# Patient Record
Sex: FEMALE | Race: WHITE | NOT HISPANIC OR LATINO | Employment: OTHER | URBAN - NONMETROPOLITAN AREA
[De-identification: names, ages, dates, MRNs, and addresses within clinical notes are randomized per-mention and may not be internally consistent; named-entity substitution may affect disease eponyms.]

---

## 2017-06-29 ENCOUNTER — OFFICE VISIT (OUTPATIENT)
Dept: ORTHOPEDIC SURGERY | Facility: CLINIC | Age: 62
End: 2017-06-29

## 2017-06-29 VITALS — HEIGHT: 62 IN | BODY MASS INDEX: 33.4 KG/M2 | WEIGHT: 181.5 LBS | RESPIRATION RATE: 16 BRPM

## 2017-06-29 DIAGNOSIS — S42.332A CLOSED DISPLACED OBLIQUE FRACTURE OF SHAFT OF LEFT HUMERUS, INITIAL ENCOUNTER: Primary | ICD-10-CM

## 2017-06-29 PROCEDURE — 99204 OFFICE O/P NEW MOD 45 MIN: CPT | Performed by: ORTHOPAEDIC SURGERY

## 2017-06-29 RX ORDER — ERGOCALCIFEROL 1.25 MG/1
50000 CAPSULE ORAL WEEKLY
Qty: 4 CAPSULE | Refills: 6 | Status: SHIPPED | OUTPATIENT
Start: 2017-06-29 | End: 2018-01-13 | Stop reason: SDUPTHER

## 2017-06-29 RX ORDER — FOLIC ACID 1 MG/1
1 TABLET ORAL DAILY
COMMUNITY

## 2017-06-29 RX ORDER — PHENOBARBITAL 15 MG/1
15 TABLET ORAL 2 TIMES DAILY
COMMUNITY
End: 2020-12-27

## 2017-06-29 RX ORDER — MELATONIN
1000 DAILY
COMMUNITY

## 2017-06-29 RX ORDER — PREGABALIN 100 MG/1
100 CAPSULE ORAL 2 TIMES DAILY
COMMUNITY
End: 2020-02-10 | Stop reason: SDUPTHER

## 2017-06-29 RX ORDER — DOCUSATE SODIUM 100 MG/1
100 CAPSULE, LIQUID FILLED ORAL 2 TIMES DAILY
COMMUNITY

## 2017-06-29 RX ORDER — ALENDRONATE SODIUM 10 MG/1
10 TABLET ORAL
COMMUNITY

## 2017-06-29 NOTE — PROGRESS NOTES
Subjective   Patient ID: Viviane Peñaloza is a 61 y.o. female  Pain of the Left Shoulder (Patient sustained injury approximately 5 months ago; slipped on rug while playing with nephew causing patient to fall. Patient is right hand dominant.  )             History of Present Illness  Patient fell approximate 5 months ago landing on her left shoulder sustaining a proximal humeral fracture was seen by Dr. 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  The family requested a second opinion and she's here today to be seen for proximity to her home in [place].  She complains of pain in the arm with use and laying on the side at night denies rest pain.  Pain is actually improved last several months.  Denies elbow or wrist pain.  No numbness or tingling.  Has intact neurologic function, denies neck pain.  Has history of old clavicle fracture many years ago on the left side which healed without complications.  Patient's medical history is positive for seizure disorder managed with medications has been taking calcium and vitamin D supplements for osteoporosis and denies other acute medical illness.      Review of Systems   Constitutional: Negative for diaphoresis, fever and unexpected weight change.   HENT: Negative for dental problem and sore throat.    Eyes: Negative for visual disturbance.   Respiratory: Negative for shortness of breath.    Cardiovascular: Negative for chest pain.   Gastrointestinal: Negative for abdominal pain, constipation, diarrhea, nausea and vomiting.   Genitourinary: Negative for difficulty urinating and frequency.   Musculoskeletal: Positive for arthralgias.   Neurological: Negative for headaches.   Hematological: Does not bruise/bleed easily.   All other systems reviewed and are negative.      History reviewed. No pertinent  "past medical history.     History reviewed. No pertinent surgical history.    History reviewed. No pertinent family history.    Social History     Social History   • Marital status: Single     Spouse name: N/A   • Number of children: N/A   • Years of education: N/A     Occupational History   • Not on file.     Social History Main Topics   • Smoking status: Not on file   • Smokeless tobacco: Not on file   • Alcohol use Not on file   • Drug use: Not on file   • Sexual activity: Not on file     Other Topics Concern   • Not on file     Social History Narrative   • No narrative on file       No Known Allergies    Objective   Resp 16  Ht 62\" (157.5 cm)  Wt 181 lb 8 oz (82.3 kg)  BMI 33.2 kg/m2   Physical Exam  Constitutional: He is oriented to person, place, and time. He appears well-developed and well-nourished.   HENT:   Head: Normocephalic and atraumatic.   Eyes: EOM are normal. Pupils are equal, round, and reactive to light.   Neck: Normal range of motion. Neck supple.   Cardiovascular: Normal rate.    Pulmonary/Chest: Effort normal and breath sounds normal.   Abdominal: Soft.   Neurological: He is alert and oriented to person, place, and time.   Skin: Skin is warm and dry.   Psychiatric: He has a normal mood and affect.   Nursing note and vitals reviewed.       Ortho Exam   Left shoulder with obvious motion at the fracture site with pain, minimal pain at the acromioclavicular joint, minimal atrophy of the supraspinatus area, intact radial median and ulnar nerve function to the left arm, forearm soft, elbow wrist and hand with no focal areas of tenderness swelling or ecchymosis, good circulation to the forearm and hand with negative Apolinar testing.    Assessment/Plan   Review of Radiographic Studies:    No new imaging done today.      Procedures        Use brace as instructed      Recommendations/Plan:   Brace: Patient referred for custom humeral fracture brace    Patient agreeable to call or return sooner for any " concerns.       Discussed with patient and her sister the diagnosis condition natural history and treatment alternatives.  At this point I would agree with decision to continue with nonsurgical treatment provided external support in the form of a orthotic, supplemental vitamin D levels, continue calcium supplements, recheck x-rays in several months.  If failure to heal occurs at that time patient should be referred to [facility] for definitive  treatment.  Patient and family are in agreement with the above plan.      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

## 2017-08-25 DIAGNOSIS — S42.332A CLOSED DISPLACED OBLIQUE FRACTURE OF SHAFT OF LEFT HUMERUS, INITIAL ENCOUNTER: Primary | ICD-10-CM

## 2017-08-30 ENCOUNTER — OFFICE VISIT (OUTPATIENT)
Dept: ORTHOPEDIC SURGERY | Facility: CLINIC | Age: 62
End: 2017-08-30

## 2017-08-30 VITALS — BODY MASS INDEX: 34.28 KG/M2 | RESPIRATION RATE: 16 BRPM | WEIGHT: 186.3 LBS | HEIGHT: 62 IN

## 2017-08-30 DIAGNOSIS — S42.332K CLOSED DISPLACED OBLIQUE FRACTURE OF SHAFT OF LEFT HUMERUS WITH NONUNION, SUBSEQUENT ENCOUNTER: Primary | ICD-10-CM

## 2017-08-30 PROCEDURE — 99214 OFFICE O/P EST MOD 30 MIN: CPT | Performed by: ORTHOPAEDIC SURGERY

## 2017-08-30 RX ORDER — LEVETIRACETAM 750 MG/1
TABLET ORAL
COMMUNITY
Start: 2017-08-26

## 2017-08-30 RX ORDER — PREDNISONE 10 MG/1
TABLET ORAL
COMMUNITY
Start: 2017-06-23 | End: 2020-12-27

## 2017-08-30 RX ORDER — PHENOBARBITAL 64.8 MG/1
TABLET ORAL
COMMUNITY
Start: 2017-08-28 | End: 2020-02-10 | Stop reason: SDUPTHER

## 2017-08-30 RX ORDER — ALENDRONATE SODIUM 70 MG/1
TABLET ORAL
COMMUNITY
Start: 2017-08-26

## 2017-08-30 RX ORDER — NYSTATIN 100000 U/G
OINTMENT TOPICAL
COMMUNITY
Start: 2017-06-22

## 2017-08-30 NOTE — PROGRESS NOTES
Subjective   Patient ID: Viviane Peñaloza is a 62 y.o. female  Follow-up of the Left Shoulder             History of Present Illness    Date of original injury occurred January 2017, treated elsewhere with sling, I saw her for first evaluation several months ago at  which time made recommendation for fracture bracing-- she returns now for routine follow-up.  She is right-hand-dominant.  Patient still complains of left arm pain with use still slightly improve wearing her coaptation brace but is bothering the axillary area of her contralateral right shoulder.  No new complaints of swelling in the arm associated wrist or hand pain.  She continues to take vitamin D and calcium supplements.    Review of Systems   Constitutional: Negative for diaphoresis, fever and unexpected weight change.   HENT: Negative for dental problem and sore throat.    Eyes: Negative for visual disturbance.   Respiratory: Negative for shortness of breath.    Cardiovascular: Negative for chest pain.   Gastrointestinal: Negative for abdominal pain, constipation, diarrhea, nausea and vomiting.   Genitourinary: Negative for difficulty urinating and frequency.   Musculoskeletal: Positive for arthralgias.   Neurological: Negative for headaches.   Hematological: Does not bruise/bleed easily.   All other systems reviewed and are negative.      Past Medical History:   Diagnosis Date   • Epilepsy    • Fracture, clavicle     left    • Shoulder fracture, left         Past Surgical History:   Procedure Laterality Date   • OOPHORECTOMY         Family History   Problem Relation Age of Onset   • Diabetes Mother    • Heart disease Mother    • Hypertension Mother    • Hypertension Father    • Diabetes Sister    • Hypertension Sister        Social History     Social History   • Marital status: Single     Spouse name: N/A   • Number of children: N/A   • Years of education: N/A     Occupational History   • Not on file.     Social History Main Topics   • Smoking status:  "Never Smoker   • Smokeless tobacco: Never Used   • Alcohol use No   • Drug use: No   • Sexual activity: Defer     Other Topics Concern   • Not on file     Social History Narrative       All the above social hx, family hx, surgical history, allergies, ros & HPI reviewed.    No Known Allergies    Objective   Resp 16  Ht 62\" (157.5 cm)  Wt 186 lb 4.8 oz (84.5 kg)  BMI 34.07 kg/m2   Physical Exam  Constitutional: He is oriented to person, place, and time. He appears well-developed and well-nourished.   HENT:   Head: Normocephalic and atraumatic.   Eyes: EOM are normal. Pupils are equal, round, and reactive to light.   Neck: Normal range of motion. Neck supple.   Cardiovascular: Normal rate.    Pulmonary/Chest: Effort normal and breath sounds normal.   Abdominal: Soft.   Neurological: He is alert and oriented to person, place, and time.   Skin: Skin is warm and dry.   Psychiatric: He has a normal mood and affect.   Nursing note and vitals reviewed.       Ortho Exam   Left shoulder with pain at the fracture site with gross motion evident with forward flexion and extension and abduction, elbow with no focal tenderness swelling, radial nerve function grossly intact to the hand, no tenderness swelling or ecchymosis in the wrist or hand.    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

## 2018-01-15 RX ORDER — ERGOCALCIFEROL 1.25 MG/1
CAPSULE ORAL
Qty: 4 CAPSULE | Refills: 2 | Status: SHIPPED | OUTPATIENT
Start: 2018-01-15

## 2018-02-08 ENCOUNTER — OUTSIDE FACILITY SERVICE (OUTPATIENT)
Dept: INTERNAL MEDICINE | Facility: CLINIC | Age: 63
End: 2018-02-08

## 2018-02-08 PROCEDURE — 99306 1ST NF CARE HIGH MDM 50: CPT | Performed by: FAMILY MEDICINE

## 2018-02-13 ENCOUNTER — OUTSIDE FACILITY SERVICE (OUTPATIENT)
Dept: INTERNAL MEDICINE | Facility: CLINIC | Age: 63
End: 2018-02-13

## 2018-02-13 PROCEDURE — 99309 SBSQ NF CARE MODERATE MDM 30: CPT | Performed by: FAMILY MEDICINE

## 2018-02-14 ENCOUNTER — OUTSIDE FACILITY SERVICE (OUTPATIENT)
Dept: FAMILY MEDICINE CLINIC | Facility: CLINIC | Age: 63
End: 2018-02-14

## 2018-02-14 PROCEDURE — 99309 SBSQ NF CARE MODERATE MDM 30: CPT | Performed by: NURSE PRACTITIONER

## 2018-02-23 ENCOUNTER — OUTSIDE FACILITY SERVICE (OUTPATIENT)
Dept: FAMILY MEDICINE CLINIC | Facility: CLINIC | Age: 63
End: 2018-02-23

## 2018-02-23 PROCEDURE — 99309 SBSQ NF CARE MODERATE MDM 30: CPT | Performed by: NURSE PRACTITIONER

## 2018-02-27 ENCOUNTER — OUTSIDE FACILITY SERVICE (OUTPATIENT)
Dept: INTERNAL MEDICINE | Facility: CLINIC | Age: 63
End: 2018-02-27

## 2018-02-27 PROCEDURE — 99309 SBSQ NF CARE MODERATE MDM 30: CPT | Performed by: FAMILY MEDICINE

## 2018-03-01 ENCOUNTER — DOCUMENTATION (OUTPATIENT)
Dept: INTERNAL MEDICINE | Facility: CLINIC | Age: 63
End: 2018-03-01

## 2018-03-01 NOTE — PROGRESS NOTES
[facility]      Patient Name: Viviane Peñaloza    : 1955     DOS: 2018    Nursing Facility: []   [place]  []   [facility]  []   [facility]  [x]   [facility]    Subjective     Chief Complaint:  CMM/LTC    History of Present Illness:   [x]  Routine visit for coordination of short term rehab/care issues and chronic medical management needs.  L medial tibia fracture, no new injuries.    Review of Systems:  []  A complete ROS was performed. All were (-).  [x]  A complete ROS was performed. All were (-) except:      + - Comments   Constitutional []    []      Eyes  []   []      ENT & Mouth []   []      Cardiovascular  []   []      Respiratory []   []      Gastrointestinal  []   []      Genitourinary []   []      Musculoskeletal [x]   []   RLE pain, mild edema at times   Skin/Breast  []   []      Neurological []   []      Endocrine []   []      Hematological/Lymph  []   []      Allergy/Immuno []   []        Past Medical History:   Past Medical History:   Diagnosis Date   • Epilepsy    • Fracture, clavicle     left    • Shoulder fracture, left     Reviewed at facility    Past Surgical History:  Past Surgical History:   Procedure Laterality Date   • OOPHORECTOMY      Reviewed at facility    Family History: family history includes Diabetes in her mother and sister; Heart disease in her mother; Hypertension in her father, mother, and sister. Reviewed at facility    Social History:  reports that she has never smoked. She has never used smokeless tobacco. She reports that she does not drink alcohol or use illicit drugs. Reviewed at facility    Allergies:  No Known Allergies Reviewed at facility, no new listings     [x]  History reviewed at skilled nursing facility.    Objective     Vital Signs: BP: __110/60___  HR: __82__  Res: __18__  O2: __96__  LPM: __RA__  Weight: __178___    Physical Exam:  General:   [x]  Alert   [x]  Oriented  []  Confused  []  Disoriented        []  Comatose     Head:   [x] NCAT   [x]  Nasal/Ears  [x]  Throat      Eyes:        [x]  PEERLA  [x]  Conjunctive NL     Psych:  [x]  Mood ___no acute changes___________  []  Depressed     Heart::   [x]  RRR  []  IRRR  []  Murmur ___________     Pedal Pulses:    [x]  Present  []  Absent      Lungs:   [x]   Clear  []  Rales, Location ___________       Abdomen:    [x]   Soft, BS x 4, NT/ND  []  Distended  []  Tender __________       Skin:  [x]  Intact  []  Pressure Ulcer, Location _______________     Extremities:  [x]  Normal  [x]  Edema, Location gravity dep to LLE  []  Pitting     Pain:  []  None  [x]  Pain noted w/pain management in place       Urinary:                [x]  Continent  []  Incontinent  []  Retention  []  F/C     []  UTI w/treatment in progress                Appetite:  []  Fair  [x]  Good  []  Poor  [x]  Weight Stable     []  Weightloss  []  Unavoidable Weight Loss     []  Supplements Provided  []  Tolerating Tube Feeding    Assessment / Plan     Assessment/Plan:     [x]  Medications Reviewed at Skilled Nursing Facility  [x]   PT/OT Reviewed       []  Order Changes  [x]   Labs Reviewed  []   Discharge Plans Reviewed    [x]  Plan of Care Reviewed  []  Discharge Summary Reviewed      [x]  Discussed Patient in detail with nursing/staff, addressed all needs today.  1) Epilepsy- No reports of sz like activity.   2) Fracture Left Medial Tibia- cont routine ortho after-care as per recommendation of orthopedic surgery; cont PT/OT; pain appears well controlled at present.  3) Impaired Mobility and ADLs- cont supportive care as needed for any ADLs.  Good [place]/wt stable.    Luigi Urena DO 02/08/2018     EMR Dragon/Transcription disclaimer:   Much of this encounter note is an electronic transcription/translation of spoken language to printed text. The electronic translation of spoken language may permit erroneous, or at times, nonsensical words or phrases to be inadvertently transcribed; Although I have reviewed the note for such errors, some  may still exist.

## 2018-03-07 ENCOUNTER — OUTSIDE FACILITY SERVICE (OUTPATIENT)
Dept: INTERNAL MEDICINE | Facility: CLINIC | Age: 63
End: 2018-03-07

## 2018-03-07 PROCEDURE — 99309 SBSQ NF CARE MODERATE MDM 30: CPT | Performed by: FAMILY MEDICINE

## 2018-03-23 ENCOUNTER — OUTSIDE FACILITY SERVICE (OUTPATIENT)
Dept: FAMILY MEDICINE CLINIC | Facility: CLINIC | Age: 63
End: 2018-03-23

## 2018-03-23 PROCEDURE — 99308 SBSQ NF CARE LOW MDM 20: CPT | Performed by: NURSE PRACTITIONER

## 2018-03-28 ENCOUNTER — OUTSIDE FACILITY SERVICE (OUTPATIENT)
Dept: INTERNAL MEDICINE | Facility: CLINIC | Age: 63
End: 2018-03-28

## 2018-03-28 PROCEDURE — 99309 SBSQ NF CARE MODERATE MDM 30: CPT | Performed by: FAMILY MEDICINE

## 2018-04-11 ENCOUNTER — OUTSIDE FACILITY SERVICE (OUTPATIENT)
Dept: INTERNAL MEDICINE | Facility: CLINIC | Age: 63
End: 2018-04-11

## 2018-04-11 PROCEDURE — 99309 SBSQ NF CARE MODERATE MDM 30: CPT | Performed by: FAMILY MEDICINE

## 2018-04-24 ENCOUNTER — OUTSIDE FACILITY SERVICE (OUTPATIENT)
Dept: INTERNAL MEDICINE | Facility: CLINIC | Age: 63
End: 2018-04-24

## 2018-04-24 PROCEDURE — 99309 SBSQ NF CARE MODERATE MDM 30: CPT | Performed by: FAMILY MEDICINE

## 2018-05-04 ENCOUNTER — OUTSIDE FACILITY SERVICE (OUTPATIENT)
Dept: FAMILY MEDICINE CLINIC | Facility: CLINIC | Age: 63
End: 2018-05-04

## 2018-05-04 PROCEDURE — 99308 SBSQ NF CARE LOW MDM 20: CPT | Performed by: NURSE PRACTITIONER

## 2018-06-29 ENCOUNTER — OUTSIDE FACILITY SERVICE (OUTPATIENT)
Dept: FAMILY MEDICINE CLINIC | Facility: CLINIC | Age: 63
End: 2018-06-29

## 2018-06-29 PROCEDURE — 99309 SBSQ NF CARE MODERATE MDM 30: CPT | Performed by: NURSE PRACTITIONER

## 2018-07-09 ENCOUNTER — NURSING HOME (OUTPATIENT)
Dept: FAMILY MEDICINE CLINIC | Facility: CLINIC | Age: 63
End: 2018-07-09

## 2018-07-09 VITALS
WEIGHT: 198 LBS | RESPIRATION RATE: 18 BRPM | SYSTOLIC BLOOD PRESSURE: 130 MMHG | BODY MASS INDEX: 36.21 KG/M2 | DIASTOLIC BLOOD PRESSURE: 70 MMHG | HEART RATE: 72 BPM | OXYGEN SATURATION: 97 % | TEMPERATURE: 98 F

## 2018-07-09 DIAGNOSIS — G40.409 OTHER GENERALIZED EPILEPSY, NOT INTRACTABLE, WITHOUT STATUS EPILEPTICUS (HCC): Chronic | ICD-10-CM

## 2018-07-09 DIAGNOSIS — Z78.9 IMPAIRED MOBILITY AND ADLS: Chronic | ICD-10-CM

## 2018-07-09 DIAGNOSIS — Z74.09 IMPAIRED MOBILITY AND ADLS: Chronic | ICD-10-CM

## 2018-07-09 DIAGNOSIS — S82.132D: Primary | ICD-10-CM

## 2018-07-09 PROBLEM — G40.909 EPILEPSY (HCC): Chronic | Status: ACTIVE | Noted: 2018-07-09

## 2018-07-09 PROCEDURE — 99308 SBSQ NF CARE LOW MDM 20: CPT | Performed by: FAMILY MEDICINE

## 2018-07-09 NOTE — PROGRESS NOTES
[facility]  Skilled Nursing Facility    Patient Name: Viviane Peñaloza    : 1955     DOS: 2018    Nursing Facility: []   [place]  []   [facility]  []   [facility]  [x]   [facility]    Subjective     Chief Complaint:  CMM/LTC    History of Present Illness:   [x]  Follow Up visit for coordination of long term care issues and chronic medical management needs.    Left medial tibial fx; no sz like activity; imp mob and ADLs      Review of Systems:  [x]  A complete ROS was performed. All were (-).  []  A complete ROS was performed. All were (-) except:     Review of Systems    1. Constitutional:  2. HENT:  3. Eyes:  4. Respiratory:  5. Cardiovascular:  6. Gastrointestinal:  7. Endocrine:  8. Genitourinary:  9. Musculoskeletal:  10. Skin:  11. Neurological:  12. Hematological:  13. Psychiatric/Behavioral:    Past Medical History:   Past Medical History:   Diagnosis Date   • Epilepsy (CMS/HCC)    • Fracture, clavicle     left    • Shoulder fracture, left     Reviewed at facility    Past Surgical History:  Past Surgical History:   Procedure Laterality Date   • OOPHORECTOMY      Reviewed at facility    Family History: family history includes Diabetes in her mother and sister; Heart disease in her mother; Hypertension in her father, mother, and sister. Reviewed at facility    Social History:  reports that she has never smoked. She has never used smokeless tobacco. She reports that she does not drink alcohol or use drugs. Reviewed at facility    Allergies:  No Known Allergies Reviewed at facility, no new listings     [x]  History reviewed at skilled nursing facility.    Objective     Vital Signs: Temp:  [98 °F (36.7 °C)] 98 °F (36.7 °C)  Heart Rate:  [72] 72  Resp:  [18] 18  BP: (130)/(70) 130/70                                            LPM: ____  Weight: _____    Physical Exam:  General:   [x]  Alert   [x]  Oriented x 3  [x]  No acute distress    []  Confused  []  Disoriented   []  Comatose      HEENT:    [x] Atraumatic  [x]  PEERLA  [x]  Oral mucosa pink and moist                Neck:      [x]  Nares patent without epistaxis  [x]  External auditory canals are patent     [x]  Tympanic membranes intact      [x]  Supple [x]  No JVD [x]  Trachea midline [x]  No thyromegaly      Psych:  [x]  Mood _________  []  No acute changes []  Depressed     Heart::   [x]  RRR  []  IRRR  []  Murmur ___________     Pedal Pulses:    [x]  Present  []  Absent      Lungs:   [x]   CTA  [x]  Unlabored breathing effort []  Rales, Location ___________       Abdomen:    [x]   Soft, BS x 4, NT/ND  []  Distended  []  Tender __________       Skin:  [x]  Intact, warm and dry  []  Pressure Ulcer, Location _______________     Extremities:               Neuro:     [x]  No clubbing [x]  No cyanosis  [x]  No edema    [x]  Good ROM actively and passively [x] Symmetric muscle strength and                                                                 development     []  Edema, Location _______________  []  Pitting    [x]  Normal speech [x]  Normal mental status [x] Cranial nerves II through                                                                                XII intact   [x]  No anosmia [x]  DTR 2+ [x]  Proprioception intact    [x]  No focal motor/sensory deficits       Pain:  []  None  [x]  Pain noted w/pain management in place       Urinary:                []  Continent  []  Incontinent  []  Retention  []  F/C     []  UTI w/treatment in progress              Appetite:  []  Fair  [x]  Good  []  Poor  [x]  Weight Stable     []  Weightloss  []  Unavoidable Weight Loss     []  Supplements Provided  []  Tolerating Tube Feeding              Assessment / Plan     Assessment/Problem List:    Patient Active Problem List   Diagnosis   • Epilepsy (CMS/HCC)   • Impaired mobility and ADLs   • Closed displaced fracture of medial condyle of left tibia with routine healing       Plan:  Cont SC as needed for mobilization and wt bearing; no reports of sz  like activity; cont to be followed outpt by neurology; good appetite; pain well controlled.      [x] Medication Review: I have reviewed the patients active and prn medications at Skilled Nursing Facility      []   PT/OT Reviewed   []  Order Changes     [x] I have personally reviewed and interpreted available lab data, radiology studies and ECG obtained at time of visit.       []   Discharge Plans Reviewed      [x]  Plan of Care Reviewed  []  Discharge Summary Reviewed      [x]  Discussed Patient in detail with nursing/staff, addressed all needs today.    Discussed plan of care in detail with pt today; pt verb understanding and agrees; counseled for approx 15 min of total 25 min total exam time.    Luigi Urena DO 07/09/18 7:50 PM    EMR Dragon/Transcription disclaimer:   Much of this encounter note is an electronic transcription/translation of spoken language to printed text. The electronic translation of spoken language may permit erroneous, or at times, nonsensical words or phrases to be inadvertently transcribed; Although I have reviewed the note for such errors, some may still exist.

## 2018-07-17 ENCOUNTER — NURSING HOME (OUTPATIENT)
Dept: FAMILY MEDICINE CLINIC | Facility: CLINIC | Age: 63
End: 2018-07-17

## 2018-07-17 DIAGNOSIS — Z74.09 IMPAIRED MOBILITY AND ADLS: Chronic | ICD-10-CM

## 2018-07-17 DIAGNOSIS — Z78.9 IMPAIRED MOBILITY AND ADLS: Chronic | ICD-10-CM

## 2018-07-17 DIAGNOSIS — G40.309 NONINTRACTABLE GENERALIZED IDIOPATHIC EPILEPSY WITHOUT STATUS EPILEPTICUS (HCC): Primary | Chronic | ICD-10-CM

## 2018-07-17 DIAGNOSIS — S82.132D: ICD-10-CM

## 2018-07-17 PROCEDURE — 99309 SBSQ NF CARE MODERATE MDM 30: CPT | Performed by: FAMILY MEDICINE

## 2018-07-19 PROBLEM — G40.309 NONINTRACTABLE GENERALIZED IDIOPATHIC EPILEPSY WITHOUT STATUS EPILEPTICUS: Chronic | Status: ACTIVE | Noted: 2018-07-19

## 2018-07-19 PROBLEM — G40.909 EPILEPSY: Chronic | Status: RESOLVED | Noted: 2018-07-09 | Resolved: 2018-07-19

## 2018-07-19 NOTE — PROGRESS NOTES
Nursing Home Progress Note      Patient Name: Viviane Peñaloza   YOB: 1955    Date of Service: 07/17/2018      Facility: [facility] []   [place] []   [facility] [x]   [facility] []   Other []       CHIEF COMPLAINT:  CMM/LTC     HISTORY OF PRESENT ILLNESS:  [x]  Follow Up visit for coordination of chronic medical management needs and issues.    Epilepsy/impaired mobility and ADLs/post displaced fracture of the medial condyle of the left tibia with routine healing    PAST MEDICAL & SURGICAL HISTORY:  Past Medical History:   Diagnosis Date   • Epilepsy (CMS/HCC)    • Fracture, clavicle     left    • Shoulder fracture, left       Past Surgical History:   Procedure Laterality Date   • OOPHORECTOMY          MEDICATIONS:  Medication administration record for Viviane Peñaloza reviewed and reconciled today.    ALLERGIES:  No Known Allergies     REVIEW OF SYSTEMS:  • Appetite: Fair []   Good [x]   Poor []   Weight Loss []  [x]  Weight Stable   Unavoidable Weight Loss []  Tolerating Tube Feeding []    Supplements Provided []   • Constitutional: Negative for fever, chills, diaphoresis or fatigue and weight change.   • HENT: No dysphagia; no changes to vision/hearing/smell/taste; no epistaxis  • Eyes: Negative for redness and visual disturbance.   • Respiratory: Negative for shortness of breath, chest pain, cough or chest tightness.   • Cardiovascular: Negative for chest pain and palpitations.   • Gastrointestinal: Negative for abdominal distention, abdominal pain and blood in stool.   • Endocrine: Negative for cold intolerance and heat intolerance.   • Genitourinary: Negative for difficulty urinating, dysuria and frequency.Negative for hematuria   • Musculoskeletal: Chronic myalgias and arthralgias  • Integumentary: No open wounds, rash or concerning skin lesions  • Neurological: Negative for syncope, weakness and headaches.  No seizure-like activity reported.  • Hematological: Negative for adenopathy. Does not bruise/bleed  easily.   • Immunological: Negative for reported allergies or immunological disorders  • Psychological: No depression, anxiety or suicidal ideation    PHYSICAL EXAMINATION:  VITAL SIGNS: /67, HR 68 bpm, RR 16, O2 sat 98%, weight 198    General Appearance:  [x]  Alert   [x]  Oriented x 3  [x]  No acute distress    []  Confused  []  Disoriented   []  Comatose   Head:  Atraumatic and normocephalic, without obvious abnormality.   Eyes:          PERRLA, conjunctivae and sclerae normal, no Icterus. No pallor. Extra-occular movements are within normal limits.   Ears:  Ears appear intact with no abnormalities noted.   Throat: No oral lesions, no thrush, oral mucosa moist.   Neck: Supple, trachea midline, no thyromegaly, no carotid bruit.   Back:   No kyphoscoliosis. No tenderness to palpation.   Lungs:   Chest shape is normal.  Audible air exchange noted all lung fields.  No wheezing.    Heart:  Normal S1 and S2, no murmur, no gallop, no rub. No JVD.   Abdomen:   Normal bowel sounds, no masses, no organomegaly. Soft, non-tender, non-distended, no guarding    Extremities: Moves all extremities, edema, cyanosis or clubbing.  Generalized weakness noted to the left lower extremity.      Pulses: Pulses palpable and equal bilaterally.   Skin: No bleeding or rash.  Generalized dry skin noted.  Age-related atrophy of skin.   Neurologic: [x] Normal speech []  Normal mental status    [x] Cranial nerves II through XII intact   [x]  No anosmia [x]  DTR 2+ [x]  Proprioception intact  [x]  No focal motor/sensory deficits     Psych/Mood:        [x]  No acute changes []  Depressed  Urinary:        [x]  Continent  []  Incontinent  []  Retention  []  F/C     []  UTI w/treatment in progress  RECORD REVIEW:   • Consult notes []   • Admission and subsequent notes []   •  [x] I have personally reviewed and interpreted available lab data, radiology studies and ECG obtained at time of visit.  [x] Medication Review: I have reviewed the  patients active and prn medications at Skilled Nursing Facility     ASSESSMENT   Diagnoses and all orders for this visit:    Nonintractable generalized idiopathic epilepsy without status epilepticus (CMS/HCC)    Closed displaced fracture of medial condyle of left tibia with routine healing    Impaired mobility and ADLs    •     PLAN  Continue supportive care as needed for mobilization and any assistance with ADLs.  No reported seizure-like activity.  Continue current medication regimen.  Routine surveillance labs as needed.  Continue therapy as able.    [x]  Discussed Patient in detail with nursing/staff, addressed all needs today.     [x]  Plan of Care Reviewed   []   PT/OT Reviewed   []  Order Changes  []   Discharge Plans Reviewed         Total face to face time spent with patient 25 minutes, 15 min in counseling.    Luigi Urena DO.  7/19/2018

## 2018-07-23 ENCOUNTER — NURSING HOME (OUTPATIENT)
Dept: FAMILY MEDICINE CLINIC | Facility: CLINIC | Age: 63
End: 2018-07-23

## 2018-07-23 DIAGNOSIS — Z74.09 IMPAIRED MOBILITY AND ADLS: Primary | Chronic | ICD-10-CM

## 2018-07-23 DIAGNOSIS — Z78.9 IMPAIRED MOBILITY AND ADLS: Primary | Chronic | ICD-10-CM

## 2018-07-23 DIAGNOSIS — S82.132D: ICD-10-CM

## 2018-07-23 DIAGNOSIS — G40.309 NONINTRACTABLE GENERALIZED IDIOPATHIC EPILEPSY WITHOUT STATUS EPILEPTICUS (HCC): Chronic | ICD-10-CM

## 2018-07-23 PROCEDURE — 99309 SBSQ NF CARE MODERATE MDM 30: CPT | Performed by: FAMILY MEDICINE

## 2018-07-31 ENCOUNTER — NURSING HOME (OUTPATIENT)
Dept: FAMILY MEDICINE CLINIC | Facility: CLINIC | Age: 63
End: 2018-07-31

## 2018-07-31 DIAGNOSIS — S82.132D: ICD-10-CM

## 2018-07-31 DIAGNOSIS — Z74.09 IMPAIRED MOBILITY AND ADLS: Chronic | ICD-10-CM

## 2018-07-31 DIAGNOSIS — Z78.9 IMPAIRED MOBILITY AND ADLS: Chronic | ICD-10-CM

## 2018-07-31 DIAGNOSIS — G40.309 NONINTRACTABLE GENERALIZED IDIOPATHIC EPILEPSY WITHOUT STATUS EPILEPTICUS (HCC): Primary | Chronic | ICD-10-CM

## 2018-07-31 PROCEDURE — 99309 SBSQ NF CARE MODERATE MDM 30: CPT | Performed by: FAMILY MEDICINE

## 2018-08-08 ENCOUNTER — NURSING HOME (OUTPATIENT)
Dept: FAMILY MEDICINE CLINIC | Facility: CLINIC | Age: 63
End: 2018-08-08

## 2018-08-08 DIAGNOSIS — G40.309 NONINTRACTABLE GENERALIZED IDIOPATHIC EPILEPSY WITHOUT STATUS EPILEPTICUS (HCC): Primary | Chronic | ICD-10-CM

## 2018-08-08 DIAGNOSIS — Z74.09 IMPAIRED MOBILITY AND ADLS: Chronic | ICD-10-CM

## 2018-08-08 DIAGNOSIS — S82.132D: ICD-10-CM

## 2018-08-08 DIAGNOSIS — Z78.9 IMPAIRED MOBILITY AND ADLS: Chronic | ICD-10-CM

## 2018-08-08 PROCEDURE — 99309 SBSQ NF CARE MODERATE MDM 30: CPT | Performed by: FAMILY MEDICINE

## 2018-08-14 NOTE — PROGRESS NOTES
Nursing Home Progress Note      Patient Name: Viviane Peñaloza   YOB: 1955    Date of Service: 07/23/2018      Facility: [place] []   [place] []   [facility] [x]   [facility] []   Other []       CHIEF COMPLAINT:  CMM/LTC     HISTORY OF PRESENT ILLNESS:  [x]  Follow Up visit for coordination of chronic medical management needs and issues.    Epilepsy/impaired mobility and ADLs/post displaced fracture of the medial condyle of the left tibia with routine healing    PAST MEDICAL & SURGICAL HISTORY:  Past Medical History:   Diagnosis Date   • Epilepsy (CMS/HCC)    • Fracture, clavicle     left    • Shoulder fracture, left       Past Surgical History:   Procedure Laterality Date   • OOPHORECTOMY          MEDICATIONS:  Medication administration record for Viviane Peñaloza reviewed and reconciled today.    ALLERGIES:  No Known Allergies     REVIEW OF SYSTEMS:  • Appetite: Fair []   Good [x]   Poor []   Weight Loss []  [x]  Weight Stable   Unavoidable Weight Loss []  Tolerating Tube Feeding []    Supplements Provided []   • Constitutional: Negative for fever, chills, diaphoresis or fatigue and weight change.   • HENT: No dysphagia; no changes to vision/hearing/smell/taste; no epistaxis  • Eyes: Negative for redness and visual disturbance.   • Respiratory: Negative for shortness of breath, chest pain, cough or chest tightness.   • Cardiovascular: Negative for chest pain and palpitations.   • Gastrointestinal: Negative for abdominal distention, abdominal pain and blood in stool.   • Endocrine: Negative for cold intolerance and heat intolerance.   • Genitourinary: Negative for difficulty urinating, dysuria and frequency.Negative for hematuria   • Musculoskeletal: Chronic myalgias and arthralgias  • Integumentary: No open wounds, rash or concerning skin lesions  • Neurological: Negative for syncope, weakness and headaches.  No seizure-like activity reported.  • Hematological: Negative for adenopathy. Does not bruise/bleed  easily.   • Immunological: Negative for reported allergies or immunological disorders  • Psychological: No depression, anxiety or suicidal ideation    PHYSICAL EXAMINATION:  VITAL SIGNS: /64, HR 59 BPM, O2 sat 98% on room air, RR 16, weight 198    General Appearance:  [x]  Alert   [x]  Oriented x 3  [x]  No acute distress    []  Confused  []  Disoriented   []  Comatose   Head:  Atraumatic and normocephalic, without obvious abnormality.   Eyes:          PERRLA, conjunctivae and sclerae normal, no Icterus. No pallor. Extra-occular movements are within normal limits.   Ears:  Ears appear intact with no abnormalities noted.   Throat: No oral lesions, no thrush, oral mucosa moist.   Neck: Supple, trachea midline, no thyromegaly, no carotid bruit.   Back:   No kyphoscoliosis. No tenderness to palpation.   Lungs:   Chest shape is normal.  Audible air exchange noted all lung fields.  No wheezing.    Heart:  Normal S1 and S2, no murmur, no gallop, no rub. No JVD.   Abdomen:   Normal bowel sounds, no masses, no organomegaly. Soft, non-tender, non-distended, no guarding    Extremities: Moves all extremities, edema, cyanosis or clubbing.  Generalized weakness noted to the left lower extremity.      Pulses: Pulses palpable and equal bilaterally.   Skin: No bleeding or rash.  Generalized dry skin noted.  Age-related atrophy of skin.   Neurologic: [x] Normal speech []  Normal mental status    [x] Cranial nerves II through XII intact   [x]  No anosmia [x]  DTR 2+ [x]  Proprioception intact  [x]  No focal motor/sensory deficits     Psych/Mood:        [x]  No acute changes []  Depressed  Urinary:        [x]  Continent  []  Incontinent  []  Retention  []  F/C     []  UTI w/treatment in progress  RECORD REVIEW:   • Consult notes []   • Admission and subsequent notes []   •  [x] I have personally reviewed and interpreted available lab data, radiology studies and ECG obtained at time of visit.  [x] Medication Review: I have  reviewed the patients active and prn medications at [facility]     ASSESSMENT   Diagnoses and all orders for this visit:    Impaired mobility and ADLs    Nonintractable generalized idiopathic epilepsy without status epilepticus (CMS/HCC)    Closed displaced fracture of medial condyle of left tibia with routine healing        PLAN  Continue supportive care as needed for mobilization and any assistance with ADLs.  No reported seizure-like activity/episodes.  Continue current medication regimen, adjustments to be made as needed.  Routine surveillance labs to be continued.  Continue therapy as able to tolerate.  Appetite is good, hemodynamically stable on review of vital signs.    [x]  Discussed Patient in detail with nursing/staff, addressed all needs today.     [x]  Plan of Care Reviewed   []   PT/OT Reviewed   []  Order Changes  []   Discharge Plans Reviewed         Total face to face time spent with patient 25 minutes, 15 min in counseling.    Luigi Urena DO.  8/14/2018

## 2018-08-15 ENCOUNTER — NURSING HOME (OUTPATIENT)
Dept: FAMILY MEDICINE CLINIC | Facility: CLINIC | Age: 63
End: 2018-08-15

## 2018-08-15 DIAGNOSIS — G40.309 NONINTRACTABLE GENERALIZED IDIOPATHIC EPILEPSY WITHOUT STATUS EPILEPTICUS (HCC): Chronic | ICD-10-CM

## 2018-08-15 DIAGNOSIS — S82.132D: Primary | ICD-10-CM

## 2018-08-15 DIAGNOSIS — E55.9 VITAMIN D DEFICIENCY: ICD-10-CM

## 2018-08-15 DIAGNOSIS — Z74.09 IMPAIRED MOBILITY AND ADLS: Chronic | ICD-10-CM

## 2018-08-15 DIAGNOSIS — Z78.9 IMPAIRED MOBILITY AND ADLS: Chronic | ICD-10-CM

## 2018-08-15 PROCEDURE — 99309 SBSQ NF CARE MODERATE MDM 30: CPT | Performed by: FAMILY MEDICINE

## 2018-08-22 ENCOUNTER — NURSING HOME (OUTPATIENT)
Dept: FAMILY MEDICINE CLINIC | Facility: CLINIC | Age: 63
End: 2018-08-22

## 2018-08-22 DIAGNOSIS — Z74.09 IMPAIRED MOBILITY AND ADLS: Chronic | ICD-10-CM

## 2018-08-22 DIAGNOSIS — E55.9 VITAMIN D DEFICIENCY: ICD-10-CM

## 2018-08-22 DIAGNOSIS — Z78.9 IMPAIRED MOBILITY AND ADLS: Chronic | ICD-10-CM

## 2018-08-22 DIAGNOSIS — S82.132D: ICD-10-CM

## 2018-08-22 DIAGNOSIS — G40.309 NONINTRACTABLE GENERALIZED IDIOPATHIC EPILEPSY WITHOUT STATUS EPILEPTICUS (HCC): Primary | Chronic | ICD-10-CM

## 2018-08-22 PROCEDURE — 99309 SBSQ NF CARE MODERATE MDM 30: CPT | Performed by: FAMILY MEDICINE

## 2018-08-29 ENCOUNTER — NURSING HOME (OUTPATIENT)
Dept: FAMILY MEDICINE CLINIC | Facility: CLINIC | Age: 63
End: 2018-08-29

## 2018-08-29 DIAGNOSIS — G40.309 NONINTRACTABLE GENERALIZED IDIOPATHIC EPILEPSY WITHOUT STATUS EPILEPTICUS (HCC): Primary | Chronic | ICD-10-CM

## 2018-08-29 DIAGNOSIS — S82.132D: ICD-10-CM

## 2018-08-29 DIAGNOSIS — E55.9 VITAMIN D DEFICIENCY: ICD-10-CM

## 2018-08-29 DIAGNOSIS — Z78.9 IMPAIRED MOBILITY AND ADLS: Chronic | ICD-10-CM

## 2018-08-29 DIAGNOSIS — Z74.09 IMPAIRED MOBILITY AND ADLS: Chronic | ICD-10-CM

## 2018-08-29 PROCEDURE — 99309 SBSQ NF CARE MODERATE MDM 30: CPT | Performed by: FAMILY MEDICINE

## 2018-08-31 ENCOUNTER — OUTSIDE FACILITY SERVICE (OUTPATIENT)
Dept: FAMILY MEDICINE CLINIC | Facility: CLINIC | Age: 63
End: 2018-08-31

## 2018-08-31 PROCEDURE — 99308 SBSQ NF CARE LOW MDM 20: CPT | Performed by: NURSE PRACTITIONER

## 2018-09-05 ENCOUNTER — NURSING HOME (OUTPATIENT)
Dept: FAMILY MEDICINE CLINIC | Facility: CLINIC | Age: 63
End: 2018-09-05

## 2018-09-05 DIAGNOSIS — E55.9 VITAMIN D DEFICIENCY: ICD-10-CM

## 2018-09-05 DIAGNOSIS — S82.132D: ICD-10-CM

## 2018-09-05 DIAGNOSIS — Z74.09 IMPAIRED MOBILITY AND ADLS: Primary | Chronic | ICD-10-CM

## 2018-09-05 DIAGNOSIS — G40.309 NONINTRACTABLE GENERALIZED IDIOPATHIC EPILEPSY WITHOUT STATUS EPILEPTICUS (HCC): Chronic | ICD-10-CM

## 2018-09-05 DIAGNOSIS — Z78.9 IMPAIRED MOBILITY AND ADLS: Primary | Chronic | ICD-10-CM

## 2018-09-05 PROCEDURE — 99309 SBSQ NF CARE MODERATE MDM 30: CPT | Performed by: FAMILY MEDICINE

## 2018-09-05 NOTE — PROGRESS NOTES
Nursing Home Progress Note      Patient Name: Viviane Peñaloza   YOB: 1955    Date of Service: 07/31/2018      Facility: [place] []   [place] []   [facility] [x]   [facility] []   Other []       CHIEF COMPLAINT:  CMM/LTC     HISTORY OF PRESENT ILLNESS:  [x]  Follow Up visit for coordination of chronic medical management needs and issues.    Epilepsy/impaired mobility and ADLs/post displaced fracture of the medial condyle of the left tibia with routine healing    PAST MEDICAL & SURGICAL HISTORY:  Past Medical History:   Diagnosis Date   • Epilepsy (CMS/HCC)    • Fracture, clavicle     left    • Shoulder fracture, left       Past Surgical History:   Procedure Laterality Date   • OOPHORECTOMY          MEDICATIONS:  Medication administration record for Viviane Peñaloza reviewed and reconciled today.    ALLERGIES:  No Known Allergies     REVIEW OF SYSTEMS:  • Appetite: Fair []   Good [x]   Poor []   Weight Loss []  [x]  Weight Stable   Unavoidable Weight Loss []  Tolerating Tube Feeding []    Supplements Provided []   • Constitutional: Negative for fever, chills, diaphoresis or fatigue and weight change.   • HENT: No dysphagia; no changes to vision/hearing/smell/taste; no epistaxis  • Eyes: Negative for redness and visual disturbance.   • Respiratory: Negative for shortness of breath, chest pain, cough or chest tightness.   • Cardiovascular: Negative for chest pain and palpitations.   • Gastrointestinal: Negative for abdominal distention, abdominal pain and blood in stool.   • Endocrine: Negative for cold intolerance and heat intolerance.   • Genitourinary: Negative for difficulty urinating, dysuria and frequency.Negative for hematuria   • Musculoskeletal: Chronic myalgias and arthralgias  • Integumentary: No open wounds, rash or concerning skin lesions  • Neurological: Negative for syncope, weakness and headaches.  No seizure-like activity reported.  • Hematological: Negative for adenopathy. Does not bruise/bleed  easily.   • Immunological: Negative for reported allergies or immunological disorders  • Psychological: No depression, anxiety or suicidal ideation    PHYSICAL EXAMINATION:  VITAL SIGNS: /70, HR 64 BPM, O2 sat 98% on room air, RR 16, weight 198    General Appearance:  [x]  Alert   [x]  Oriented x 3  [x]  No acute distress    []  Confused  []  Disoriented   []  Comatose   Head:  Atraumatic and normocephalic, without obvious abnormality.   Eyes:          PERRLA, conjunctivae and sclerae normal, no Icterus. No pallor. Extra-occular movements are within normal limits.   Ears:  Ears appear intact with no abnormalities noted.   Throat: No oral lesions, no thrush, oral mucosa moist.   Neck: Supple, trachea midline, no thyromegaly, no carotid bruit.   Back:   No kyphoscoliosis. No tenderness to palpation.   Lungs:   Chest shape is normal.  Audible air exchange noted all lung fields.  No wheezing.    Heart:  Normal S1 and S2, no murmur, no gallop, no rub. No JVD.   Abdomen:   Normal bowel sounds, no masses, no organomegaly. Soft, non-tender, non-distended, no guarding    Extremities: Moves all extremities, edema, cyanosis or clubbing.  Generalized weakness noted to the left lower extremity.      Pulses: Pulses palpable and equal bilaterally.   Skin: No bleeding or rash.  Generalized dry skin noted.  Age-related atrophy of skin.   Neurologic: [x] Normal speech []  Normal mental status    [x] Cranial nerves II through XII intact   [x]  No anosmia [x]  DTR 2+ [x]  Proprioception intact  [x]  No focal motor/sensory deficits     Psych/Mood:        [x]  No acute changes []  Depressed  Urinary:        [x]  Continent  []  Incontinent  []  Retention  []  F/C     []  UTI w/treatment in progress  RECORD REVIEW:   • Consult notes []   • Admission and subsequent notes []   •  [x] I have personally reviewed and interpreted available lab data, radiology studies and ECG obtained at time of visit.  [x] Medication Review: I have  reviewed the patients active and prn medications at [facility]     ASSESSMENT   Diagnoses and all orders for this visit:    Nonintractable generalized idiopathic epilepsy without status epilepticus (CMS/HCC)    Impaired mobility and ADLs    Closed displaced fracture of medial condyle of left tibia with routine healing        PLAN  Continue supportive care as needed for mobilization and any assistance with ADLs.  No reported seizure-like activity/episodes.  Continue current medication regimen, adjustments to be made as needed.  Routine surveillance labs to be continued.  Continue therapy as able to tolerate.  Appetite is good, hemodynamically stable on review of vital signs.    [x]  Discussed Patient in detail with nursing/staff, addressed all needs today.     [x]  Plan of Care Reviewed   []   PT/OT Reviewed   []  Order Changes  []   Discharge Plans Reviewed         Total face to face time spent with patient 25 minutes, 15 min in counseling.    Luigi Urena DO.  7/31/2018

## 2018-09-12 ENCOUNTER — NURSING HOME (OUTPATIENT)
Dept: FAMILY MEDICINE CLINIC | Facility: CLINIC | Age: 63
End: 2018-09-12

## 2018-09-12 DIAGNOSIS — Z78.9 IMPAIRED MOBILITY AND ADLS: Primary | Chronic | ICD-10-CM

## 2018-09-12 DIAGNOSIS — G40.309 NONINTRACTABLE GENERALIZED IDIOPATHIC EPILEPSY WITHOUT STATUS EPILEPTICUS (HCC): Chronic | ICD-10-CM

## 2018-09-12 DIAGNOSIS — Z74.09 IMPAIRED MOBILITY AND ADLS: Primary | Chronic | ICD-10-CM

## 2018-09-12 PROCEDURE — 99309 SBSQ NF CARE MODERATE MDM 30: CPT | Performed by: FAMILY MEDICINE

## 2018-09-19 ENCOUNTER — NURSING HOME (OUTPATIENT)
Dept: FAMILY MEDICINE CLINIC | Facility: CLINIC | Age: 63
End: 2018-09-19

## 2018-09-19 DIAGNOSIS — G40.309 NONINTRACTABLE GENERALIZED IDIOPATHIC EPILEPSY WITHOUT STATUS EPILEPTICUS (HCC): Chronic | ICD-10-CM

## 2018-09-19 DIAGNOSIS — Z74.09 IMPAIRED MOBILITY AND ADLS: Primary | Chronic | ICD-10-CM

## 2018-09-19 DIAGNOSIS — Z78.9 IMPAIRED MOBILITY AND ADLS: Primary | Chronic | ICD-10-CM

## 2018-09-19 DIAGNOSIS — M81.0 OSTEOPOROSIS WITHOUT CURRENT PATHOLOGICAL FRACTURE, UNSPECIFIED OSTEOPOROSIS TYPE: ICD-10-CM

## 2018-09-19 PROCEDURE — 99309 SBSQ NF CARE MODERATE MDM 30: CPT | Performed by: FAMILY MEDICINE

## 2018-09-26 ENCOUNTER — NURSING HOME (OUTPATIENT)
Dept: FAMILY MEDICINE CLINIC | Facility: CLINIC | Age: 63
End: 2018-09-26

## 2018-09-26 DIAGNOSIS — E55.9 VITAMIN D DEFICIENCY: Primary | ICD-10-CM

## 2018-09-26 DIAGNOSIS — Z78.9 IMPAIRED MOBILITY AND ADLS: Chronic | ICD-10-CM

## 2018-09-26 DIAGNOSIS — M81.0 OSTEOPOROSIS WITHOUT CURRENT PATHOLOGICAL FRACTURE, UNSPECIFIED OSTEOPOROSIS TYPE: ICD-10-CM

## 2018-09-26 DIAGNOSIS — Z74.09 IMPAIRED MOBILITY AND ADLS: Chronic | ICD-10-CM

## 2018-09-26 DIAGNOSIS — G40.309 NONINTRACTABLE GENERALIZED IDIOPATHIC EPILEPSY WITHOUT STATUS EPILEPTICUS (HCC): Chronic | ICD-10-CM

## 2018-09-26 DIAGNOSIS — S82.132D: ICD-10-CM

## 2018-09-26 PROCEDURE — 99308 SBSQ NF CARE LOW MDM 20: CPT | Performed by: FAMILY MEDICINE

## 2018-09-26 NOTE — PROGRESS NOTES
Nursing Home Progress Note      Patient Name: Viviane Peñaloza   YOB: 1955    Date of Service: 8/8/2018      Facility: [place] []   [place] []   [facility] [x]   [facility] []   Other []       CHIEF COMPLAINT:  CMM/LTC     HISTORY OF PRESENT ILLNESS:  [x]  Follow Up visit for coordination of chronic medical management needs and issues.    Epilepsy/impaired mobility and ADLs/post displaced fracture of the medial condyle of the left tibia with routine healing    PAST MEDICAL & SURGICAL HISTORY:  Past Medical History:   Diagnosis Date   • Epilepsy (CMS/HCC)    • Fracture, clavicle     left    • Shoulder fracture, left       Past Surgical History:   Procedure Laterality Date   • OOPHORECTOMY          MEDICATIONS:  Medication administration record for Viviane Peñaloza reviewed and reconciled today.    ALLERGIES:  No Known Allergies     REVIEW OF SYSTEMS:  • Appetite: Fair []   Good [x]   Poor []   Weight Loss []  [x]  Weight Stable   Unavoidable Weight Loss []  Tolerating Tube Feeding []    Supplements Provided []   • Constitutional: Negative for fever, chills, diaphoresis or fatigue and weight change.   • HENT: No dysphagia; no changes to vision/hearing/smell/taste; no epistaxis  • Eyes: Negative for redness and visual disturbance.   • Respiratory: Negative for shortness of breath, chest pain, cough or chest tightness.   • Cardiovascular: Negative for chest pain and palpitations.   • Gastrointestinal: Negative for abdominal distention, abdominal pain and blood in stool.   • Endocrine: Negative for cold intolerance and heat intolerance.   • Genitourinary: Negative for difficulty urinating, dysuria and frequency.Negative for hematuria   • Musculoskeletal: Chronic myalgias and arthralgias  • Integumentary: No open wounds, rash or concerning skin lesions  • Neurological: Negative for syncope, weakness and headaches.  No seizure-like activity reported.  • Hematological: Negative for adenopathy. Does not bruise/bleed  easily.   • Immunological: Negative for reported allergies or immunological disorders  • Psychological: No depression, anxiety or suicidal ideation    PHYSICAL EXAMINATION:  VITAL SIGNS: /70, HR 74 BPM, RR 18, weight 204    General Appearance:  [x]  Alert   [x]  Oriented x 3  [x]  No acute distress    []  Confused  []  Disoriented   []  Comatose   Head:  Atraumatic and normocephalic, without obvious abnormality.   Eyes:          PERRLA, conjunctivae and sclerae normal, no Icterus. No pallor. Extra-occular movements are within normal limits.   Ears:  Ears appear intact with no abnormalities noted.   Throat: No oral lesions, no thrush, oral mucosa moist.   Neck: Supple, trachea midline, no thyromegaly, no carotid bruit.   Back:   No kyphoscoliosis. No tenderness to palpation.   Lungs:   Chest shape is normal.  Audible air exchange noted all lung fields.  No wheezing.    Heart:  Normal S1 and S2, no murmur, no gallop, no rub. No JVD.   Abdomen:   Normal bowel sounds, no masses, no organomegaly. Soft, non-tender, non-distended, no guarding    Extremities: Moves all extremities, edema, cyanosis or clubbing.  Generalized weakness noted to the left lower extremity.      Pulses: Pulses palpable and equal bilaterally.   Skin: No bleeding or rash.  Generalized dry skin noted.  Age-related atrophy of skin.   Neurologic: [x] Normal speech []  Normal mental status    [x] Cranial nerves II through XII intact   [x]  No anosmia [x]  DTR 2+ [x]  Proprioception intact  [x]  No focal motor/sensory deficits     Psych/Mood:        [x]  No acute changes []  Depressed  Urinary:        [x]  Continent  []  Incontinent  []  Retention  []  F/C     []  UTI w/treatment in progress  RECORD REVIEW:   • Consult notes []   • Admission and subsequent notes []   •  [x] I have personally reviewed and interpreted available lab data, radiology studies and ECG obtained at time of visit.  [x] Medication Review: I have reviewed the patients active  [facility]     ASSESSMENT   Diagnoses and all orders for this visit:    Nonintractable generalized idiopathic epilepsy without status epilepticus (CMS/HCC)    Impaired mobility and ADLs    Closed displaced fracture of medial condyle of left tibia with routine healing        PLAN  Continue supportive care as needed for mobilization and any assistance with ADLs.  No reported seizure-like activity/episodes from staff.  Continue current medication regimen, adjustments to be made as needed.  Routine surveillance labs.  Continue therapy as able to tolerate.  Appetite is good, hemodynamically stable on review of vital signs, noted 6 pound weight gain.    [x]  Discussed Patient in detail with nursing/staff, addressed all needs today.     [x]  Plan of Care Reviewed   []   PT/OT Reviewed   []  Order Changes  []   Discharge Plans Reviewed         Total face to face time spent with patient 25 minutes, 15 min in counseling.    Luigi Urena DO.  8/8/2018

## 2018-09-28 ENCOUNTER — NURSING HOME (OUTPATIENT)
Dept: FAMILY MEDICINE CLINIC | Facility: CLINIC | Age: 63
End: 2018-09-28

## 2018-09-28 DIAGNOSIS — E55.9 VITAMIN D DEFICIENCY: ICD-10-CM

## 2018-09-28 DIAGNOSIS — G40.309 NONINTRACTABLE GENERALIZED IDIOPATHIC EPILEPSY WITHOUT STATUS EPILEPTICUS (HCC): Chronic | ICD-10-CM

## 2018-09-28 DIAGNOSIS — S82.132D: ICD-10-CM

## 2018-09-28 DIAGNOSIS — Z74.09 IMPAIRED MOBILITY AND ADLS: Chronic | ICD-10-CM

## 2018-09-28 DIAGNOSIS — Z78.9 IMPAIRED MOBILITY AND ADLS: Chronic | ICD-10-CM

## 2018-09-28 DIAGNOSIS — R35.0 URINARY FREQUENCY: ICD-10-CM

## 2018-09-28 PROCEDURE — 99308 SBSQ NF CARE LOW MDM 20: CPT | Performed by: NURSE PRACTITIONER

## 2018-09-28 NOTE — PROGRESS NOTES
Nursing Home Follow Up Note      Luigi Urena DO []   [facility] Tony [x]  [address] [place] [address]  Phone: [phone]  Fax: [phone] Marcell Christina MD []    Rm Tran DO []   [address] [place] [address]  Phone: [phone]  Fax: [phone]     PATIENT NAME: Viviane Peñaloza                                                                          YOB: 1955           DATE OF SERVICE: 09/28/2018  FACILITY:  [][place]   [] [place]   [x] [facility]   [] [facility]   [] Other ______________________________________________________________________      CHIEF COMPLAINT:  CMM & LTC      HISTORY OF PRESENT ILLNESS:   Routine visit for coordination of long term care needs and chronic conditions    PAST MEDICAL & SURGICAL HISTORY:   Past Medical History:   Diagnosis Date   • Epilepsy (CMS/HCC)    • Fracture, clavicle     left    • Shoulder fracture, left       Past Surgical History:   Procedure Laterality Date   • OOPHORECTOMY           MEDICATIONS:  I have reviewed and reconciled the patients medication list in the patients chart at the skilled nursing facility today.      ALLERGIES:  No Known Allergies      SOCIAL HISTORY:  Social History     Social History   • Marital status: Single     Spouse name: N/A   • Number of children: N/A   • Years of education: N/A     Occupational History   • Not on file.     Social History Main Topics   • Smoking status: Never Smoker   • Smokeless tobacco: Never Used   • Alcohol use No   • Drug use: No   • Sexual activity: Defer     Other Topics Concern   • Not on file     Social History Narrative   • No narrative on file       FAMILY HISTORY:  Family History   Problem Relation Age of Onset   • Diabetes Mother    • Heart disease Mother    • Hypertension Mother    • Hypertension Father    • Diabetes Sister    • Hypertension Sister        REVIEW OF SYSTEMS:  Review of Systems   Constitutional: Negative for activity change, appetite  change, chills, diaphoresis, fatigue, fever, unexpected weight gain and unexpected weight loss.   HENT: Negative for congestion, ear pain, mouth sores, nosebleeds, postnasal drip, rhinorrhea, sinus pressure, sneezing, sore throat and trouble swallowing.    Eyes: Negative for blurred vision, pain, discharge, redness, itching and visual disturbance.   Respiratory: Negative for apnea, cough, choking, chest tightness, shortness of breath, wheezing and stridor.    Cardiovascular: Negative for chest pain, palpitations and leg swelling.   Gastrointestinal: Negative for abdominal distention, abdominal pain, blood in stool, constipation, diarrhea, nausea, vomiting, GERD and indigestion.   Endocrine: Negative for polydipsia and polyuria.   Genitourinary: Positive for frequency. Negative for urinary incontinence, decreased urine volume, difficulty urinating, dysuria, flank pain, hematuria and urgency.   Musculoskeletal: Positive for arthralgias. Negative for back pain, gait problem, joint swelling and myalgias.   Skin: Negative for color change, dry skin, rash and skin lesions.   Allergic/Immunologic: Negative for environmental allergies.   Neurological: Positive for memory problem. Negative for dizziness, seizures, speech difficulty, weakness and confusion.   Psychiatric/Behavioral: Negative for behavioral problems, dysphoric mood, hallucinations, sleep disturbance and depressed mood. The patient is not nervous/anxious.          PHYSICAL EXAMINATION:   VITAL SIGNS: /72, HR 74 bpm, RR 20, 97% RA, Temp 97.6, weight 201    Physical Exam   Constitutional: She appears well-developed and well-nourished.   HENT:   Head: Normocephalic.   Right Ear: External ear normal.   Left Ear: External ear normal.   Nose: Nose normal.   Eyes: Conjunctivae are normal.   Neck: Normal range of motion.   Cardiovascular: Normal rate, regular rhythm, normal heart sounds and intact distal pulses.    Pulmonary/Chest: Effort normal and breath sounds  normal. No respiratory distress. She has no wheezes. She has no rales.   Abdominal: Soft. Bowel sounds are normal. She exhibits no distension and no mass. There is no tenderness. No hernia.   Musculoskeletal: She exhibits edema (Bilateral lower extremities).   NROM all major joints   Neurological: She is alert.   Skin: Skin is warm and dry. No rash noted.   Psychiatric: She has a normal mood and affect. Her behavior is normal.   Nursing note and vitals reviewed.      RECORDS REVIEW:   I have reviewed and interpreted the following lab test results  obtained at the time of the visit today.     ASSESSMENT     Diagnoses and all orders for this visit:    Nonintractable generalized idiopathic epilepsy without status epilepticus (CMS/HCC)    Impaired mobility and ADLs    Closed displaced fracture of medial condyle of left tibia with routine healing    Vitamin D deficiency    Urinary frequency        PLAN  Continue supportive care as needed for mobilization and any assistance with ADLs.  No reported seizure-like activity/episodes from staff.  Keppra level 17 on 08/24/18 and phenobarbital level 21 on 07/02/18.  Continue current medication regimen, adjustments to be made as needed.  Routine surveillance labs.  Continue therapy as able to tolerate.  Vitamin D level 18 on 09/03/18.  Appetite is good, hemodynamically stable on review of vital signs, noted 2 pound weight gain.  UA abnormal, culture pending at this time.    [x]  Discussed Patient in detail with nursing/staff, addressed all needs today.     [x]  Plan of Care Reviewed   []  PT/OT Reviewed   []  Order Changes  []  Discharge Plans Reviewed  [x]  Advance Directive on file with Nursing Home.   [x]  POA on file with Nursing Home.   []  Code Status listed: [x]  Full Code   []  DNR         Marlene Garcia, [facility]ONIA.

## 2018-10-03 ENCOUNTER — NURSING HOME (OUTPATIENT)
Dept: FAMILY MEDICINE CLINIC | Facility: CLINIC | Age: 63
End: 2018-10-03

## 2018-10-03 DIAGNOSIS — M81.0 OSTEOPOROSIS WITHOUT CURRENT PATHOLOGICAL FRACTURE, UNSPECIFIED OSTEOPOROSIS TYPE: ICD-10-CM

## 2018-10-03 DIAGNOSIS — Z78.9 IMPAIRED MOBILITY AND ADLS: Primary | Chronic | ICD-10-CM

## 2018-10-03 DIAGNOSIS — E55.9 VITAMIN D DEFICIENCY: ICD-10-CM

## 2018-10-03 DIAGNOSIS — Z74.09 IMPAIRED MOBILITY AND ADLS: Primary | Chronic | ICD-10-CM

## 2018-10-03 DIAGNOSIS — G40.309 NONINTRACTABLE GENERALIZED IDIOPATHIC EPILEPSY WITHOUT STATUS EPILEPTICUS (HCC): Chronic | ICD-10-CM

## 2018-10-03 PROCEDURE — 99309 SBSQ NF CARE MODERATE MDM 30: CPT | Performed by: FAMILY MEDICINE

## 2018-10-09 NOTE — PROGRESS NOTES
Nursing Home Progress Note      Patient Name: Viviane Peñaloza   YOB: 1955    Date of Service: 8/15/2018      Facility: [place] []   [place] []   [facility] [x]   [facility] []   Other []       CHIEF COMPLAINT:  CMM/LTC     HISTORY OF PRESENT ILLNESS:  [x]  Follow Up visit for coordination of chronic medical management needs and issues.    Epilepsy/impaired mobility and ADLs/post displaced fracture of the medial condyle of the left tibia with routine healing    PAST MEDICAL & SURGICAL HISTORY:  Past Medical History:   Diagnosis Date   • Epilepsy (CMS/HCC)    • Fracture, clavicle     left    • Shoulder fracture, left       Past Surgical History:   Procedure Laterality Date   • OOPHORECTOMY          MEDICATIONS:  Medication administration record for Viviane Peñaloza reviewed and reconciled today.    ALLERGIES:  No Known Allergies     REVIEW OF SYSTEMS:  • Appetite: Fair []   Good [x]   Poor []   Weight Loss []  [x]  Weight Stable   Unavoidable Weight Loss []  Tolerating Tube Feeding []    Supplements Provided []   • Constitutional: Negative for fever, chills, diaphoresis or fatigue and weight change.   • HENT: No dysphagia; no changes to vision/hearing/smell/taste; no epistaxis  • Eyes: Negative for redness and visual disturbance.   • Respiratory: Negative for shortness of breath, chest pain, cough or chest tightness.   • Cardiovascular: Negative for chest pain and palpitations.   • Gastrointestinal: Negative for abdominal distention, abdominal pain and blood in stool.   • Endocrine: Negative for cold intolerance and heat intolerance.   • Genitourinary: Negative for difficulty urinating, dysuria and frequency.Negative for hematuria   • Musculoskeletal: Chronic myalgias and arthralgias  • Integumentary: No open wounds, rash or concerning skin lesions  • Neurological: Negative for syncope, weakness and headaches.  No seizure-like activity reported.  • Hematological: Negative for adenopathy. Does not bruise/bleed  easily.   • Immunological: Negative for reported allergies or immunological disorders  • Psychological: No depression, anxiety or suicidal ideation    PHYSICAL EXAMINATION:  VITAL SIGNS: /70, HR 64 BPM, RR 16, weight 204    General Appearance:  [x]  Alert   [x]  Oriented x 3  [x]  No acute distress    []  Confused  []  Disoriented   []  Comatose   Head:  Atraumatic and normocephalic, without obvious abnormality.   Eyes:          PERRLA, conjunctivae and sclerae normal, no Icterus. No pallor. Extra-occular movements are within normal limits.   Ears:  Ears appear intact with no abnormalities noted.   Throat: No oral lesions, no thrush, oral mucosa moist.   Neck: Supple, trachea midline, no thyromegaly, no carotid bruit.   Back:   No kyphoscoliosis. No tenderness to palpation.   Lungs:   Chest shape is normal.  Audible air exchange noted all lung fields.  No wheezing.    Heart:  Normal S1 and S2, no murmur, no gallop, no rub. No JVD.   Abdomen:   Normal bowel sounds, no masses, no organomegaly. Soft, non-tender, non-distended, no guarding    Extremities: Moves all extremities, edema, cyanosis or clubbing.  Generalized weakness noted to the left lower extremity.      Pulses: Pulses palpable and equal bilaterally.   Skin: No bleeding or rash.  Generalized dry skin noted.  Age-related atrophy of skin.   Neurologic: [x] Normal speech []  Normal mental status    [x] Cranial nerves II through XII intact   [x]  No anosmia [x]  DTR 2+ [x]  Proprioception intact  [x]  No focal motor/sensory deficits     Psych/Mood:        [x]  No acute changes []  Depressed  Urinary:        [x]  Continent  []  Incontinent  []  Retention  []  F/C     []  UTI w/treatment in progress  RECORD REVIEW:   • Consult notes []   • Admission and subsequent notes []   •  [x] I have personally reviewed and interpreted available lab data, radiology studies and ECG obtained at time of visit.  [x] Medication Review: I have reviewed the patients active  and prn medications at [facility]     ASSESSMENT   Diagnoses and all orders for this visit:    Closed displaced fracture of medial condyle of left tibia with routine healing    Nonintractable generalized idiopathic epilepsy without status epilepticus (CMS/HCC)    Vitamin D deficiency    Impaired mobility and ADLs        PLAN  Continue supportive care as needed for mobilization and any assistance with ADLs.  No reported seizure-like activity/episodes from staff.  Continue current medication regimen, adjustments to be made as needed.  Routine surveillance labs.  Continue therapy as able to tolerate.  Appetite is good, hemodynamically stable on review of vital signs, noted weight stability.    [x]  Discussed Patient in detail with nursing/staff, addressed all needs today.     [x]  Plan of Care Reviewed   []   PT/OT Reviewed   []  Order Changes  []   Discharge Plans Reviewed         Total face to face time spent with patient 25 minutes, 15 min in counseling.    Luigi Urena DO.  8/15/2018

## 2018-10-11 NOTE — PROGRESS NOTES
Nursing Home Progress Note      Patient Name: Viviane Peñaloza   YOB: 1955    Date of Service: 8/22/2018      Facility: [place] []   [place] []   [facility] [x]   [facility] []   Other []       CHIEF COMPLAINT:  CMM/LTC     HISTORY OF PRESENT ILLNESS:  [x]  Follow Up visit for coordination of chronic medical management needs and issues.    Epilepsy/impaired mobility and ADLs/post displaced fracture of the medial condyle of the left tibia with routine healing    PAST MEDICAL & SURGICAL HISTORY:  Past Medical History:   Diagnosis Date   • Epilepsy (CMS/HCC)    • Fracture, clavicle     left    • Shoulder fracture, left       Past Surgical History:   Procedure Laterality Date   • OOPHORECTOMY          MEDICATIONS:  Medication administration record for Viviane Peñaloza reviewed and reconciled today.    ALLERGIES:  No Known Allergies     REVIEW OF SYSTEMS:  • Appetite: Fair []   Good [x]   Poor []   Weight Loss []  [x]  Weight Stable   Unavoidable Weight Loss []  Tolerating Tube Feeding []    Supplements Provided []   • Constitutional: Negative for fever, chills, diaphoresis or fatigue and weight change.   • HENT: No dysphagia; no changes to vision/hearing/smell/taste; no epistaxis  • Eyes: Negative for redness and visual disturbance.   • Respiratory: Negative for shortness of breath, chest pain, cough or chest tightness.   • Cardiovascular: Negative for chest pain and palpitations.   • Gastrointestinal: Negative for abdominal distention, abdominal pain and blood in stool.   • Endocrine: Negative for cold intolerance and heat intolerance.   • Genitourinary: Negative for difficulty urinating, dysuria and frequency.Negative for hematuria   • Musculoskeletal: Chronic myalgias and arthralgias  • Integumentary: No open wounds, rash or concerning skin lesions  • Neurological: Negative for syncope, weakness and headaches.  No seizure-like activity reported.  • Hematological: Negative for adenopathy. Does not bruise/bleed  easily.   • Immunological: Negative for reported allergies or immunological disorders  • Psychological: No depression, anxiety or suicidal ideation    PHYSICAL EXAMINATION:  VITAL SIGNS: /70, HR 64 BPM, RR 16, weight 204    General Appearance:  [x]  Alert   [x]  Oriented x 3  [x]  No acute distress    []  Confused  []  Disoriented   []  Comatose   Head:  Atraumatic and normocephalic, without obvious abnormality.   Eyes:          PERRLA, conjunctivae and sclerae normal, no Icterus. No pallor. Extra-occular movements are within normal limits.   Ears:  Ears appear intact with no abnormalities noted.   Throat: No oral lesions, no thrush, oral mucosa moist.   Neck: Supple, trachea midline, no thyromegaly, no carotid bruit.   Back:   No kyphoscoliosis. No tenderness to palpation.   Lungs:   Chest shape is normal.  Audible air exchange noted all lung fields.  No wheezing.    Heart:  Normal S1 and S2, no murmur, no gallop, no rub. No JVD.   Abdomen:   Normal bowel sounds, no masses, no organomegaly. Soft, non-tender, non-distended, no guarding    Extremities: Moves all extremities, edema, cyanosis or clubbing.  Generalized weakness noted to the left lower extremity.      Pulses: Pulses palpable and equal bilaterally.   Skin: No bleeding or rash.  Generalized dry skin noted.  Age-related atrophy of skin.   Neurologic: [x] Normal speech []  Normal mental status    [x] Cranial nerves II through XII intact   [x]  No anosmia [x]  DTR 2+ [x]  Proprioception intact  [x]  No focal motor/sensory deficits     Psych/Mood:        [x]  No acute changes []  Depressed  Urinary:        [x]  Continent  []  Incontinent  []  Retention  []  F/C     []  UTI w/treatment in progress  RECORD REVIEW:   • Consult notes []   • Admission and subsequent notes []   •  [x] I have personally reviewed and interpreted available lab data, radiology studies and ECG obtained at time of visit.  [x] Medication Review: I have reviewed the patients active  and prn medications at [facility]     ASSESSMENT   Diagnoses and all orders for this visit:    Nonintractable generalized idiopathic epilepsy without status epilepticus (CMS/HCC)    Vitamin D deficiency    Impaired mobility and ADLs    Closed displaced fracture of medial condyle of left tibia with routine healing        PLAN  Continue supportive care as needed for mobilization and any assistance with ADLs.  No reported seizure-like activity/episodes from staff since last visit.  Continue current medication regimen, adjustments to be made as needed.  Routine surveillance labs, adjustment to medication dosing as needed.  Continue therapy as able to tolerate.  Appetite is good, hemodynamically stable with vital signs, noted weight stability.    [x]  Discussed Patient in detail with nursing/staff, addressed all needs today.     [x]  Plan of Care Reviewed   []   PT/OT Reviewed   []  Order Changes  []   Discharge Plans Reviewed         Total face to face time spent with patient 25 minutes, 15 min in counseling.    Luigi Urena DO.  8/22/2018

## 2018-10-17 ENCOUNTER — NURSING HOME (OUTPATIENT)
Dept: FAMILY MEDICINE CLINIC | Facility: CLINIC | Age: 63
End: 2018-10-17

## 2018-10-17 DIAGNOSIS — Z74.09 IMPAIRED MOBILITY AND ADLS: Chronic | ICD-10-CM

## 2018-10-17 DIAGNOSIS — S62.202A CLOSED DISPLACED FRACTURE OF FIRST METACARPAL BONE OF LEFT HAND, UNSPECIFIED PORTION OF METACARPAL, INITIAL ENCOUNTER: ICD-10-CM

## 2018-10-17 DIAGNOSIS — M81.0 OSTEOPOROSIS WITHOUT CURRENT PATHOLOGICAL FRACTURE, UNSPECIFIED OSTEOPOROSIS TYPE: ICD-10-CM

## 2018-10-17 DIAGNOSIS — G40.309 NONINTRACTABLE GENERALIZED IDIOPATHIC EPILEPSY WITHOUT STATUS EPILEPTICUS (HCC): Chronic | ICD-10-CM

## 2018-10-17 DIAGNOSIS — Z78.9 IMPAIRED MOBILITY AND ADLS: Chronic | ICD-10-CM

## 2018-10-17 DIAGNOSIS — E55.9 VITAMIN D DEFICIENCY: Primary | ICD-10-CM

## 2018-10-17 DIAGNOSIS — S82.132D: ICD-10-CM

## 2018-10-17 PROCEDURE — 99309 SBSQ NF CARE MODERATE MDM 30: CPT | Performed by: FAMILY MEDICINE

## 2018-10-24 ENCOUNTER — NURSING HOME (OUTPATIENT)
Dept: FAMILY MEDICINE CLINIC | Facility: CLINIC | Age: 63
End: 2018-10-24

## 2018-10-24 DIAGNOSIS — M81.0 OSTEOPOROSIS WITHOUT CURRENT PATHOLOGICAL FRACTURE, UNSPECIFIED OSTEOPOROSIS TYPE: ICD-10-CM

## 2018-10-24 DIAGNOSIS — S82.132D: ICD-10-CM

## 2018-10-24 DIAGNOSIS — E55.9 VITAMIN D DEFICIENCY: Primary | ICD-10-CM

## 2018-10-24 DIAGNOSIS — Z78.9 IMPAIRED MOBILITY AND ADLS: Chronic | ICD-10-CM

## 2018-10-24 DIAGNOSIS — G40.309 NONINTRACTABLE GENERALIZED IDIOPATHIC EPILEPSY WITHOUT STATUS EPILEPTICUS (HCC): Chronic | ICD-10-CM

## 2018-10-24 DIAGNOSIS — Z74.09 IMPAIRED MOBILITY AND ADLS: Chronic | ICD-10-CM

## 2018-10-24 PROCEDURE — 99309 SBSQ NF CARE MODERATE MDM 30: CPT | Performed by: FAMILY MEDICINE

## 2018-10-24 NOTE — PROGRESS NOTES
Nursing Home Progress Note      Patient Name: Viviane Peñaloza   YOB: 1955    Date of Service: 8/29/2018      Facility: [place] []   [place] []   [facility] [x]   [facility] []   Other []       CHIEF COMPLAINT:  CMM/LTC     HISTORY OF PRESENT ILLNESS:  [x]  Follow Up visit for coordination of chronic medical management needs and issues.    Epilepsy/impaired mobility and ADLs/post displaced fracture of the medial condyle of the left tibia with routine healing    PAST MEDICAL & SURGICAL HISTORY:  Past Medical History:   Diagnosis Date   • Epilepsy (CMS/HCC)    • Fracture, clavicle     left    • Shoulder fracture, left       Past Surgical History:   Procedure Laterality Date   • OOPHORECTOMY          MEDICATIONS:  Medication administration record for Viviane Peñaloza reviewed and reconciled today.    ALLERGIES:  No Known Allergies     REVIEW OF SYSTEMS:  • Appetite: Fair []   Good [x]   Poor []   Weight Loss []  [x]  Weight Stable   Unavoidable Weight Loss []  Tolerating Tube Feeding []    Supplements Provided []   • Constitutional: Negative for fever, chills, diaphoresis or fatigue and weight change.   • HENT: No dysphagia; no changes to vision/hearing/smell/taste; no epistaxis  • Eyes: Negative for redness and visual disturbance.   • Respiratory: Negative for shortness of breath, chest pain, cough or chest tightness.   • Cardiovascular: Negative for chest pain and palpitations.   • Gastrointestinal: Negative for abdominal distention, abdominal pain and blood in stool.   • Endocrine: Negative for cold intolerance and heat intolerance.   • Genitourinary: Negative for difficulty urinating, dysuria and frequency.Negative for hematuria   • Musculoskeletal: Chronic myalgias and arthralgias  • Integumentary: No open wounds, rash or concerning skin lesions  • Neurological: Negative for syncope, weakness and headaches.  No seizure-like activity reported.  • Hematological: Negative for adenopathy. Does not bruise/bleed  easily.   • Immunological: Negative for reported allergies or immunological disorders  • Psychological: No depression, anxiety or suicidal ideation    PHYSICAL EXAMINATION:  VITAL SIGNS: /70, HR 72 bpm, weight 204, RR 18    General Appearance:  [x]  Alert   [x]  Oriented x 3  [x]  No acute distress    []  Confused  []  Disoriented   []  Comatose   Head:  Atraumatic and normocephalic, without obvious abnormality.   Eyes:          PERRLA, conjunctivae and sclerae normal, no Icterus. No pallor. Extra-occular movements are within normal limits.   Ears:  Ears appear intact with no abnormalities noted.   Throat: No oral lesions, no thrush, oral mucosa moist.   Neck: Supple, trachea midline, no thyromegaly, no carotid bruit.   Back:   No kyphoscoliosis. No tenderness to palpation.   Lungs:   Chest shape is normal.  Audible air exchange noted all lung fields.  No wheezing.    Heart:  Normal S1 and S2, no murmur, no gallop, no rub. No JVD.   Abdomen:   Normal bowel sounds, no masses, no organomegaly. Soft, non-tender, non-distended, no guarding    Extremities: Moves all extremities, edema, cyanosis or clubbing.  Generalized weakness noted to the left lower extremity.      Pulses: Pulses palpable and equal bilaterally.   Skin: No bleeding or rash.  Generalized dry skin noted.  Age-related atrophy of skin.   Neurologic: [x] Normal speech []  Normal mental status    [x] Cranial nerves II through XII intact   [x]  No anosmia [x]  DTR 2+ [x]  Proprioception intact  [x]  No focal motor/sensory deficits     Psych/Mood:        [x]  No acute changes []  Depressed  Urinary:        [x]  Continent  []  Incontinent  []  Retention  []  F/C     []  UTI w/treatment in progress  RECORD REVIEW:   • Consult notes []   • Admission and subsequent notes []   •  [x] I have personally reviewed and interpreted available lab data, radiology studies and ECG obtained at time of visit.  [x] Medication Review: I have reviewed the patients active  and prn medications at [facility]     ASSESSMENT   Diagnoses and all orders for this visit:    Nonintractable generalized idiopathic epilepsy without status epilepticus (CMS/HCC)    Vitamin D deficiency    Impaired mobility and ADLs    Closed displaced fracture of medial condyle of left tibia with routine healing        PLAN  Continue supportive care as needed for mobilization and any assistance with ADLs.  No reported seizure-like activity/episodes from staff since last visit.  Continue current medication regimen, adjustments to be made as needed.  Routine surveillance labs, adjustment to medication dosing as needed.  Continue therapy as able to tolerate.  Appetite normal, hemodynamically stable on review of vital signs, noted weight stability.    [x]  Discussed Patient in detail with nursing/staff, addressed all needs today.     [x]  Plan of Care Reviewed   []   PT/OT Reviewed   []  Order Changes  []   Discharge Plans Reviewed         Total face to face time spent with patient 25 minutes, 15 min in counseling.    Luigi Urena DO.  8/29/2018

## 2018-10-26 ENCOUNTER — NURSING HOME (OUTPATIENT)
Dept: FAMILY MEDICINE CLINIC | Facility: CLINIC | Age: 63
End: 2018-10-26

## 2018-10-26 DIAGNOSIS — S82.132D: ICD-10-CM

## 2018-10-26 DIAGNOSIS — Z74.09 IMPAIRED MOBILITY AND ADLS: Chronic | ICD-10-CM

## 2018-10-26 DIAGNOSIS — E55.9 VITAMIN D DEFICIENCY: ICD-10-CM

## 2018-10-26 DIAGNOSIS — Z78.9 IMPAIRED MOBILITY AND ADLS: Chronic | ICD-10-CM

## 2018-10-26 DIAGNOSIS — G40.309 NONINTRACTABLE GENERALIZED IDIOPATHIC EPILEPSY WITHOUT STATUS EPILEPTICUS (HCC): Chronic | ICD-10-CM

## 2018-10-26 PROCEDURE — 99308 SBSQ NF CARE LOW MDM 20: CPT | Performed by: NURSE PRACTITIONER

## 2018-10-26 NOTE — PROGRESS NOTES
Nursing Home Follow Up Note      Luigi Urena DO []   [facility] [x]  [address] [place] [address]  Phone: [phone]  Fax: [phone] Marcell Christina MD []    Rm Tran DO []   [address] [place] [address]  Phone: [phone]  Fax: [phone]     PATIENT NAME: Viviane Peñaloza                                                                          YOB: 1955           DATE OF SERVICE: 09/28/2018  FACILITY:  [][place]   [] [place]   [x] [facility]   [] [facility]   [] Other ______________________________________________________________________      CHIEF COMPLAINT:  CMM & LTC      HISTORY OF PRESENT ILLNESS:   Routine visit for coordination of long term care needs and chronic conditions. Patient went to see hand specialist today, for follow up on her fracture.     PAST MEDICAL & SURGICAL HISTORY:   Past Medical History:   Diagnosis Date   • Epilepsy (CMS/HCC)    • Fracture, clavicle     left    • Shoulder fracture, left       Past Surgical History:   Procedure Laterality Date   • OOPHORECTOMY           MEDICATIONS:  I have reviewed and reconciled the patients medication list in the patients chart at the skilled nursing facility today.      ALLERGIES:  No Known Allergies      SOCIAL HISTORY:  Social History     Social History   • Marital status: Single     Spouse name: N/A   • Number of children: N/A   • Years of education: N/A     Occupational History   • Not on file.     Social History Main Topics   • Smoking status: Never Smoker   • Smokeless tobacco: Never Used   • Alcohol use No   • Drug use: No   • Sexual activity: Defer     Other Topics Concern   • Not on file     Social History Narrative   • No narrative on file       FAMILY HISTORY:  Family History   Problem Relation Age of Onset   • Diabetes Mother    • Heart disease Mother    • Hypertension Mother    • Hypertension Father    • Diabetes Sister    • Hypertension Sister        REVIEW OF  SYSTEMS:  Review of Systems   Constitutional: Negative for activity change, appetite change, chills, diaphoresis, fatigue, fever, unexpected weight gain and unexpected weight loss.   HENT: Negative for congestion, ear pain, mouth sores, nosebleeds, postnasal drip, rhinorrhea, sinus pressure, sneezing, sore throat and trouble swallowing.    Eyes: Negative for blurred vision, pain, discharge, redness, itching and visual disturbance.   Respiratory: Negative for apnea, cough, choking, chest tightness, shortness of breath, wheezing and stridor.    Cardiovascular: Negative for chest pain, palpitations and leg swelling.   Gastrointestinal: Negative for abdominal distention, abdominal pain, blood in stool, constipation, diarrhea, nausea, vomiting, GERD and indigestion.   Endocrine: Negative for polydipsia and polyuria.   Genitourinary: Positive for frequency. Negative for urinary incontinence, decreased urine volume, difficulty urinating, dysuria, flank pain, hematuria and urgency.   Musculoskeletal: Positive for arthralgias. Negative for back pain, gait problem, joint swelling and myalgias.   Skin: Negative for color change, dry skin, rash and skin lesions.   Allergic/Immunologic: Negative for environmental allergies.   Neurological: Positive for memory problem. Negative for dizziness, seizures, speech difficulty, weakness and confusion.   Psychiatric/Behavioral: Negative for behavioral problems, dysphoric mood, hallucinations, sleep disturbance and depressed mood. The patient is not nervous/anxious.          PHYSICAL EXAMINATION:   VITAL SIGNS: /59, HR 84 bpm, RR 20, 97% RA, Temp 97.3, weight 198    Physical Exam   Constitutional: She appears well-developed and well-nourished.   HENT:   Head: Normocephalic.   Right Ear: External ear normal.   Left Ear: External ear normal.   Nose: Nose normal.   Eyes: Conjunctivae are normal.   Neck: Normal range of motion.   Cardiovascular: Normal rate, regular rhythm, normal heart  sounds and intact distal pulses.    Pulmonary/Chest: Effort normal and breath sounds normal. No respiratory distress. She has no wheezes. She has no rales.   Abdominal: Soft. Bowel sounds are normal. She exhibits no distension and no mass. There is no tenderness. No hernia.   Musculoskeletal: She exhibits edema (Bilateral lower extremities).   NROM all major joints   Neurological: She is alert.   Skin: Skin is warm and dry. No rash noted.   Psychiatric: She has a normal mood and affect. Her behavior is normal.   Nursing note and vitals reviewed.      RECORDS REVIEW:   I have reviewed and interpreted the following lab test results  obtained at the time of the visit today. See below.     ASSESSMENT     Diagnoses and all orders for this visit:    Nonintractable generalized idiopathic epilepsy without status epilepticus (CMS/HCC)    Impaired mobility and ADLs    Vitamin D deficiency    Closed displaced fracture of medial condyle of left tibia with routine healing        PLAN  Continue supportive care as needed for mobilization and any assistance with ADLs.  No reported seizure-like activity/episodes from staff.  Keppra level 17 on 08/24/18 and phenobarbital level 21 on 07/02/18.  Continue current medication regimen, adjustments to be made as needed.  Routine surveillance labs.  Continue therapy as able to tolerate.  Vitamin D level 18 on 09/03/18.  Appetite is good, hemodynamically stable on review of vital signs. BMP normal on 10/9 except sodium 129.    Patient followed up with hand surgeon today, regarding her fracture. Awaiting report.     [x]  Discussed Patient in detail with nursing/staff, addressed all needs today.     [x]  Plan of Care Reviewed   []  PT/OT Reviewed   []  Order Changes  []  Discharge Plans Reviewed  [x]  Advance Directive on file with Nursing Home.   [x]  POA on file with Nursing Home.   []  Code Status listed: [x]  Full Code   []  DNR         Marlene Garcia, APRN.

## 2018-10-31 ENCOUNTER — NURSING HOME (OUTPATIENT)
Dept: FAMILY MEDICINE CLINIC | Facility: CLINIC | Age: 63
End: 2018-10-31

## 2018-10-31 DIAGNOSIS — E55.9 VITAMIN D DEFICIENCY: ICD-10-CM

## 2018-10-31 DIAGNOSIS — M81.0 OSTEOPOROSIS WITHOUT CURRENT PATHOLOGICAL FRACTURE, UNSPECIFIED OSTEOPOROSIS TYPE: ICD-10-CM

## 2018-10-31 DIAGNOSIS — S62.601D FRACTURE OF UNSPECIFIED PHALANX OF LEFT INDEX FINGER, SUBSEQUENT ENCOUNTER FOR FRACTURE WITH ROUTINE HEALING: ICD-10-CM

## 2018-10-31 DIAGNOSIS — Z74.09 IMPAIRED MOBILITY AND ADLS: Primary | Chronic | ICD-10-CM

## 2018-10-31 DIAGNOSIS — S82.132D: ICD-10-CM

## 2018-10-31 DIAGNOSIS — G40.309 NONINTRACTABLE GENERALIZED IDIOPATHIC EPILEPSY WITHOUT STATUS EPILEPTICUS (HCC): Chronic | ICD-10-CM

## 2018-10-31 DIAGNOSIS — Z78.9 IMPAIRED MOBILITY AND ADLS: Primary | Chronic | ICD-10-CM

## 2018-10-31 PROCEDURE — 99309 SBSQ NF CARE MODERATE MDM 30: CPT | Performed by: FAMILY MEDICINE

## 2018-11-07 NOTE — PROGRESS NOTES
Nursing Home Progress Note      Patient Name: Viviane Peñaloza   YOB: 1955    Date of Service: 9/5/2018      Facility: [place] []   [place] []   [facility] [x]   [facility] []   Other []       CHIEF COMPLAINT:  CMM/LTC     HISTORY OF PRESENT ILLNESS:  [x]  Follow Up visit for coordination of chronic medical management needs and issues.    Epilepsy/impaired mobility and ADLs/post displaced fracture of the medial condyle of the left tibia with routine healing    PAST MEDICAL & SURGICAL HISTORY:  Past Medical History:   Diagnosis Date   • Epilepsy (CMS/HCC)    • Fracture, clavicle     left    • Shoulder fracture, left       Past Surgical History:   Procedure Laterality Date   • OOPHORECTOMY          MEDICATIONS:  Medication administration record for Viviane Peñaloza reviewed and reconciled today.    ALLERGIES:  No Known Allergies     REVIEW OF SYSTEMS:  • Appetite: Fair []   Good [x]   Poor []   Weight Loss []  [x]  Weight Stable   Unavoidable Weight Loss []  Tolerating Tube Feeding []    Supplements Provided []   • Constitutional: Negative for fever, chills, diaphoresis or fatigue and weight change.   • HENT: No dysphagia; no changes to vision/hearing/smell/taste; no epistaxis  • Eyes: Negative for redness and visual disturbance.   • Respiratory: Negative for shortness of breath, chest pain, cough or chest tightness.   • Cardiovascular: Negative for chest pain and palpitations.   • Gastrointestinal: Negative for abdominal distention, abdominal pain and blood in stool.   • Endocrine: Negative for cold intolerance and heat intolerance.   • Genitourinary: Negative for difficulty urinating, dysuria and frequency.Negative for hematuria   • Musculoskeletal: Chronic myalgias and arthralgias  • Integumentary: No open wounds, rash or concerning skin lesions  • Neurological: Negative for syncope, weakness and headaches.  No seizure-like activity reported.  • Hematological: Negative for adenopathy. Does not bruise/bleed  easily.   • Immunological: Negative for reported allergies or immunological disorders  • Psychological: No depression, anxiety or suicidal ideation    PHYSICAL EXAMINATION:  VITAL SIGNS: /76, HR 70 bpm, RR 20, weight 201    General Appearance:  [x]  Alert   [x]  Oriented x 3  [x]  No acute distress    []  Confused  []  Disoriented   []  Comatose   Head:  Atraumatic and normocephalic, without obvious abnormality.   Eyes:          PERRLA, conjunctivae and sclerae normal, no Icterus. No pallor. Extra-occular movements are within normal limits.   Ears:  Ears appear intact with no abnormalities noted.   Throat: No oral lesions, no thrush, oral mucosa moist.   Neck: Supple, trachea midline, no thyromegaly, no carotid bruit.   Back:   No kyphoscoliosis. No tenderness to palpation.   Lungs:   Chest shape is normal.  Audible air exchange noted all lung fields.  No wheezing.    Heart:  Normal S1 and S2, no murmur, no gallop, no rub. No JVD.   Abdomen:   Normal bowel sounds, no masses, no organomegaly. Soft, non-tender, non-distended, no guarding    Extremities: Moves all extremities, edema, cyanosis or clubbing.  Generalized weakness noted to the left lower extremity.      Pulses: Pulses palpable and equal bilaterally.   Skin: No bleeding or rash.  Generalized dry skin noted.  Age-related atrophy of skin.   Neurologic: [x] Normal speech []  Normal mental status    [x] Cranial nerves II through XII intact   [x]  No anosmia [x]  DTR 2+ [x]  Proprioception intact  [x]  No focal motor/sensory deficits     Psych/Mood:        [x]  No acute changes []  Depressed  Urinary:        [x]  Continent  []  Incontinent  []  Retention  []  F/C     []  UTI w/treatment in progress  RECORD REVIEW:   • Consult notes []   • Admission and subsequent notes []   •  [x] I have personally reviewed and interpreted available lab data, radiology studies and ECG obtained at time of visit.  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  No reported seizure-like activity/episodes from staff since last visit.  Continue current medication regimen, adjustments to be made as needed.  Routine surveillance labs, adjustment to medication dosing as needed.  Plan to continue therapy as tolerated and as long as remains beneficial.  Appetite normal, hemodynamically stable on review of vital signs, noted relative weight stability, approximate 3 pound loss.    [x]  Discussed Patient in detail with nursing/staff, addressed all needs today.     [x]  Plan of Care Reviewed   []   PT/OT Reviewed   []  Order Changes  []   Discharge Plans Reviewed         Total face to face time spent with patient 25 minutes, 15 min in counseling.    Luigi Urena DO.  9/5/2018

## 2018-11-21 ENCOUNTER — NURSING HOME (OUTPATIENT)
Dept: FAMILY MEDICINE CLINIC | Facility: CLINIC | Age: 63
End: 2018-11-21

## 2018-11-21 DIAGNOSIS — G40.309 NONINTRACTABLE GENERALIZED IDIOPATHIC EPILEPSY WITHOUT STATUS EPILEPTICUS (HCC): Chronic | ICD-10-CM

## 2018-11-21 DIAGNOSIS — E55.9 VITAMIN D DEFICIENCY: ICD-10-CM

## 2018-11-21 DIAGNOSIS — S82.132D: ICD-10-CM

## 2018-11-21 DIAGNOSIS — Z78.9 IMPAIRED MOBILITY AND ADLS: Primary | Chronic | ICD-10-CM

## 2018-11-21 DIAGNOSIS — S62.601A FRACTURE OF UNSPECIFIED PHALANX OF LEFT INDEX FINGER, INITIAL ENCOUNTER FOR CLOSED FRACTURE: ICD-10-CM

## 2018-11-21 DIAGNOSIS — Z74.09 IMPAIRED MOBILITY AND ADLS: Primary | Chronic | ICD-10-CM

## 2018-11-21 PROCEDURE — 99308 SBSQ NF CARE LOW MDM 20: CPT | Performed by: FAMILY MEDICINE

## 2018-11-30 ENCOUNTER — NURSING HOME (OUTPATIENT)
Dept: FAMILY MEDICINE CLINIC | Facility: CLINIC | Age: 63
End: 2018-11-30

## 2018-11-30 DIAGNOSIS — Z74.09 IMPAIRED MOBILITY AND ADLS: Chronic | ICD-10-CM

## 2018-11-30 DIAGNOSIS — M81.0 OSTEOPOROSIS WITHOUT CURRENT PATHOLOGICAL FRACTURE, UNSPECIFIED OSTEOPOROSIS TYPE: ICD-10-CM

## 2018-11-30 DIAGNOSIS — Z78.9 IMPAIRED MOBILITY AND ADLS: Chronic | ICD-10-CM

## 2018-11-30 DIAGNOSIS — G40.309 NONINTRACTABLE GENERALIZED IDIOPATHIC EPILEPSY WITHOUT STATUS EPILEPTICUS (HCC): Chronic | ICD-10-CM

## 2018-11-30 PROBLEM — R35.0 URINARY FREQUENCY: Status: RESOLVED | Noted: 2018-09-28 | Resolved: 2018-11-30

## 2018-11-30 PROCEDURE — 99309 SBSQ NF CARE MODERATE MDM 30: CPT | Performed by: NURSE PRACTITIONER

## 2018-11-30 NOTE — PROGRESS NOTES
Nursing Home Follow Up Note      Luigi Urena DO []   [facility] [x]  [address] [place] [address]  Phone: [phone]  Fax: [phone] Marcell Christina MD []    Rm Tran DO []   [address] [place] [address]  Phone: [phone]  Fax: [phone]     PATIENT NAME: Viviane Peñaloza                                                                          YOB: 1955           DATE OF SERVICE: 11/30/2018  FACILITY:  [][facility]   [] [place]   [x] [facility]   [] [facility]   [] Other ______________________________________________________________________      CHIEF COMPLAINT:  CMM & LTC; f/u epileptic episode this am      HISTORY OF PRESENT ILLNESS:   Routine visit for coordination of long term care needs and chronic conditions. Patient had seizure this am, that lasted only a few minutes. She had not injuries, was laid in bed, she was not post ictal. She is doing well, up in wheelchair, writing out [place] cards.     PAST MEDICAL & SURGICAL HISTORY:   Past Medical History:   Diagnosis Date   • Epilepsy (CMS/HCC)    • Fracture, clavicle     left    • Shoulder fracture, left       Past Surgical History:   Procedure Laterality Date   • OOPHORECTOMY           MEDICATIONS:  I have reviewed and reconciled the patients medication list in the patients chart at the skilled nursing facility today.      ALLERGIES:  No Known Allergies      SOCIAL HISTORY:  Social History     Socioeconomic History   • Marital status: Single     Spouse name: Not on file   • Number of children: Not on file   • Years of education: Not on file   • Highest education level: Not on file   Social Needs   • Financial resource strain: Not on file   • Food insecurity - worry: Not on file   • Food insecurity - inability: Not on file   • Transportation needs - medical: Not on file   • Transportation needs - non-medical: Not on file   Occupational History   • Not on file   Tobacco Use   • Smoking status:  Never Smoker   • Smokeless tobacco: Never Used   Substance and Sexual Activity   • Alcohol use: No   • Drug use: No   • Sexual activity: Defer   Other Topics Concern   • Not on file   Social History Narrative   • Not on file       FAMILY HISTORY:  Family History   Problem Relation Age of Onset   • Diabetes Mother    • Heart disease Mother    • Hypertension Mother    • Hypertension Father    • Diabetes Sister    • Hypertension Sister        REVIEW OF SYSTEMS:  Review of Systems   Constitutional: Negative for activity change, appetite change, chills, diaphoresis, fatigue, fever, unexpected weight gain and unexpected weight loss.   HENT: Negative for congestion, ear pain, mouth sores, nosebleeds, postnasal drip, rhinorrhea, sinus pressure, sneezing, sore throat and trouble swallowing.    Eyes: Negative for blurred vision, pain, discharge, redness, itching and visual disturbance.   Respiratory: Negative for apnea, cough, choking, chest tightness, shortness of breath, wheezing and stridor.    Cardiovascular: Negative for chest pain, palpitations and leg swelling.   Gastrointestinal: Negative for abdominal distention, abdominal pain, blood in stool, constipation, diarrhea, nausea, vomiting, GERD and indigestion.   Endocrine: Negative for polydipsia and polyuria.   Genitourinary: Positive for frequency. Negative for urinary incontinence, decreased urine volume, difficulty urinating, dysuria, flank pain, hematuria and urgency.   Musculoskeletal: Positive for arthralgias. Negative for back pain, gait problem, joint swelling and myalgias.   Skin: Negative for color change, dry skin, rash and skin lesions.   Allergic/Immunologic: Negative for environmental allergies.   Neurological: Positive for seizures and memory problem. Negative for dizziness, speech difficulty, weakness and confusion.   Psychiatric/Behavioral: Negative for behavioral problems, dysphoric mood, hallucinations, sleep disturbance and depressed mood. The  patient is not nervous/anxious.          PHYSICAL EXAMINATION:   VITAL SIGNS: /76, HR 64 bpm, RR 20, 98% RA, Temp 97.3, weight 199    Physical Exam   Constitutional: She appears well-developed and well-nourished.   HENT:   Head: Normocephalic.   Right Ear: External ear normal.   Left Ear: External ear normal.   Nose: Nose normal.   Eyes: Conjunctivae are normal.   Neck: Normal range of motion.   Cardiovascular: Normal rate, regular rhythm, normal heart sounds and intact distal pulses.   Pulmonary/Chest: Effort normal and breath sounds normal. No respiratory distress. She has no wheezes. She has no rales.   Abdominal: Soft. Bowel sounds are normal. She exhibits no distension and no mass. There is no tenderness. No hernia.   Musculoskeletal: She exhibits edema (Bilateral lower extremities).   NROM all major joints   Neurological: She is alert.   Skin: Skin is warm and dry. No rash noted.   Psychiatric: She has a normal mood and affect. Her behavior is normal.   Nursing note and vitals reviewed.      RECORDS REVIEW:   I have reviewed and interpreted the following lab test results  obtained at the time of the visit today. See below.     ASSESSMENT     Diagnoses and all orders for this visit:    Nonintractable generalized idiopathic epilepsy without status epilepticus (CMS/HCC)    Impaired mobility and ADLs    Osteoporosis without current pathological fracture, unspecified osteoporosis type        PLAN  Patient had witnessed seizure this morning. She is doing very well, sitting in WC filling out [place] cards.  Keppra level 17 on 08/24/18 and phenobarbital level 21 on 07/02/18.  Orders for levels to be drawn Monday. She follows Neurology.    Continue supportive care as needed for mobilization and any assistance with ADLs.       Taking Fosamax as directed,for her Osteoporosis, tolerating it well.     [x]  Discussed Patient in detail with nursing/staff, addressed all needs today.     [x]  Plan of Care Reviewed   []   PT/OT Reviewed   []  Order Changes  []  Discharge Plans Reviewed  [x]  Advance Directive on file with Nursing Home.   [x]  POA on file with Nursing Home.   []  Code Status listed: [x]  Full Code   []  DNR         Marlene Garcia, APRN.

## 2018-12-05 NOTE — PROGRESS NOTES
Nursing Home Progress Note      Patient Name: Viviane Peñaloza   YOB: 1955    Date of Service: 9/12/2018      Facility: [facility] []   [place] []   [facility] [x]   [facility] []   Other []       CHIEF COMPLAINT:  CMM/LTC     HISTORY OF PRESENT ILLNESS:  [x]  Follow Up visit for coordination of chronic medical management needs and issues.    Epilepsy/impaired mobility and ADLs/post displaced fracture of the medial condyle of the left tibia with routine healing    PAST MEDICAL & SURGICAL HISTORY:  Past Medical History:   Diagnosis Date   • Epilepsy (CMS/HCC)    • Fracture, clavicle     left    • Shoulder fracture, left       Past Surgical History:   Procedure Laterality Date   • OOPHORECTOMY          MEDICATIONS:  Medication administration record for Viviane Peñaloza reviewed and reconciled today.    ALLERGIES:  No Known Allergies     REVIEW OF SYSTEMS:  • Appetite: Fair []   Good [x]   Poor []   Weight Loss []  [x]  Weight Stable   Unavoidable Weight Loss []  Tolerating Tube Feeding []    Supplements Provided []   • Constitutional: Negative for fever, chills, diaphoresis or fatigue and weight change.   • HENT: No dysphagia; no changes to vision/hearing/smell/taste; no epistaxis  • Eyes: Negative for redness and visual disturbance.   • Respiratory: Negative for shortness of breath, chest pain, cough or chest tightness.   • Cardiovascular: Negative for chest pain and palpitations.   • Gastrointestinal: Negative for abdominal distention, abdominal pain and blood in stool.   • Endocrine: Negative for cold intolerance and heat intolerance.   • Genitourinary: Negative for difficulty urinating, dysuria and frequency.Negative for hematuria   • Musculoskeletal: Chronic myalgias and arthralgias  • Integumentary: No open wounds, rash or concerning skin lesions  • Neurological: Negative for syncope, weakness and headaches.  No seizure-like activity reported.  • Hematological: Negative for adenopathy. Does not bruise/bleed  easily.   • Immunological: Negative for reported allergies or immunological disorders  • Psychological: No depression, anxiety or suicidal ideation    PHYSICAL EXAMINATION:  VITAL SIGNS: /74, HR 64 BPM, RR 18, weight 201    General Appearance:  [x]  Alert   [x]  Oriented x 3  [x]  No acute distress    []  Confused  []  Disoriented   []  Comatose   Head:  Atraumatic and normocephalic, without obvious abnormality.   Eyes:          PERRLA, conjunctivae and sclerae normal, no Icterus. No pallor. Extra-occular movements are within normal limits.   Ears:  Ears appear intact with no abnormalities noted.   Throat: No oral lesions, no thrush, oral mucosa moist.   Neck: Supple, trachea midline, no thyromegaly, no carotid bruit.   Back:   No kyphoscoliosis. No tenderness to palpation.   Lungs:   Chest shape is normal.  Audible air exchange noted all lung fields.  No wheezing.    Heart:  Normal S1 and S2, no murmur, no gallop, no rub. No JVD.   Abdomen:   Normal bowel sounds, no masses, no organomegaly. Soft, non-tender, non-distended, no guarding    Extremities: Moves all extremities, edema, cyanosis or clubbing.  Generalized weakness noted to the left lower extremity.      Pulses: Pulses palpable and equal bilaterally.   Skin: No bleeding or rash.  Generalized dry skin noted.  Age-related atrophy of skin.   Neurologic: [x] Normal speech []  Normal mental status    [x] Cranial nerves II through XII intact   [x]  No anosmia [x]  DTR 2+ [x]  Proprioception intact  [x]  No focal motor/sensory deficits     Psych/Mood:        [x]  No acute changes []  Depressed  Urinary:        [x]  Continent  []  Incontinent  []  Retention  []  F/C     []  UTI w/treatment in progress  RECORD REVIEW:   • Consult notes []   • Admission and subsequent notes []   •  [x] I have personally reviewed and interpreted available lab data, radiology studies and ECG obtained at time of visit.  [x] Medication Review: I have reviewed the patients active  and prn medications at [facility]illed Nursing Facility     ASSESSMENT   Diagnoses and all orders for this visit:    Impaired mobility and ADLs    Nonintractable generalized idiopathic epilepsy without status epilepticus (CMS/HCC)        PLAN  Continue supportive care as needed for mobilization and any assistance with ADLs.  No reported seizure-like activity/episodes from staff since last visit.  Continue current medication regimen, adjustments to be made as needed.  Routine surveillance labs, adjustment to medication dosing as needed.  Plan to continue therapy as tolerated and as long as remains beneficial.  Appetite normal, hemodynamically stable, noted relative weight stability.    [x]  Discussed Patient in detail with nursing/staff, addressed all needs today.     [x]  Plan of Care Reviewed   []   PT/OT Reviewed   []  Order Changes  []   Discharge Plans Reviewed         Total face to face time spent with patient 25 minutes, 15 min in counseling.    Luigi Urena DO.  9/12/2018

## 2018-12-18 NOTE — PROGRESS NOTES
Nursing Home Progress Note      Patient Name: Viviane Peñaloza   YOB: 1955    Date of Service: 9/19/2018      Facility: [place] []   [place] []   [facility] [x]   [facility] []   Other []       CHIEF COMPLAINT:  CMM/LTC     HISTORY OF PRESENT ILLNESS:  [x]  Follow Up visit for coordination of chronic medical management needs and issues.    Epilepsy/impaired mobility and ADLs/post displaced fracture of the medial condyle of the left tibia with routine healing    PAST MEDICAL & SURGICAL HISTORY:  Past Medical History:   Diagnosis Date   • Epilepsy (CMS/HCC)    • Fracture, clavicle     left    • Shoulder fracture, left       Past Surgical History:   Procedure Laterality Date   • OOPHORECTOMY          MEDICATIONS:  Medication administration record for Viviane Peñaloza reviewed and reconciled today.    ALLERGIES:  No Known Allergies     REVIEW OF SYSTEMS:  • Appetite: Fair []   Good [x]   Poor []   Weight Loss []  [x]  Weight Stable   Unavoidable Weight Loss []  Tolerating Tube Feeding []    Supplements Provided []   • Constitutional: Negative for fever, chills, diaphoresis or fatigue and weight change.   • HENT: No dysphagia; no changes to vision/hearing/smell/taste; no epistaxis  • Eyes: Negative for redness and visual disturbance.   • Respiratory: Negative for shortness of breath, chest pain, cough or chest tightness.   • Cardiovascular: Negative for chest pain and palpitations.   • Gastrointestinal: Negative for abdominal distention, abdominal pain and blood in stool.   • Endocrine: Negative for cold intolerance and heat intolerance.   • Genitourinary: Negative for difficulty urinating, dysuria and frequency.Negative for hematuria   • Musculoskeletal: Chronic myalgias and arthralgias  • Integumentary: No open wounds, rash or concerning skin lesions  • Neurological: Negative for syncope, weakness and headaches.  No seizure-like activity reported.  • Hematological: Negative for adenopathy. Does not bruise/bleed  easily.   • Immunological: Negative for reported allergies or immunological disorders  • Psychological: No depression, anxiety or suicidal ideation    PHYSICAL EXAMINATION:  VITAL SIGNS: /67, HR 69 bpm, RR 18, weight 201    General Appearance:  [x]  Alert   [x]  Oriented x 3  [x]  No acute distress    []  Confused  []  Disoriented   []  Comatose   Head:  Atraumatic and normocephalic, without obvious abnormality.   Eyes:          PERRLA, conjunctivae and sclerae normal, no Icterus. No pallor. Extra-occular movements are within normal limits.   Ears:  Ears appear intact with no abnormalities noted.   Throat: No oral lesions, no thrush, oral mucosa moist.   Neck: Supple, trachea midline, no thyromegaly, no carotid bruit.   Back:   No kyphoscoliosis. No tenderness to palpation.   Lungs:   Chest shape is normal.  Audible air exchange noted all lung fields.  No wheezing.    Heart:  Normal S1 and S2, no murmur, no gallop, no rub. No JVD.   Abdomen:   Normal bowel sounds, no masses, no organomegaly. Soft, non-tender, non-distended, no guarding    Extremities: Moves all extremities, edema, cyanosis or clubbing.  Generalized weakness noted to the left lower extremity.      Pulses: Pulses palpable and equal bilaterally.   Skin: No bleeding or rash.  Generalized dry skin noted.  Age-related atrophy of skin.   Neurologic: [x] Normal speech []  Normal mental status    [x] Cranial nerves II through XII intact   [x]  No anosmia [x]  DTR 2+ [x]  Proprioception intact  [x]  No focal motor/sensory deficits     Psych/Mood:        [x]  No acute changes []  Depressed  Urinary:        [x]  Continent  []  Incontinent  []  Retention  []  F/C     []  UTI w/treatment in progress  RECORD REVIEW:   • Consult notes []   • Admission and subsequent notes []   •  [x] I have personally reviewed and interpreted available lab data, radiology studies and ECG obtained at time of visit.  [x] Medication Review: I have reviewed the patients active  and prn medications at Skilled Nursing Facility     ASSESSMENT   Diagnoses and all orders for this visit:    Impaired mobility and ADLs    Nonintractable generalized idiopathic epilepsy without status epilepticus (CMS/HCC)    Osteoporosis without current pathological fracture, unspecified osteoporosis type        PLAN  Continue supportive care as needed for mobilization and any assistance with ADLs.  No reported seizure-like activity/episodes from staff.  Continue current medication regimen, adjustments will be made as needed.  Routine surveillance labs, adjustment to medication dosing when needed.  Plan to continue therapy as tolerated and as long as remains beneficial.  Appetite normal, hemodynamically stable, noted relative weight stability.    [x]  Discussed Patient in detail with nursing/staff, addressed all needs today.     [x]  Plan of Care Reviewed   []   PT/OT Reviewed   []  Order Changes  []   Discharge Plans Reviewed         Total face to face time spent with patient 25 minutes, 15 min in counseling.    Luigi Urena DO.  9/19/2018

## 2018-12-19 ENCOUNTER — NURSING HOME (OUTPATIENT)
Dept: FAMILY MEDICINE CLINIC | Facility: CLINIC | Age: 63
End: 2018-12-19

## 2018-12-19 DIAGNOSIS — E55.9 VITAMIN D DEFICIENCY: ICD-10-CM

## 2018-12-19 DIAGNOSIS — S62.601A FRACTURE OF UNSPECIFIED PHALANX OF LEFT INDEX FINGER, INITIAL ENCOUNTER FOR CLOSED FRACTURE: ICD-10-CM

## 2018-12-19 DIAGNOSIS — Z78.9 IMPAIRED MOBILITY AND ADLS: Primary | Chronic | ICD-10-CM

## 2018-12-19 DIAGNOSIS — G40.309 NONINTRACTABLE GENERALIZED IDIOPATHIC EPILEPSY WITHOUT STATUS EPILEPTICUS (HCC): Chronic | ICD-10-CM

## 2018-12-19 DIAGNOSIS — S82.132D: ICD-10-CM

## 2018-12-19 DIAGNOSIS — Z74.09 IMPAIRED MOBILITY AND ADLS: Primary | Chronic | ICD-10-CM

## 2018-12-19 PROCEDURE — 99309 SBSQ NF CARE MODERATE MDM 30: CPT | Performed by: FAMILY MEDICINE

## 2018-12-19 NOTE — PROGRESS NOTES
Nursing Home Progress Note      Patient Name: Viviane Peñaloza   YOB: 1955    Date of Service: 9/26/18    Facility: [facility] []   [facility] []   [facility] [x]   [facility] []   Other []       CHIEF COMPLAINT:  CMM/LTC     HISTORY OF PRESENT ILLNESS:  [x]  Follow Up visit for coordination of chronic medical management needs and issues.    Epilepsy/impaired mobility and ADLs/post displaced fracture of the medial condyle of the left tibia with routine healing    PAST MEDICAL & SURGICAL HISTORY:  Past Medical History:   Diagnosis Date   • Epilepsy (CMS/HCC)    • Fracture, clavicle     left    • Shoulder fracture, left       Past Surgical History:   Procedure Laterality Date   • OOPHORECTOMY          MEDICATIONS:  Medication administration record for Viviane Peñaloza reviewed and reconciled today.    ALLERGIES:  No Known Allergies     REVIEW OF SYSTEMS:  • Appetite: Fair []   Good [x]   Poor []   Weight Loss []  [x]  Weight Stable   Unavoidable Weight Loss []  Tolerating Tube Feeding []    Supplements Provided []   • Constitutional: Negative for fever, chills, diaphoresis or fatigue and weight change.   • HENT: No dysphagia; no changes to vision/hearing/smell/taste; no epistaxis  • Eyes: Negative for redness and visual disturbance.   • Respiratory: Negative for shortness of breath, chest pain, cough or chest tightness.   • Cardiovascular: Negative for chest pain and palpitations.   • Gastrointestinal: Negative for abdominal distention, abdominal pain and blood in stool.   • Endocrine: Negative for cold intolerance and heat intolerance.   • Genitourinary: Negative for difficulty urinating, dysuria and frequency.Negative for hematuria   • Musculoskeletal: Chronic myalgias and arthralgias  • Integumentary: No open wounds, rash or concerning skin lesions  • Neurological: Negative for syncope, weakness and headaches.  No seizure-like activity reported.  • Hematological: Negative for adenopathy. Does not bruise/bleed easily.    • Immunological: Negative for reported allergies or immunological disorders  • Psychological: No depression, anxiety or suicidal ideation    PHYSICAL EXAMINATION:  VITAL SIGNS: /72, HR 74 bpm, RR 20, weight 201, O2 97%, TEMP 97.6    General Appearance:  [x]  Alert   [x]  Oriented x 3  [x]  No acute distress    []  Confused  []  Disoriented   []  Comatose   Head:  Atraumatic and normocephalic, without obvious abnormality.   Eyes:          PERRLA, conjunctivae and sclerae normal, no Icterus. No pallor. Extra-occular movements are within normal limits.   Ears:  Ears appear intact with no abnormalities noted.   Throat: No oral lesions, no thrush, oral mucosa moist.   Neck: Supple, trachea midline, no thyromegaly, no carotid bruit.   Back:   No kyphoscoliosis. No tenderness to palpation.   Lungs:   Chest shape is normal.  Audible air exchange noted all lung fields.  No wheezing.    Heart:  Normal S1 and S2, no murmur, no gallop, no rub. No JVD.   Abdomen:   Normal bowel sounds, no masses, no organomegaly. Soft, non-tender, non-distended, no guarding    Extremities: Moves all extremities, edema, cyanosis or clubbing.  Generalized weakness noted to the left lower extremity.      Pulses: Pulses palpable and equal bilaterally.   Skin: No bleeding or rash.  Generalized dry skin noted.  Age-related atrophy of skin.   Neurologic: [x] Normal speech []  Normal mental status    [x] Cranial nerves II through XII intact   [x]  No anosmia [x]  DTR 2+ [x]  Proprioception intact  [x]  No focal motor/sensory deficits     Psych/Mood:        [x]  No acute changes []  Depressed  Urinary:        [x]  Continent  []  Incontinent  []  Retention  []  F/C     []  UTI w/treatment in progress  RECORD REVIEW:   • Consult notes []   • Admission and subsequent notes []   •  [x] I have personally reviewed and interpreted available lab data, radiology studies and ECG obtained at time of visit.  [x] Medication Review: I have reviewed the  patients [facility]     ASSESSMENT   Diagnoses and all orders for this visit:    Vitamin D deficiency    Osteoporosis without current pathological fracture, unspecified osteoporosis type    Nonintractable generalized idiopathic epilepsy without status epilepticus (CMS/HCC)    Impaired mobility and ADLs    Closed displaced fracture of medial condyle of left tibia with routine healing        PLAN  Continue supportive care as needed for mobilization and any assistance with ADLs.  No reported seizure-like activity/episodes from staff.  Continue current medication regimen, adjustments will be made as needed.  Routine surveillance labs, adjustment to medication dosing when needed.  Plan to continue therapy as tolerated and as long as remains beneficial.  Appetite normal, hemodynamically stable on review of vital signs, noted weight stability without change.    [x]  Discussed Patient in detail with nursing/staff, addressed all needs today.     [x]  Plan of Care Reviewed   []   PT/OT Reviewed   []  Order Changes  []   Discharge Plans Reviewed         Total face to face time spent with patient 25 minutes, 15 min in counseling.      9/26/18

## 2018-12-27 NOTE — PROGRESS NOTES
Nursing Home Progress Note      Patient Name: Viviane Peñaloza   YOB: 1955    Date of Service: 10/03/18    Facility: [facility] []   [facility] []   [facility] [x]   [facility] []   Other []       CHIEF COMPLAINT:  CMM/LTC     HISTORY OF PRESENT ILLNESS:  [x]  Follow Up visit for coordination of chronic medical management needs and issues.    Epilepsy/impaired mobility and ADLs/post displaced fracture of the medial condyle of the left tibia with routine healing    PAST MEDICAL & SURGICAL HISTORY:  Past Medical History:   Diagnosis Date   • Epilepsy (CMS/HCC)    • Fracture, clavicle     left    • Shoulder fracture, left       Past Surgical History:   Procedure Laterality Date   • OOPHORECTOMY          MEDICATIONS:  Medication administration record for Viviane Peñaloza reviewed and reconciled today.    ALLERGIES:  No Known Allergies     REVIEW OF SYSTEMS:  • Appetite: Fair []   Good [x]   Poor []   Weight Loss []  [x]  Weight Stable   Unavoidable Weight Loss []  Tolerating Tube Feeding []    Supplements Provided []   • Constitutional: Negative for fever, chills, diaphoresis or fatigue and weight change.   • HENT: No dysphagia; no changes to vision/hearing/smell/taste; no epistaxis  • Eyes: Negative for redness and visual disturbance.   • Respiratory: Negative for shortness of breath, chest pain, cough or chest tightness.   • Cardiovascular: Negative for chest pain and palpitations.   • Gastrointestinal: Negative for abdominal distention, abdominal pain and blood in stool.   • Endocrine: Negative for cold intolerance and heat intolerance.   • Genitourinary: Negative for difficulty urinating, dysuria and frequency.Negative for hematuria   • Musculoskeletal: Chronic myalgias and arthralgias  • Integumentary: No open wounds, rash or concerning skin lesions  • Neurological: Negative for syncope, weakness and headaches.  No seizure-like activity reported.  • Hematological: Negative for adenopathy. Does not bruise/bleed easily.    • Immunological: Negative for reported allergies or immunological disorders  • Psychological: No depression, anxiety or suicidal ideation    PHYSICAL EXAMINATION:  VITAL SIGNS: /80  HR 64  02 96%  RR 18  TEMP 97.9  WEIGHT 204    General Appearance:  [x]  Alert   [x]  Oriented x 3  [x]  No acute distress    []  Confused  []  Disoriented   []  Comatose   Head:  Atraumatic and normocephalic, without obvious abnormality.   Eyes:          PERRLA, conjunctivae and sclerae normal, no Icterus. No pallor. Extra-occular movements are within normal limits.   Ears:  Ears appear intact with no abnormalities noted.   Throat: No oral lesions, no thrush, oral mucosa moist.   Neck: Supple, trachea midline, no thyromegaly, no carotid bruit.   Back:   No kyphoscoliosis. No tenderness to palpation.   Lungs:   Chest shape is normal.  Audible air exchange noted all lung fields.  No wheezing.    Heart:  Normal S1 and S2, no murmur, no gallop, no rub. No JVD.   Abdomen:   Normal bowel sounds, no masses, no organomegaly. Soft, non-tender, non-distended, no guarding    Extremities: Moves all extremities, edema, cyanosis or clubbing.  Generalized weakness noted to the left lower extremity.      Pulses: Pulses palpable and equal bilaterally.   Skin: No bleeding or rash.  Generalized dry skin noted.  Age-related atrophy of skin.   Neurologic: [x] Normal speech []  Normal mental status    [x] Cranial nerves II through XII intact   [x]  No anosmia [x]  DTR 2+ [x]  Proprioception intact  [x]  No focal motor/sensory deficits     Psych/Mood:        [x]  No acute changes []  Depressed  Urinary:        [x]  Continent  []  Incontinent  []  Retention  []  F/C     []  UTI w/treatment in progress  RECORD REVIEW:   • Consult notes []   • Admission and subsequent notes []   •  [x] I have personally reviewed and interpreted available lab data, radiology studies and ECG obtained at time of visit.  [x] Medication Review: I have reviewed the patients  active and prn medications [facility]     ASSESSMENT   Diagnoses and all orders for this visit:    Impaired mobility and ADLs    Nonintractable generalized idiopathic epilepsy without status epilepticus (CMS/HCC)    Vitamin D deficiency    Osteoporosis without current pathological fracture, unspecified osteoporosis type        PLAN  Continue supportive care as needed for mobilization and any assistance with ADLs.  No reported seizure-like activity/episodes from staff since last visit.  Continue current medication regimen, adjustments will be made as needed.  Routine surveillance labs, adjustment to medication dosing when needed.  Plan to continue current therapy as tolerated and as long as remains beneficial to patient.  Appetite normal, hemodynamically stable on review of vital signs, noted weight stability 3 pound gain.    [x]  Discussed Patient in detail with nursing/staff, addressed all needs today.     [x]  Plan of Care Reviewed   []   PT/OT Reviewed   []  Order Changes  []   Discharge Plans Reviewed         Total face to face time spent with patient 25 minutes, 15 min in counseling.      10/03/18

## 2019-01-02 NOTE — PROGRESS NOTES
Nursing Home Progress Note      Patient Name: Viviane Peñaloza   YOB: 1955    Date of Service: 10/24/2018    Luigi Urena, DO [x]  [facility] ?  [address] [place] [address]  Phone: [phone]  Fax: [phone] Marcell Christina MD ?  Rm Tran, DO ?  [address] [place] [address]  Phone: [phone]  Fax: [phone]       Facility: [facility] []   [facility] []   [facility] [x]   [facility] []   Other []       CHIEF COMPLAINT:  CMM/LTC     HISTORY OF PRESENT ILLNESS:  [x]  Follow Up visit for coordination of chronic medical management needs and issues.    Epilepsy/impaired mobility and ADLs/post displaced fracture of the medial condyle of the left tibia with routine healing    PAST MEDICAL & SURGICAL HISTORY:  Past Medical History:   Diagnosis Date   • Epilepsy (CMS/HCC)    • Fracture, clavicle     left    • Shoulder fracture, left       Past Surgical History:   Procedure Laterality Date   • OOPHORECTOMY          MEDICATIONS:  Medication administration record for Viviane Peñaloza reviewed and reconciled today.    ALLERGIES:  No Known Allergies     REVIEW OF SYSTEMS:  • Appetite: Fair []   Good [x]   Poor []   Weight Loss []  [x]  Weight Stable   Unavoidable Weight Loss []  Tolerating Tube Feeding []    Supplements Provided []   • Constitutional: Negative for fever, chills, diaphoresis or fatigue and weight change.   • HENT: No dysphagia; no changes to vision/hearing/smell/taste; no epistaxis  • Eyes: Negative for redness and visual disturbance.   • Respiratory: Negative for shortness of breath, chest pain, cough or chest tightness.   • Cardiovascular: Negative for chest pain and palpitations.   • Gastrointestinal: Negative for abdominal distention, abdominal pain and blood in stool.   • Endocrine: Negative for cold intolerance and heat intolerance.   • Genitourinary: Negative for difficulty urinating, dysuria and frequency.Negative for hematuria    • Musculoskeletal: Chronic myalgias and arthralgias  • Integumentary: No open wounds, rash or concerning skin lesions  • Neurological: Negative for syncope, weakness and headaches.  No seizure-like activity reported.  • Hematological: Negative for adenopathy. Does not bruise/bleed easily.   • Immunological: Negative for reported allergies or immunological disorders  • Psychological: No depression, anxiety or suicidal ideation    PHYSICAL EXAMINATION:  VITAL SIGNS: /59  HR 86  02 96%  RR 18  TEMP 97.3  WEIGHT 198    General Appearance:  [x]  Alert   [x]  Oriented x 3  [x]  No acute distress    []  Confused  []  Disoriented   []  Comatose   Head:  Atraumatic and normocephalic, without obvious abnormality.   Eyes:          PERRLA, conjunctivae and sclerae normal, no Icterus. No pallor. Extra-occular movements are within normal limits.   Ears:  Ears appear intact with no abnormalities noted.   Throat: No oral lesions, no thrush, oral mucosa moist.   Neck: Supple, trachea midline, no thyromegaly, no carotid bruit.   Back:   No kyphoscoliosis. No tenderness to palpation.   Lungs:   Chest shape is normal.  Audible air exchange noted all lung fields.  No wheezing.    Heart:  Normal S1 and S2, no murmur, no gallop, no rub. No JVD.   Abdomen:   Normal bowel sounds, no masses, no organomegaly. Soft, non-tender, non-distended, no guarding    Extremities: Moves all extremities, edema, cyanosis or clubbing.  Generalized weakness noted to the left lower extremity.      Pulses: Pulses palpable and equal bilaterally.   Skin: No bleeding or rash.  Generalized dry skin noted.  Age-related atrophy of skin.   Neurologic: [x] Normal speech []  Normal mental status    [x] Cranial nerves II through XII intact   [x]  No anosmia [x]  DTR 2+ [x]  Proprioception intact  [x]  No focal motor/sensory deficits     Psych/Mood:        [x]  No acute changes []  Depressed  Urinary:        [x]  Continent  []  Incontinent  []  Retention  []  F/C      []  UTI w/treatment in progress  RECORD REVIEW:   • Consult notes []   • Admission and subsequent notes []   •  [x] I have personally reviewed and interpreted available lab data, radiology studies and ECG obtained at time of visit.  [x] Medication Review: I have reviewed the patients active and prn medications at [facility]     ASSESSMENT   Diagnoses and all orders for this visit:    Vitamin D deficiency    Osteoporosis without current pathological fracture, unspecified osteoporosis type    Nonintractable generalized idiopathic epilepsy without status epilepticus (CMS/HCC)    Impaired mobility and ADLs    Closed displaced fracture of medial condyle of left tibia with routine healing        PLAN  Continue supportive care as needed for mobilization and any assistance with ADLs.  No reported seizure-like activity/episodes from staff since last visit.  Continue current medication regimen, adjustments will be made as needed.  Routine surveillance labs, adjustment to medication dosing when needed.  Plan to continue current therapy as tolerated and as long as remains beneficial to patient.  Appetite normal, hemodynamically stable on review of vital signs, noted weight loss of approximately 6 pounds, does not reflect her clinical state however.    [x]  Discussed Patient in detail with nursing/staff, addressed all needs today.     [x]  Plan of Care Reviewed   []   PT/OT Reviewed   []  Order Changes  []   Discharge Plans Reviewed         Total face to face time spent with patient 25 minutes, 15 min in counseling.    Luigi Urena  10/24/2018

## 2019-01-02 NOTE — PROGRESS NOTES
Nursing Home Progress Note      Patient Name: Viviane Peñaloza   YOB: 1955    Date of Service: 10/17/2018    Facility: [facility] []   [facility] []   [facility] [x]   [facility] []   Other []         Luigi Urena DO [x]  [facility], [place] ?  [address] [place] [address]  Phone: [phone]  Fax: [phone] Marcell Christina MD ?  Rm Tran DO ?  [address] [place] [address]  Phone: [phone]  Fax: [phone]       CHIEF COMPLAINT:  CMM/LTC     HISTORY OF PRESENT ILLNESS:  [x]  Follow Up visit for coordination of chronic medical management needs and issues.    Epilepsy/impaired mobility and ADLs/post displaced fracture of the medial condyle of the left tibia with routine healing.    Patient did unfortunately suffer seizure-like activity which resulted in a fracture to the second metacarpal of her left hand, status post orthopedic intervention for stabilization and fixation.    PAST MEDICAL & SURGICAL HISTORY:  Past Medical History:   Diagnosis Date   • Epilepsy (CMS/HCC)    • Fracture, clavicle     left    • Shoulder fracture, left       Past Surgical History:   Procedure Laterality Date   • OOPHORECTOMY          MEDICATIONS:  Medication administration record for Viviane Peñaloza reviewed and reconciled today.    ALLERGIES:  No Known Allergies     REVIEW OF SYSTEMS:  • Appetite: Fair []   Good [x]   Poor []   Weight Loss []  [x]  Weight Stable   Unavoidable Weight Loss []  Tolerating Tube Feeding []    Supplements Provided []   • Constitutional: Negative for fever, chills, diaphoresis or fatigue and weight change.   • HENT: No dysphagia; no changes to vision/hearing/smell/taste; no epistaxis  • Eyes: Negative for redness and visual disturbance.   • Respiratory: Negative for shortness of breath, chest pain, cough or chest tightness.   • Cardiovascular: Negative for chest pain and palpitations.   • Gastrointestinal: Negative for abdominal distention, abdominal pain and  blood in stool.   • Endocrine: Negative for cold intolerance and heat intolerance.   • Genitourinary: Negative for difficulty urinating, dysuria and frequency.Negative for hematuria   • Musculoskeletal: Chronic myalgias and arthralgias  • Integumentary: No open wounds, rash or concerning skin lesions  • Neurological: Negative for syncope, weakness and headaches.  No seizure-like activity reported.  • Hematological: Negative for adenopathy. Does not bruise/bleed easily.   • Immunological: Negative for reported allergies or immunological disorders  • Psychological: No depression, anxiety or suicidal ideation    PHYSICAL EXAMINATION:  VITAL SIGNS: /80  HR 64  02 96%  RR 18  TEMP 97.9  WEIGHT 204    General Appearance:  [x]  Alert   [x]  Oriented x 3  [x]  No acute distress    []  Confused  []  Disoriented   []  Comatose   Head:  Atraumatic and normocephalic, without obvious abnormality.   Eyes:          PERRLA, conjunctivae and sclerae normal, no Icterus. No pallor. Extra-occular movements are within normal limits.   Ears:  Ears appear intact with no abnormalities noted.   Throat: No oral lesions, no thrush, oral mucosa moist.   Neck: Supple, trachea midline, no thyromegaly, no carotid bruit.   Back:   No kyphoscoliosis. No tenderness to palpation.   Lungs:   Chest shape is normal.  Audible air exchange noted all lung fields.  No wheezing.    Heart:  Normal S1 and S2, no murmur, no gallop, no rub. No JVD.   Abdomen:   Normal bowel sounds, no masses, no organomegaly. Soft, non-tender, non-distended, no guarding    Extremities: Moves all extremities, edema, cyanosis or clubbing.  Generalized weakness noted to the left lower extremity.      Pulses: Pulses palpable and equal bilaterally. left hand immobilized with splinting, pin placement for fracture stabilization.  Sensation intact ×3, radial pulse 2+ and capillary refill time normal.     Skin: No bleeding or rash.  Generalized dry skin noted.  Age-related atrophy  of skin.   Neurologic: [x] Normal speech []  Normal mental status    [x] Cranial nerves II through XII intact   [x]  No anosmia [x]  DTR 2+ [x]  Proprioception intact  [x]  No focal motor/sensory deficits     Psych/Mood:        [x]  No acute changes []  Depressed  Urinary:        [x]  Continent  []  Incontinent  []  Retention  []  F/C     []  UTI w/treatment in progress  RECORD REVIEW:   • Consult notes []   • Admission and subsequent notes []   •  [x] I have personally reviewed and interpreted available lab data, radiology studies and ECG obtained at time of visit.  [x] Medication Review: I have reviewed the patients active and prn medications at [facility]     ASSESSMENT   Diagnoses and all orders for this visit:    Vitamin D deficiency    Osteoporosis without current pathological fracture, unspecified osteoporosis type    Nonintractable generalized idiopathic epilepsy without status epilepticus (CMS/HCC)    Impaired mobility and ADLs    Closed displaced fracture of medial condyle of left tibia with routine healing    Closed displaced fracture of first metacarpal bone of left hand, unspecified portion of metacarpal, initial encounter        PLAN  Continue supportive care as needed for mobilization and any assistance with ADLs.  Seizure-like activity as per above with resultant left hand injury.  Continue surveillance labs, adjustment to medication dosing as needed.  Plan to continue current therapy as tolerated and as long as remains beneficial to patient.  Appetite normal, hemodynamically stable, noted weight stability without change.  Patient has been seen by orthopedic surgery and is currently status post fixation of displaced metacarpal fracture.  Left hand splinted and pinning appears appropriate at this time.  Continue scheduled follow-up appointment.    [x]  Discussed Patient in detail with nursing/staff, addressed all needs today.     [x]  Plan of Care Reviewed   []   PT/OT Reviewed   []   Order Changes  []   Discharge Plans Reviewed         Total face to face time spent with patient 25 minutes, 15 min in counseling.    Luigi Urena  10/17/2018

## 2019-01-16 PROBLEM — S62.601A: Status: ACTIVE | Noted: 2019-01-16

## 2019-01-18 ENCOUNTER — NURSING HOME (OUTPATIENT)
Dept: FAMILY MEDICINE CLINIC | Facility: CLINIC | Age: 64
End: 2019-01-18

## 2019-01-18 DIAGNOSIS — M81.0 OSTEOPOROSIS WITHOUT CURRENT PATHOLOGICAL FRACTURE, UNSPECIFIED OSTEOPOROSIS TYPE: ICD-10-CM

## 2019-01-18 DIAGNOSIS — Z78.9 IMPAIRED MOBILITY AND ADLS: Chronic | ICD-10-CM

## 2019-01-18 DIAGNOSIS — Z74.09 IMPAIRED MOBILITY AND ADLS: Chronic | ICD-10-CM

## 2019-01-18 DIAGNOSIS — G40.309 NONINTRACTABLE GENERALIZED IDIOPATHIC EPILEPSY WITHOUT STATUS EPILEPTICUS (HCC): Chronic | ICD-10-CM

## 2019-01-18 PROCEDURE — 99308 SBSQ NF CARE LOW MDM 20: CPT | Performed by: NURSE PRACTITIONER

## 2019-01-20 NOTE — PROGRESS NOTES
Nursing Home Follow Up Note      Luigi Urena DO []   LATONIA Tony [x]  [address] [place] [address]  Phone: [phone]  Fax: [phone] Marcell Christina MD []    Rm Tran DO []   [address] [place] [address]  Phone: [phone]  Fax: [phone]     PATIENT NAME: Viviane Peñaloza                                                                          YOB: 1955           DATE OF SERVICE: 11/30/2018  FACILITY:  [][place]   [] [place]   [x] [facility]   [] [facility]   [] Other ______________________________________________________________________      CHIEF COMPLAINT:  CMM & LTC      HISTORY OF PRESENT ILLNESS:   Routine visit for coordination of long term care needs and chronic conditions.     PAST MEDICAL & SURGICAL HISTORY:   Past Medical History:   Diagnosis Date   • Epilepsy (CMS/HCC)    • Fracture, clavicle     left    • Shoulder fracture, left       Past Surgical History:   Procedure Laterality Date   • OOPHORECTOMY           MEDICATIONS:  I have reviewed and reconciled the patients medication list in the patients chart at the skilled nursing facility today.      ALLERGIES:  No Known Allergies      SOCIAL HISTORY:  Social History     Socioeconomic History   • Marital status: Single     Spouse name: Not on file   • Number of children: Not on file   • Years of education: Not on file   • Highest education level: Not on file   Social Needs   • Financial resource strain: Not on file   • Food insecurity - worry: Not on file   • Food insecurity - inability: Not on file   • Transportation needs - medical: Not on file   • Transportation needs - non-medical: Not on file   Occupational History   • Not on file   Tobacco Use   • Smoking status: Never Smoker   • Smokeless tobacco: Never Used   Substance and Sexual Activity   • Alcohol use: No   • Drug use: No   • Sexual activity: Defer   Other Topics Concern   • Not on file   Social History Narrative   • Not on  file       FAMILY HISTORY:  Family History   Problem Relation Age of Onset   • Diabetes Mother    • Heart disease Mother    • Hypertension Mother    • Hypertension Father    • Diabetes Sister    • Hypertension Sister        REVIEW OF SYSTEMS:  Review of Systems   Constitutional: Negative for activity change, appetite change, chills, diaphoresis, fatigue, fever, unexpected weight gain and unexpected weight loss.   HENT: Negative for congestion, ear pain, mouth sores, nosebleeds, postnasal drip, rhinorrhea, sinus pressure, sneezing, sore throat and trouble swallowing.    Eyes: Negative for blurred vision, pain, discharge, redness, itching and visual disturbance.   Respiratory: Negative for apnea, cough, choking, chest tightness, shortness of breath, wheezing and stridor.    Cardiovascular: Negative for chest pain, palpitations and leg swelling.   Gastrointestinal: Negative for abdominal distention, abdominal pain, blood in stool, constipation, diarrhea, nausea, vomiting, GERD and indigestion.   Endocrine: Negative for polydipsia and polyuria.   Genitourinary: Positive for frequency. Negative for urinary incontinence, decreased urine volume, difficulty urinating, dysuria, flank pain, hematuria and urgency.   Musculoskeletal: Positive for arthralgias. Negative for back pain, gait problem, joint swelling and myalgias.   Skin: Negative for color change, dry skin, rash and skin lesions.   Allergic/Immunologic: Negative for environmental allergies.   Neurological: Positive for seizures and memory problem. Negative for dizziness, speech difficulty, weakness and confusion.   Psychiatric/Behavioral: Negative for behavioral problems, dysphoric mood, hallucinations, sleep disturbance and depressed mood. The patient is not nervous/anxious.          PHYSICAL EXAMINATION:   VITAL SIGNS: /76,   HR 74 bpm,   RR 20, 97%    RA, Temp 97.3,   weight 204    Physical Exam   Constitutional: She appears well-developed and  well-nourished.   HENT:   Head: Normocephalic.   Right Ear: External ear normal.   Left Ear: External ear normal.   Nose: Nose normal.   Eyes: Conjunctivae are normal.   Neck: Normal range of motion.   Cardiovascular: Normal rate, regular rhythm, normal heart sounds and intact distal pulses.   Pulmonary/Chest: Effort normal and breath sounds normal. No respiratory distress. She has no wheezes. She has no rales.   Abdominal: Soft. Bowel sounds are normal. She exhibits no distension and no mass. There is no tenderness. No hernia.   Musculoskeletal: She exhibits edema (Bilateral lower extremities).   NROM all major joints   Neurological: She is alert.   Skin: Skin is warm and dry. No rash noted.   Psychiatric: She has a normal mood and affect. Her behavior is normal.   Nursing note and vitals reviewed.      RECORDS REVIEW:   I have reviewed and interpreted the following lab test results  obtained at the time of the visit today: 10/7/18: Hgb 12.1,  Hct 34.9.  9/3/18: Cr. 0.6,     ASSESSMENT     Diagnoses and all orders for this visit:    Nonintractable generalized idiopathic epilepsy without status epilepticus (CMS/HCC)    Impaired mobility and ADLs    Osteoporosis without current pathological fracture, unspecified osteoporosis type        PLAN  No reports of recent seizure activity.    Continue supportive care as needed for mobilization and any assistance with ADLs.       Taking Fosamax as directed,for her Osteoporosis, tolerating it well.     [x]  Discussed Patient in detail with nursing/staff, addressed all needs today.     [x]  Plan of Care Reviewed   []  PT/OT Reviewed   []  Order Changes  []  Discharge Plans Reviewed  [x]  Advance Directive on file with Nursing Home.   [x]  POA on file with Nursing Home.   []  Code Status listed: [x]  Full Code   []  DNR         Marlene Garcia, APRN.

## 2019-01-23 ENCOUNTER — NURSING HOME (OUTPATIENT)
Dept: FAMILY MEDICINE CLINIC | Facility: CLINIC | Age: 64
End: 2019-01-23

## 2019-01-23 DIAGNOSIS — G40.309 NONINTRACTABLE GENERALIZED IDIOPATHIC EPILEPSY WITHOUT STATUS EPILEPTICUS (HCC): Chronic | ICD-10-CM

## 2019-01-23 DIAGNOSIS — S62.601A FRACTURE OF UNSPECIFIED PHALANX OF LEFT INDEX FINGER, INITIAL ENCOUNTER FOR CLOSED FRACTURE: ICD-10-CM

## 2019-01-23 DIAGNOSIS — M81.8 OTHER OSTEOPOROSIS WITHOUT CURRENT PATHOLOGICAL FRACTURE: ICD-10-CM

## 2019-01-23 DIAGNOSIS — Z78.9 IMPAIRED MOBILITY AND ADLS: Primary | Chronic | ICD-10-CM

## 2019-01-23 DIAGNOSIS — Z74.09 IMPAIRED MOBILITY AND ADLS: Primary | Chronic | ICD-10-CM

## 2019-01-23 DIAGNOSIS — M17.12 PRIMARY OSTEOARTHRITIS OF LEFT KNEE: ICD-10-CM

## 2019-01-23 DIAGNOSIS — E55.9 VITAMIN D DEFICIENCY: ICD-10-CM

## 2019-01-23 PROCEDURE — 99309 SBSQ NF CARE MODERATE MDM 30: CPT | Performed by: FAMILY MEDICINE

## 2019-02-13 ENCOUNTER — NURSING HOME (OUTPATIENT)
Dept: FAMILY MEDICINE CLINIC | Facility: CLINIC | Age: 64
End: 2019-02-13

## 2019-02-13 DIAGNOSIS — G40.309 NONINTRACTABLE GENERALIZED IDIOPATHIC EPILEPSY WITHOUT STATUS EPILEPTICUS (HCC): Chronic | ICD-10-CM

## 2019-02-13 DIAGNOSIS — Z78.9 IMPAIRED MOBILITY AND ADLS: Primary | Chronic | ICD-10-CM

## 2019-02-13 DIAGNOSIS — Z74.09 IMPAIRED MOBILITY AND ADLS: Primary | Chronic | ICD-10-CM

## 2019-02-13 DIAGNOSIS — M25.562 ARTHRALGIA OF LEFT KNEE: ICD-10-CM

## 2019-02-13 PROCEDURE — 99309 SBSQ NF CARE MODERATE MDM 30: CPT | Performed by: FAMILY MEDICINE

## 2019-02-26 ENCOUNTER — NURSING HOME (OUTPATIENT)
Dept: FAMILY MEDICINE CLINIC | Facility: CLINIC | Age: 64
End: 2019-02-26

## 2019-02-26 DIAGNOSIS — M25.562 ACUTE PAIN OF LEFT KNEE: ICD-10-CM

## 2019-02-26 DIAGNOSIS — M81.0 OSTEOPOROSIS WITHOUT CURRENT PATHOLOGICAL FRACTURE, UNSPECIFIED OSTEOPOROSIS TYPE: ICD-10-CM

## 2019-02-26 DIAGNOSIS — G40.309 NONINTRACTABLE GENERALIZED IDIOPATHIC EPILEPSY WITHOUT STATUS EPILEPTICUS (HCC): Chronic | ICD-10-CM

## 2019-02-26 DIAGNOSIS — M25.572 ACUTE LEFT ANKLE PAIN: Primary | ICD-10-CM

## 2019-02-26 DIAGNOSIS — Z74.09 IMPAIRED MOBILITY AND ADLS: Chronic | ICD-10-CM

## 2019-02-26 DIAGNOSIS — Z78.9 IMPAIRED MOBILITY AND ADLS: Chronic | ICD-10-CM

## 2019-02-26 PROCEDURE — 99309 SBSQ NF CARE MODERATE MDM 30: CPT | Performed by: NURSE PRACTITIONER

## 2019-02-26 NOTE — PROGRESS NOTES
Nursing Home Follow Up Note      Luigi Urena DO []   LATONIA Tony [x]  [address] [place] [address]  Phone: [phone]  Fax: [phone] Marcell Christina MD []    Rm Tarn DO []   [address] [place] [address]  Phone: [phone]  Fax: [phone]     PATIENT NAME: Viviane Peñaloza                                                                          YOB: 1955           DATE OF SERVICE: 2/26/2019  FACILITY:  [][place]   [] [place]   [x] [facility]   [] [facility]   [] Other ______________________________________________________________________      CHIEF COMPLAINT:  Pain in left knee and ankle    [facility]      HISTORY OF PRESENT ILLNESS:   Patient has reports that she has increased aching in her left knee and ankle,  that started yesterday and is worse today. She has not injured them in anyway. She states her orthopedist has given her a steroid injection in her knee in the past and it helped, but he told her that she will need to have a knee replacement at some point. She has never had the pain in her ankle before.    Routine visit for coordination of long term care needs and chronic conditions, epilepsy, osteoporosis, and debility     PAST MEDICAL & SURGICAL HISTORY:   Past Medical History:   Diagnosis Date   • Epilepsy (CMS/HCC)    • Fracture, clavicle     left    • Shoulder fracture, left       Past Surgical History:   Procedure Laterality Date   • OOPHORECTOMY           MEDICATIONS:  I have reviewed and reconciled the patients medication list in the patients chart at the skilled nursing facility today.      ALLERGIES:  No Known Allergies      SOCIAL HISTORY:  Social History     Socioeconomic History   • Marital status: Single     Spouse name: Not on file   • Number of children: Not on file   • Years of education: Not on file   • Highest education level: Not on file   Social Needs   • Financial resource strain: Not on file   • Food insecurity - worry:  Not on file   • Food insecurity - inability: Not on file   • Transportation needs - medical: Not on file   • Transportation needs - non-medical: Not on file   Occupational History   • Not on file   Tobacco Use   • Smoking status: Never Smoker   • Smokeless tobacco: Never Used   Substance and Sexual Activity   • Alcohol use: No   • Drug use: No   • Sexual activity: Defer   Other Topics Concern   • Not on file   Social History Narrative   • Not on file       FAMILY HISTORY:  Family History   Problem Relation Age of Onset   • Diabetes Mother    • Heart disease Mother    • Hypertension Mother    • Hypertension Father    • Diabetes Sister    • Hypertension Sister        REVIEW OF SYSTEMS:  Review of Systems   Constitutional: Negative for activity change, appetite change, chills, diaphoresis, fatigue, fever, unexpected weight gain and unexpected weight loss.   HENT: Negative for congestion, ear pain, mouth sores, nosebleeds, postnasal drip, rhinorrhea, sinus pressure, sneezing, sore throat and trouble swallowing.    Eyes: Negative for blurred vision, pain, discharge, redness, itching and visual disturbance.   Respiratory: Negative for apnea, cough, choking, chest tightness, shortness of breath, wheezing and stridor.    Cardiovascular: Negative for chest pain, palpitations and leg swelling.   Gastrointestinal: Negative for abdominal distention, abdominal pain, blood in stool, constipation, diarrhea, nausea, vomiting, GERD and indigestion.   Endocrine: Negative for polydipsia and polyuria.   Genitourinary: Negative for urinary incontinence, decreased urine volume, difficulty urinating, dysuria, flank pain, frequency, hematuria and urgency.   Musculoskeletal: Positive for arthralgias and myalgias. Negative for back pain, gait problem and joint swelling.        Pain in left knee and ankle   Skin: Negative for color change, dry skin, rash and skin lesions.   Allergic/Immunologic: Negative for environmental allergies.    Neurological: Positive for seizures and weakness. Negative for dizziness, speech difficulty, memory problem and confusion.   Psychiatric/Behavioral: Negative for behavioral problems, dysphoric mood, hallucinations, sleep disturbance and depressed mood. The patient is not nervous/anxious.          PHYSICAL EXAMINATION:   VITAL SIGNS: /78,   HR 78 bpm,   RR 18, 96% RA, Temp 97.5,   weight 205    Physical Exam   Constitutional: She appears well-developed and well-nourished.   HENT:   Head: Normocephalic.   Right Ear: External ear normal.   Left Ear: External ear normal.   Nose: Nose normal.   Eyes: Conjunctivae are normal.   Neck: Normal range of motion.   Cardiovascular: Normal rate, regular rhythm, normal heart sounds and intact distal pulses.   Pulmonary/Chest: Effort normal and breath sounds normal. No respiratory distress. She has no wheezes. She has no rales.   Abdominal: Soft. Bowel sounds are normal. She exhibits no distension and no mass. There is no tenderness. No hernia.   Musculoskeletal: She exhibits no edema (Bilateral lower extremities) or deformity.        Right knee: She exhibits swelling. She exhibits normal range of motion, no effusion, no ecchymosis, no deformity, no erythema and normal alignment. Tenderness found. Patellar tendon tenderness noted.        Right ankle: Tenderness (with ROM but not palpation). Lateral malleolus tenderness found.   Painful ROM of left knee and left ankle   Neurological: She is alert.   Skin: Skin is warm and dry. No rash noted.   Psychiatric: She has a normal mood and affect. Her behavior is normal.   Nursing note and vitals reviewed.      RECORDS REVIEW:   I have reviewed and interpreted the following lab test results  obtained at the time of the visit today: no new labs since last visit, patient on annual schedule. CMP normal    ASSESSMENT     Diagnoses and all orders for this visit:    Acute left ankle pain    Acute pain of left knee    Nonintractable  generalized idiopathic epilepsy without status epilepticus (CMS/HCC)    Osteoporosis without current pathological fracture, unspecified osteoporosis type    Impaired mobility and ADLs        PLAN  Give Depo-Medrol 80 mg IM once now and start Naproxen 500 mg PO BID X 14 days, for treatment of her pain. Apply ice to left knee Q8H PRN pain and swelling.     No reports of recent seizure activity. Patient has follow up with [facility] on 2/27/19.    Taking Fosamax as directed,for her Osteoporosis, tolerating it well.     Continue supportive care as needed for mobilization and any assistance with ADLs.       Patient was encouraged to keep me informed of any acute changes, lack of improvement, or any new concerning symptoms.      [x]  Discussed Patient in detail with nursing/staff, addressed all needs today.     [x]  Plan of Care Reviewed   []  PT/OT Reviewed   [x]  Order Changes  []  Discharge Plans Reviewed  [x]  Advance Directive on file with Nursing Home.   [x]  POA on file with Nursing Home.   []  Code Status listed: [x]  Full Code   []  DNR         LATONIA De lValle.

## 2019-04-03 NOTE — PROGRESS NOTES
Nursing Home Progress Note      Patient Name: Viviane Peñaloza   YOB: 1955    Date of Service: 11/21/18    Facility: [facility] []   [facility] []   [facility] [x]   [facility] []   Other []         Luigi Urena DO [x]  Marlene Garcia, LATONIA ?  [address] [place] [address]  Phone: [phone]  Fax: [phone] Marcell Christina MD ?  Rm Tran DO ?  [address] [place] [address]  Phone: [phone]  Fax: [phone]       CHIEF COMPLAINT:  CMM/LTC     HISTORY OF PRESENT ILLNESS:  [x]  Follow Up visit for coordination of chronic medical management needs and issues.    Epilepsy/impaired mobility and ADLs/post displaced fracture of the medial condyle of the left tibia with routine healing.    Patient continues to have orthopedic care of her left index finger fracture.  There has been no reported seizure-like activity since last visit.    PAST MEDICAL & SURGICAL HISTORY:  Past Medical History:   Diagnosis Date   • Epilepsy (CMS/HCC)    • Fracture, clavicle     left    • Shoulder fracture, left       Past Surgical History:   Procedure Laterality Date   • OOPHORECTOMY          MEDICATIONS:  Medication administration record for Viviane Peñaloza reviewed and reconciled today.    ALLERGIES:  No Known Allergies     REVIEW OF SYSTEMS:  • Appetite: Fair []   Good [x]   Poor []   Weight Loss []  [x]  Weight Stable   Unavoidable Weight Loss []  Tolerating Tube Feeding []    Supplements Provided []   • Constitutional: Negative for fever, chills, diaphoresis or fatigue and weight change.   • HENT: No dysphagia; no changes to vision/hearing/smell/taste; no epistaxis  • Eyes: Negative for redness and visual disturbance.   • Respiratory: Negative for shortness of breath, chest pain, cough or chest tightness.   • Cardiovascular: Negative for chest pain and palpitations.   • Gastrointestinal: Negative for abdominal distention, abdominal pain and blood in stool.   • Endocrine: Negative for cold  intolerance and heat intolerance.   • Genitourinary: Negative for difficulty urinating, dysuria and frequency.Negative for hematuria   • Musculoskeletal: Chronic myalgias and arthralgias  • Integumentary: No open wounds, rash or concerning skin lesions  • Neurological: Negative for syncope, weakness and headaches.  No seizure-like activity reported.  • Hematological: Negative for adenopathy. Does not bruise/bleed easily.   • Immunological: Negative for reported allergies or immunological disorders  • Psychological: No depression, anxiety or suicidal ideation    PHYSICAL EXAMINATION:  VITAL SIGNS: /76, HR 64 bpm, RR 20, weight 199, O2 sat 98% on room air    General Appearance:  [x]  Alert   [x]  Oriented x 3  [x]  No acute distress    []  Confused  []  Disoriented   []  Comatose   Head:  Atraumatic and normocephalic, without obvious abnormality.   Eyes:          PERRLA, conjunctivae and sclerae normal, no Icterus. No pallor. Extra-occular movements are within normal limits.   Ears:  Ears appear intact with no abnormalities noted.   Throat: No oral lesions, no thrush, oral mucosa moist.   Neck: Supple, trachea midline, no thyromegaly, no carotid bruit.   Back:   No kyphoscoliosis. No tenderness to palpation.   Lungs:   Chest shape is normal.  Audible air exchange noted all lung fields.  No wheezing.    Heart:  Normal S1 and S2, no murmur, no gallop, no rub. No JVD.   Abdomen:   Normal bowel sounds, no masses, no organomegaly. Soft, non-tender, non-distended, no guarding    Extremities: Moves all extremities, edema, cyanosis or clubbing.  Generalized weakness noted to the left lower extremity.      Pulses: Pulses palpable and equal bilaterally. left hand immobilized with splinting, pin placement for fracture stabilization.  Sensation intact ×3, radial pulse 2+ and capillary refill time normal.     Skin: No bleeding or rash.  Generalized dry skin noted.  Age-related atrophy of skin.   Neurologic: [x] Normal speech  []  Normal mental status    [x] Cranial nerves II through XII intact   [x]  No anosmia [x]  DTR 2+ [x]  Proprioception intact  [x]  No focal motor/sensory deficits     Psych/Mood:        [x]  No acute changes []  Depressed  Urinary:        [x]  Continent  []  Incontinent  []  Retention  []  F/C     []  UTI w/treatment in progress  RECORD REVIEW:   • Consult notes []   • Admission and subsequent notes []   •  [x] I have personally reviewed and interpreted available lab data, radiology studies and ECG obtained at time of visit.  [x] Medication Review: I have reviewed the patients active and prn medications at [facility]     ASSESSMENT   Diagnoses and all orders for this visit:    Impaired mobility and ADLs    Nonintractable generalized idiopathic epilepsy without status epilepticus (CMS/HCC)    Vitamin D deficiency    Fracture of unspecified phalanx of left index finger, initial encounter for closed fracture    Closed displaced fracture of medial condyle of left tibia with routine healing        PLAN  Planned continuation of supportive care as needed for mobilization and any assistance with ADLs.  No reports of seizure-like activity since last visit.  Continue surveillance labs as needed.  Continue current physical therapy/Occupational Therapy.  Appetite remains good, hemodynamically stable, weight stability with 1 pound gain.  Patient will keep scheduled/planned follow-up visits with orthopedic surgery concerning fracture care of the left hand.  Left hand splinted and pinning appears appropriate.     [x]  Discussed Patient in detail with nursing/staff, addressed all needs today.     [x]  Plan of Care Reviewed   [x]   PT/OT Reviewed   []  Order Changes  []   Discharge Plans Reviewed         Total face to face time spent with patient 25 minutes, 15 min in counseling.    Luigi Urena  11/21/18

## 2019-04-25 NOTE — PROGRESS NOTES
Nursing Home Progress Note      Patient Name: Viviane Peñaloza   YOB: 1955    Date of Service: 12/19/18    Facility: [place] []   [place] []   [facility] [x]   [facility] []   Other []         Luigi Urena DO [x]  LATONIA Tony ?  [address] [place] [address]  Phone: [phone]  Fax: [phone] Marcell Christina MD ?  Rm Tran DO ?  [address] [place] [address]  Phone: [phone]  Fax: [phone]       CHIEF COMPLAINT:  CMM/LTC     HISTORY OF PRESENT ILLNESS:  [x]  Follow Up visit for coordination of chronic medical management needs and issues.    Epilepsy/impaired mobility and ADLs/post displaced fracture of the medial condyle of the left tibia with routine healing.    There has been no reported seizure-like activity since last visit.    PAST MEDICAL & SURGICAL HISTORY:  Past Medical History:   Diagnosis Date   • Epilepsy (CMS/HCC)    • Fracture, clavicle     left    • Shoulder fracture, left       Past Surgical History:   Procedure Laterality Date   • OOPHORECTOMY          MEDICATIONS:  Medication administration record for Viviane Peñaloza reviewed and reconciled today.    ALLERGIES:  No Known Allergies     REVIEW OF SYSTEMS:  • Appetite: Fair []   Good [x]   Poor []   Weight Loss []  [x]  Weight Stable   Unavoidable Weight Loss []  Tolerating Tube Feeding []    Supplements Provided []   • Constitutional: Negative for fever, chills, diaphoresis or fatigue and weight change.   • HENT: No dysphagia; no changes to vision/hearing/smell/taste; no epistaxis  • Eyes: Negative for redness and visual disturbance.   • Respiratory: Negative for shortness of breath, chest pain, cough or chest tightness.   • Cardiovascular: Negative for chest pain and palpitations.   • Gastrointestinal: Negative for abdominal distention, abdominal pain and blood in stool.   • Endocrine: Negative for cold intolerance and heat intolerance.   • Genitourinary: Negative for difficulty  urinating, dysuria and frequency.Negative for hematuria   • Musculoskeletal: Chronic myalgias and arthralgias  • Integumentary: No open wounds, rash or concerning skin lesions  • Neurological: Negative for syncope, weakness and headaches.  No seizure-like activity reported.  • Hematological: Negative for adenopathy. Does not bruise/bleed easily.   • Immunological: Negative for reported allergies or immunological disorders  • Psychological: No depression, anxiety or suicidal ideation    PHYSICAL EXAMINATION:  VITAL SIGNS: /78, HR 64 bpm, RR 20, weight 202    General Appearance:  [x]  Alert   [x]  Oriented x 3  [x]  No acute distress    []  Confused  []  Disoriented   []  Comatose   Head:  Atraumatic and normocephalic, without obvious abnormality.   Eyes:          PERRLA, conjunctivae and sclerae normal, no Icterus. No pallor. Extra-occular movements are within normal limits.   Ears:  Ears appear intact with no abnormalities noted.   Throat: No oral lesions, no thrush, oral mucosa moist.   Neck: Supple, trachea midline, no thyromegaly, no carotid bruit.   Back:   No kyphoscoliosis. No tenderness to palpation.   Lungs:   Chest shape is normal.  Audible air exchange noted all lung fields.  No wheezing.    Heart:  Normal S1 and S2, no murmur, no gallop, no rub. No JVD.   Abdomen:   Normal bowel sounds, no masses, no organomegaly. Soft, non-tender, non-distended, no guarding    Extremities: Moves all extremities, edema, cyanosis or clubbing.  Generalized weakness noted to the left lower extremity.      Pulses: Pulses palpable and equal bilaterally. left hand immobilized with splinting, pin placement for fracture stabilization.  Sensation intact ×3, radial pulse 2+ and capillary refill time normal.     Skin: No bleeding or rash.  Generalized dry skin noted.  Age-related atrophy of skin.   Neurologic: [x] Normal speech []  Normal mental status    [x] Cranial nerves II through XII intact   [x]  No anosmia [x]  DTR 2+  [x]  Proprioception intact  [x]  No focal motor/sensory deficits     Psych/Mood:        [x]  No acute changes []  Depressed  Urinary:        [x]  Continent  []  Incontinent  []  Retention  []  F/C     []  UTI w/treatment in progress  RECORD REVIEW:   • Consult notes []   • Admission and subsequent notes []   •  [x] I have personally reviewed and interpreted available lab data, radiology studies and ECG obtained at time of visit.  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  No reports of seizure-like activity since last visit.  Continue surveillance labs as needed.  Continue current occupational therapy.  Appetite remains good, hemodynamically stable, weight stability with 3 pound gain.  Patient will keep scheduled/planned follow-up visits with orthopedic surgery concerning fracture care of the left hand, and left lower extremity.  Appears hemodynamically stable and reviewed vital signs.     [x]  Discussed Patient in detail with nursing/staff, addressed all needs today.     [x]  Plan of Care Reviewed   [x]   PT/OT Reviewed   []  Order Changes  []   Discharge Plans Reviewed         Total face to face time spent with patient 25 minutes, 15 min in counseling.    Luigi Urena  12/19/18

## 2019-05-23 ENCOUNTER — NURSING HOME (OUTPATIENT)
Dept: FAMILY MEDICINE CLINIC | Facility: CLINIC | Age: 64
End: 2019-05-23

## 2019-05-23 DIAGNOSIS — M25.562 ARTHRALGIA OF LEFT KNEE: Primary | ICD-10-CM

## 2019-05-23 DIAGNOSIS — M17.12 PRIMARY OSTEOARTHRITIS OF LEFT KNEE: ICD-10-CM

## 2019-05-23 PROCEDURE — 20610 DRAIN/INJ JOINT/BURSA W/O US: CPT | Performed by: FAMILY MEDICINE

## 2019-05-28 PROBLEM — M25.562 ARTHRALGIA OF LEFT KNEE: Status: ACTIVE | Noted: 2019-05-28

## 2019-05-28 PROBLEM — M17.12 PRIMARY OSTEOARTHRITIS OF LEFT KNEE: Status: ACTIVE | Noted: 2019-05-28

## 2019-05-28 RX ORDER — BETAMETHASONE SODIUM PHOSPHATE AND BETAMETHASONE ACETATE 3; 3 MG/ML; MG/ML
12 INJECTION, SUSPENSION INTRA-ARTICULAR; INTRALESIONAL; INTRAMUSCULAR; SOFT TISSUE ONCE
Status: SHIPPED | OUTPATIENT
Start: 2019-05-23

## 2019-05-28 NOTE — PROGRESS NOTES
Arthrocentesis  Date/Time: 5/23/2019 4:45 PM  Performed by: Luigi Urena DO  Authorized by: Luigi Urena DO   Indications: joint swelling and pain   Body area: knee  Joint: left knee  Local anesthesia used: yes    Anesthesia:  Local anesthesia used: yes  Anesthetic total: 1 mL    Sedation:  Patient sedated: no    Preparation: Patient was prepped and draped in the usual sterile fashion.  Needle size: 22 G  Ultrasound guidance: no  Approach: anterior  Patient tolerance: Patient tolerated the procedure well with no immediate complications

## 2019-06-05 NOTE — PROGRESS NOTES
Nursing Home Progress Note      Patient Name: Viviane Peñaloza   YOB: 1955    Date of Service: 1/23/2019    Facility: [place] []   [facility] []   [facility] [x]   [facility] []   Other []         Luigi Urena DO [x]  Marlene Garcia, LATONIA ?  [address] [place] [address]  Phone: [phone]  Fax: [phone] Marcell Christina MD ?  Rm Tran DO ?  [address] [place] [address]  Phone: [phone]  Fax: [phone]       CHIEF COMPLAINT:  CMM/LTC     HISTORY OF PRESENT ILLNESS:  [x]  Follow Up visit for coordination of chronic medical management needs and issues.    Epilepsy/impaired mobility and ADLs/post displaced fracture of the medial condyle of the left tibia with routine healing.  Recent fracture of phalanx of the left hand.    There has been no reported seizure-like activity since last visit.    PAST MEDICAL & SURGICAL HISTORY:  Past Medical History:   Diagnosis Date   • Epilepsy (CMS/HCC)    • Fracture, clavicle     left    • Shoulder fracture, left       Past Surgical History:   Procedure Laterality Date   • OOPHORECTOMY          MEDICATIONS:  Medication administration record for Viviane Peñaloza reviewed and reconciled today.    ALLERGIES:  No Known Allergies     REVIEW OF SYSTEMS:  • Appetite: Fair []   Good [x]   Poor []   Weight Loss []  [x]  Weight Stable   Unavoidable Weight Loss []  Tolerating Tube Feeding []    Supplements Provided []   • Constitutional: Negative for fever, chills, diaphoresis or fatigue and weight change.   • HENT: No dysphagia; no changes to vision/hearing/smell/taste; no epistaxis  • Eyes: Negative for redness and visual disturbance.   • Respiratory: Negative for shortness of breath, chest pain, cough or chest tightness.   • Cardiovascular: Negative for chest pain and palpitations.   • Gastrointestinal: Negative for abdominal distention, abdominal pain and blood in stool.   • Endocrine: Negative for cold intolerance and heat intolerance.    • Genitourinary: Negative for difficulty urinating, dysuria and frequency.Negative for hematuria   • Musculoskeletal: Chronic myalgias and arthralgias  • Integumentary: No open wounds, rash or concerning skin lesions  • Neurological: Negative for syncope, weakness and headaches.  No seizure-like activity reported.  • Hematological: Negative for adenopathy. Does not bruise/bleed easily.   • Immunological: Negative for reported allergies or immunological disorders  • Psychological: No depression, anxiety or suicidal ideation    PHYSICAL EXAMINATION:  VITAL SIGNS: /68, HR 74 bpm, RR 16, weight 204    General Appearance:  [x]  Alert   [x]  Oriented x 3  [x]  No acute distress    []  Confused  []  Disoriented   []  Comatose   Head:  Atraumatic and normocephalic, without obvious abnormality.   Eyes:          PERRLA, conjunctivae and sclerae normal, no Icterus. No pallor. Extra-occular movements are within normal limits.   Ears:  Ears appear intact with no abnormalities noted.   Throat: No oral lesions, no thrush, oral mucosa moist.   Neck: Supple, trachea midline, no thyromegaly, no carotid bruit.   Back:   No kyphoscoliosis. No tenderness to palpation.   Lungs:   Chest shape is normal.  Audible air exchange noted all lung fields.  No wheezing.    Heart:  Normal S1 and S2, no murmur, no gallop, no rub. No JVD.   Abdomen:   Normal bowel sounds, no masses, no organomegaly. Soft, non-tender, non-distended, no guarding    Extremities: Moves all extremities, edema, cyanosis or clubbing.  Generalized weakness noted to the left lower extremity.      Pulses: Pulses palpable and equal bilaterally.  Noted injury to the left hand.  Sensation intact ×3, radial pulse 2+ and capillary refill time normal.     Skin: No bleeding or rash.  Generalized dry skin noted.  Age-related atrophy of skin.   Neurologic: [x] Normal speech []  Normal mental status    [x] Cranial nerves II through XII intact   [x]  No anosmia [x]  DTR 2+ [x]   Proprioception intact  [x]  No focal motor/sensory deficits     Psych/Mood:        [x]  No acute changes []  Depressed  Urinary:        [x]  Continent  []  Incontinent  []  Retention  []  F/C     []  UTI w/treatment in progress  RECORD REVIEW:   • Consult notes []   • Admission and subsequent notes []   •  [x] I have personally reviewed and interpreted available lab data, radiology studies and ECG obtained at time of visit.  [x] Medication Review: I have reviewed the patients active and prn medications [facility]     ASSESSMENT   Diagnoses and all orders for this visit:    Impaired mobility and ADLs    Nonintractable generalized idiopathic epilepsy without status epilepticus (CMS/HCC)    Vitamin D deficiency    Primary osteoarthritis of left knee    Other osteoporosis without current pathological fracture    Fracture of unspecified phalanx of left index finger, initial encounter for closed fracture        PLAN  Planned continuation of supportive care as needed for mobilization and any assistance with ADLs.  No reports of seizure-like activity since last visit from staff.  Continue surveillance labs.  Continue current OT.  Appetite remains good, hemodynamically she is stable, weight stability noted with 2 pound gain.  Patient will keep scheduled/planned follow-up visits with orthopedic surgery concerning fracture care of the left hand, and left lower extremity.    [x]  Discussed Patient in detail with nursing/staff, addressed all needs today.     [x]  Plan of Care Reviewed   [x]   PT/OT Reviewed   []  Order Changes  []   Discharge Plans Reviewed         Total face to face time spent with patient 25 minutes, 15 min in counseling.    Luigi Urena  1/23/19

## 2019-06-06 RX ORDER — BETAMETHASONE SODIUM PHOSPHATE AND BETAMETHASONE ACETATE 3; 3 MG/ML; MG/ML
12 INJECTION, SUSPENSION INTRA-ARTICULAR; INTRALESIONAL; INTRAMUSCULAR; SOFT TISSUE ONCE
Status: SHIPPED | OUTPATIENT
Start: 2019-05-23

## 2019-06-26 NOTE — PROGRESS NOTES
Nursing Home Progress Note      Patient Name: Viviane Peñaloza   YOB: 1955    Date of Service: 2/13/2019    Facility: [facility] []   [place] []   [facility] [x]   [facility] []   Other []         Luigi Urena DO [x]  LATONIA Tony ?  [address] [place] [address]  Phone: [phone]  Fax: [phone] Marcell Christina MD ?  Rm Tran, DO ?  [address] [place] [address]  Phone: [phone]  Fax: [phone]       CHIEF COMPLAINT:  CMM/LTC     HISTORY OF PRESENT ILLNESS:  [x]  Follow Up visit for coordination of chronic medical management needs and issues.    Epilepsy/impaired mobility and ADLs/post displaced fracture of the medial condyle of the left tibia with routine healing.  Mobilization of left hand.    Without reports of seizure-like activity since my last evaluation.    PAST MEDICAL & SURGICAL HISTORY:  Past Medical History:   Diagnosis Date   • Epilepsy (CMS/HCC)    • Fracture, clavicle     left    • Shoulder fracture, left       Past Surgical History:   Procedure Laterality Date   • OOPHORECTOMY          MEDICATIONS:  Medication administration record for Viviane Peñaloza reviewed and reconciled today.    ALLERGIES:  No Known Allergies     REVIEW OF SYSTEMS:  • Appetite: Fair []   Good [x]   Poor []   Weight Loss []  [x]  Weight Stable   Unavoidable Weight Loss []  Tolerating Tube Feeding []    Supplements Provided []   • Constitutional: Negative for fever, chills, diaphoresis or fatigue and weight change.   • HENT: No dysphagia; no changes to vision/hearing/smell/taste; no epistaxis  • Eyes: Negative for redness and visual disturbance.   • Respiratory: Negative for shortness of breath, chest pain, cough or chest tightness.   • Cardiovascular: Negative for chest pain and palpitations.   • Gastrointestinal: Negative for abdominal distention, abdominal pain and blood in stool.   • Endocrine: Negative for cold intolerance and heat intolerance.   • Genitourinary:  Negative for difficulty urinating, dysuria and frequency.Negative for hematuria   • Musculoskeletal: Chronic myalgias and arthralgias  • Integumentary: No open wounds, rash or concerning skin lesions  • Neurological: Negative for syncope, weakness and headaches.  No seizure-like activity reported.  • Hematological: Negative for adenopathy. Does not bruise/bleed easily.   • Immunological: Negative for reported allergies or immunological disorders  • Psychological: No depression, anxiety or suicidal ideation    PHYSICAL EXAMINATION:  VITAL SIGNS: /72, HR 74 bpm, RR 20, weight 204    General Appearance:  [x]  Alert   [x]  Oriented x 3  [x]  No acute distress    []  Confused  []  Disoriented   []  Comatose   Head:  Atraumatic and normocephalic, without obvious abnormality.   Eyes:          PERRLA, conjunctivae and sclerae normal, no Icterus. No pallor. Extra-occular movements are within normal limits.   Ears:  Ears appear intact with no abnormalities noted.   Throat: No oral lesions, no thrush, oral mucosa moist.   Neck: Supple, trachea midline, no thyromegaly, no carotid bruit.   Back:   No kyphoscoliosis. No tenderness to palpation.   Lungs:   Chest shape is normal.  Audible air exchange noted all lung fields.  No wheezing.    Heart:  Normal S1 and S2, no murmur, no gallop, no rub. No JVD.   Abdomen:   Normal bowel sounds, no masses, no organomegaly. Soft, non-tender, non-distended, no guarding    Extremities: Moves all extremities, edema, cyanosis or clubbing.  Generalized weakness noted to the left lower extremity.      Pulses: Pulses palpable and equal bilaterally.  Noted injury to the left hand.  Sensation intact ×3, radial pulse 2+ and capillary refill time normal.     Skin: No bleeding or rash.  Generalized dry skin noted.  Age-related atrophy of skin.   Neurologic: [x] Normal speech []  Normal mental status    [x] Cranial nerves II through XII intact   [x]  No anosmia [x]  DTR 2+ [x]  Proprioception  intact  [x]  No focal motor/sensory deficits     Psych/Mood:        [x]  No acute changes []  Depressed  Urinary:        [x]  Continent  []  Incontinent  []  Retention  []  F/C     []  UTI w/treatment in progress  RECORD REVIEW:   • Consult notes []   • Admission and subsequent notes []   •  [x] I have personally reviewed and interpreted available lab data, radiology studies and ECG obtained at time of visit.  [x] Medication Review: I have reviewed the patients active and prn medications at [facility]     ASSESSMENT   Diagnoses and all orders for this visit:    Impaired mobility and ADLs    Arthralgia of left knee    Nonintractable generalized idiopathic epilepsy without status epilepticus (CMS/HCC)        PLAN  Continue supportive care as needed for mobilization/transfers and any assistance with ADLs.  No reports of seizure-like activity since last visit from staff, prophylactic medication as well as as needed.  Continue surveillance labs when needed.  Continue current OT that is being utilized.  Appetite is good, hemodynamically she remains stable, weight stability noted about change.  Patient will keep scheduled/planned follow-up visits with orthopedic surgery concerning fracture care of the left hand, and left lower extremity.    [x]  Discussed Patient in detail with nursing/staff, addressed all needs today.     [x]  Plan of Care Reviewed   [x]   PT/OT Reviewed   []  Order Changes  []   Discharge Plans Reviewed         Total face to face time spent with patient 25 minutes, 15 min in counseling.    Luigi Urena  2/13/2019

## 2019-07-26 ENCOUNTER — NURSING HOME (OUTPATIENT)
Dept: FAMILY MEDICINE CLINIC | Facility: CLINIC | Age: 64
End: 2019-07-26

## 2019-07-26 DIAGNOSIS — M25.562 ARTHRALGIA OF LEFT KNEE: ICD-10-CM

## 2019-07-26 DIAGNOSIS — Z74.09 IMPAIRED MOBILITY AND ADLS: Chronic | ICD-10-CM

## 2019-07-26 DIAGNOSIS — M17.12 PRIMARY OSTEOARTHRITIS OF LEFT KNEE: ICD-10-CM

## 2019-07-26 DIAGNOSIS — M81.8 OTHER OSTEOPOROSIS WITHOUT CURRENT PATHOLOGICAL FRACTURE: ICD-10-CM

## 2019-07-26 DIAGNOSIS — Z78.9 IMPAIRED MOBILITY AND ADLS: Chronic | ICD-10-CM

## 2019-07-26 DIAGNOSIS — E78.5 HYPERLIPIDEMIA, UNSPECIFIED HYPERLIPIDEMIA TYPE: ICD-10-CM

## 2019-07-26 DIAGNOSIS — G40.309 NONINTRACTABLE GENERALIZED IDIOPATHIC EPILEPSY WITHOUT STATUS EPILEPTICUS (HCC): Primary | Chronic | ICD-10-CM

## 2019-07-26 PROCEDURE — 99308 SBSQ NF CARE LOW MDM 20: CPT | Performed by: NURSE PRACTITIONER

## 2019-07-26 NOTE — PROGRESS NOTES
Nursing Home Follow Up Note      Luigi Urena DO  []  LATONIA Tony  []  LATONIA Mullins [x]  [address] [facility], [place] [address]  Phone: [phone]  Fax: [phone] Marcell Christina MD  []  Rm Tran DO  []  [address] [place] [address]  Phone: [phone]  Fax: [phone]     PATIENT NAME: Viviane Peñaloza                                                                          YOB: 1955           DATE OF SERVICE: 07/26/2019  FACILITY:   [] [facility]    [] [facility]    [x] [facility]     [] [facility]    [] Other ______________________________________________________________________     CHIEF COMPLAINT:  Follow-up epilepsy, osteoporosis, hyperlipidemia      HISTORY OF PRESENT ILLNESS:   Seen for continued medical management and long-term care needs  She says her left knee will still come in and out of place at times.  Has been working with Dr. Urena on this issue.  No other needs or complaints.    No events per nursing    REVIEW OF SYSTEMS:  Gen- No fevers, chills  CV- No chest pain, palpitations  Resp- No cough, dyspnea  GI- No N/V/D, abd pain    Otherwise ROS is negative except as mentioned in the HPI.    PAST MEDICAL & SURGICAL HISTORY:   Past Medical History:   Diagnosis Date   • Epilepsy (CMS/HCC)    • Fracture, clavicle     left    • Shoulder fracture, left       Past Surgical History:   Procedure Laterality Date   • OOPHORECTOMY           MEDICATIONS:  I have reviewed and reconciled the patients medication list in the patients chart at the [facility] nursing [facility] today.      ALLERGIES:  I have reviewed allergies on file at the [facility]  No Known Allergies      SOCIAL HISTORY:  Social History     Socioeconomic History   • Marital status: Single     Spouse name: Not on file   • Number of children: Not on file   • Years of education: Not on file   • Highest education level: Not on file   Tobacco Use   • Smoking status: Never Smoker   •  Smokeless tobacco: Never Used   Substance and Sexual Activity   • Alcohol use: No   • Drug use: No   • Sexual activity: Defer       PHYSICAL EXAMINATION:   VITAL SIGNS:   BP: 98/63, HR: 65, Resp: 18, O2 Sat: 98, Temp: 97.3, Weight: 212    Constitutional: No acute distress, awake, alert, just finished playing bingo  HENT: NCAT, mucous membranes moist  Respiratory: Clear to auscultation bilaterally, respiratory effort normal   Cardiovascular: RRR, no murmurs, rubs, or gallops  Gastrointestinal: Positive bowel sounds, soft, nontender, nondistended  Musculoskeletal: No bilateral ankle edema, PPP  Psychiatric: Appropriate affect, cooperative  Neurologic: alert and interactive, speech clear  Skin: No rashes    RECORDS REVIEW:   I have reviewed labs available at time of visit.  The following are pertinent: 2/5/2019 BUN 11, creatinine 0.7, sodium 139, potassium 4.4, WBC 4.8, platelet 194, hemoglobin 12, hematocrit 38    ASSESSMENT     Diagnoses and all orders for this visit:    Nonintractable generalized idiopathic epilepsy without status epilepticus (CMS/HCC)    Other osteoporosis without current pathological fracture    Primary osteoarthritis of left knee    Arthralgia of left knee    Impaired mobility and ADLs    Hyperlipidemia, unspecified hyperlipidemia type        PLAN  --Continue current medication regimen  --Keppra and phenobarbital levels reviewed, continue to check every 6 months  --Staff to continue to assist with ADLs, medications, etc.  --Continue statin    [x]  Discussed Patient in detail with nursing/staff, addressed all needs today.     [x]  Plan of Care Reviewed   []  PT/OT Reviewed   []  Order Changes  []  Discharge Plans Reviewed  [x]  Advance Directive on file with Nursing Home.   [x]  POA on file with Nursing Home.   [x]  Code Status: I have reviewed the CODE STATUS on file at the skilled nursing facility       May oMrtensen, APRN.

## 2019-08-13 ENCOUNTER — NURSING HOME (OUTPATIENT)
Dept: FAMILY MEDICINE CLINIC | Facility: CLINIC | Age: 64
End: 2019-08-13

## 2019-08-13 DIAGNOSIS — M25.562 ARTHRALGIA OF LEFT KNEE: ICD-10-CM

## 2019-08-13 DIAGNOSIS — J06.9 ACUTE URI: Primary | ICD-10-CM

## 2019-08-13 PROCEDURE — 99308 SBSQ NF CARE LOW MDM 20: CPT | Performed by: NURSE PRACTITIONER

## 2019-08-14 NOTE — PROGRESS NOTES
Nursing Home Follow Up Note      Luigi Urena DO  []  LATONIA Tony  []  LATONIA Mullins [x]  [address] [facility], [place] [address]  Phone: [phone]  Fax: [phone] Marcell Christina MD  []  Rm Tran DO  []  [address] [place] [address]  Phone: [phone]  Fax: [phone]     PATIENT NAME: Viviane Peñaloza                                                                          YOB: 1955           DATE OF SERVICE: 08/13/2019  FACILITY:   [] [facility]    [] [facility]    [x] [facility]     [] [facility]    [] Other ______________________________________________________________________     CHIEF COMPLAINT:  Sore throat, left knee pain      HISTORY OF PRESENT ILLNESS:   Patient had sore throat, nasal drainage and cough over the weekend but state her symptoms have improved. She also asks about a MRI for her left knee and when she is do for another injection    REVIEW OF SYSTEMS:  Gen- No fevers, chills  CV- No chest pain, palpitations  Resp- No cough, dyspnea  GI- No N/V/D, abd pain    Otherwise ROS is negative except as mentioned in the HPI.    PAST MEDICAL & SURGICAL HISTORY:   Past Medical History:   Diagnosis Date   • Epilepsy (CMS/HCC)    • Fracture, clavicle     left    • Shoulder fracture, left       Past Surgical History:   Procedure Laterality Date   • OOPHORECTOMY           MEDICATIONS:  I have reviewed and reconciled the patients medication list in the patients chart at the [facility] today.      ALLERGIES:  I have reviewed allergies on file at the [facility]  No Known Allergies      SOCIAL HISTORY:  Social History     Socioeconomic History   • Marital status: Single     Spouse name: Not on file   • Number of children: Not on file   • Years of education: Not on file   • Highest education level: Not on file   Tobacco Use   • Smoking status: Never Smoker   • Smokeless tobacco: Never Used   Substance and Sexual Activity   •  Alcohol use: No   • Drug use: No   • Sexual activity: Defer       PHYSICAL EXAMINATION:   VITAL SIGNS:   BP: 139/62, HR: 67, Resp: 18, O2 Sat: 90, Temp: 97.3, Weight: 212    Constitutional: No acute distress, awake, alert, sitting up in common area  HENT: NCAT, mucous membranes moist  Respiratory: Clear to auscultation bilaterally, respiratory effort normal, on RA   Cardiovascular: RRR, no murmurs, rubs, or gallops  Gastrointestinal: Positive bowel sounds, soft, nontender, nondistended  Musculoskeletal: No bilateral ankle edema, PPP, tenderness of left knee laterally   Psychiatric: Appropriate affect, cooperative  Neurologic: alert and interactive, speech clear  Skin: No rashes    RECORDS REVIEW:   I have reviewed labs available at time of visit.    ASSESSMENT     Diagnoses and all orders for this visit:    Acute URI    Arthralgia of left knee        PLAN  -- symptoms consistent with viral URI. She has already improved. No further treatment needed  -- we discussed her knee pain and MRI. She said she would not want to have knee surgery. Will hold on MRI since it will not change our current plan. I will reach out to Dr. Urena to see when she is due for another injection  -- continue arthritis medications    [x]  Discussed Patient in detail with nursing/staff, addressed all needs today.     [x]  Plan of Care Reviewed   []  PT/OT Reviewed   []  Order Changes  []  Discharge Plans Reviewed  [x]  Advance Directive on file with Nursing Home.   [x]  POA on file with Nursing Home.   [x]  Code Status: I have reviewed the CODE STATUS on file at the skilled nursing facility       LATONAI Mullins.

## 2019-08-27 ENCOUNTER — NURSING HOME (OUTPATIENT)
Dept: FAMILY MEDICINE CLINIC | Facility: CLINIC | Age: 64
End: 2019-08-27

## 2019-08-27 DIAGNOSIS — M25.562 ARTHRALGIA OF LEFT KNEE: ICD-10-CM

## 2019-08-27 DIAGNOSIS — J06.9 ACUTE URI: Primary | ICD-10-CM

## 2019-08-27 PROCEDURE — 99308 SBSQ NF CARE LOW MDM 20: CPT | Performed by: NURSE PRACTITIONER

## 2019-08-30 NOTE — PROGRESS NOTES
Nursing Home Follow Up Note      Luigi Urena DO  []  LATONIA Tony  []  LATONIA Mullins [x]  [address] [facility], [place] [address]  Phone: [phone]  Fax: [phone] Marcell Christina MD  []  Rm Tran DO  []  [address] [place] [address]  Phone: [phone]  Fax: [phone]     PATIENT NAME: Viviane Peñaloza                                                                          YOB: 1955           DATE OF SERVICE: 08/27/2019  FACILITY:   [] [facility]    [] [facility]    [x] [facility]     [] [facility]    [] Other ______________________________________________________________________     CHIEF COMPLAINT:  Cough and nasal congestion      HISTORY OF PRESENT ILLNESS:   Asked by nursing to see due to cough and nasal congestion  She states her symptoms are minimal. Does not want any additional medications. Has minor cough without soa. + nasal congestion with rhinorrhea. Eating and drinking well. She does mention that her left knee seems to be popping out of place more often    REVIEW OF SYSTEMS:  Gen- No fevers, chills  CV- No chest pain, palpitations  GI- No N/V/D, abd pain    Otherwise ROS is negative except as mentioned in the HPI.    PAST MEDICAL & SURGICAL HISTORY:   Past Medical History:   Diagnosis Date   • Epilepsy (CMS/HCC)    • Fracture, clavicle     left    • Shoulder fracture, left       Past Surgical History:   Procedure Laterality Date   • OOPHORECTOMY           MEDICATIONS:  I have reviewed and reconciled the patients medication list in the patients chart at the [facility] today.      ALLERGIES:  I have reviewed allergies on file at the [facility]  No Known Allergies      SOCIAL HISTORY:  Social History     Socioeconomic History   • Marital status: Single     Spouse name: Not on file   • Number of children: Not on file   • Years of education: Not on file   • Highest education level: Not on file   Tobacco Use   • Smoking  status: Never Smoker   • Smokeless tobacco: Never Used   Substance and Sexual Activity   • Alcohol use: No   • Drug use: No   • Sexual activity: Defer       PHYSICAL EXAMINATION:   VITAL SIGNS:   BP: 107/63, HR: 74, Resp: 16, O2 Sat: 95,     Constitutional: No acute distress, awake, alert, sitting up in common area knitting  HENT: NCAT, mucous membranes moist  Respiratory: Clear to auscultation bilaterally, respiratory effort normal, on RA   Cardiovascular: RRR, no murmurs, rubs, or gallops  Gastrointestinal: Positive bowel sounds, soft, nontender, nondistended  Musculoskeletal: No bilateral ankle edema, PPP  Psychiatric: Appropriate affect, cooperative  Neurologic: alert and interactive, speech clear  Skin: No rashes    RECORDS REVIEW:   I have reviewed labs available at time of visit.     ASSESSMENT     Diagnoses and all orders for this visit:    Acute URI    Arthralgia of left knee        PLAN  -- post viral cough with recent URI. Discussed cough can last up to 3 weeks. She is afebrile and has minimal symptoms. Ok to watch for now. No anbx or other medications needed. Continue supportive care  -- passed her knee pain complaints on to Dr. Urena who is actively managing with injections. May need MRI +/- ortho consult if continues to come out of place    [x]  Discussed Patient in detail with nursing/staff, addressed all needs today.     [x]  Plan of Care Reviewed   []  PT/OT Reviewed   []  Order Changes  []  Discharge Plans Reviewed  [x]  Advance Directive on file with Nursing Home.   [x]  POA on file with Nursing Home.   [x]  Code Status: I have reviewed the CODE STATUS on file at the skilled nursing facility       LATONIA Mullins.

## 2019-09-04 ENCOUNTER — NURSING HOME (OUTPATIENT)
Dept: FAMILY MEDICINE CLINIC | Facility: CLINIC | Age: 64
End: 2019-09-04

## 2019-09-04 DIAGNOSIS — Z74.09 IMPAIRED MOBILITY AND ADLS: Primary | Chronic | ICD-10-CM

## 2019-09-04 DIAGNOSIS — E78.2 MIXED HYPERLIPIDEMIA: ICD-10-CM

## 2019-09-04 DIAGNOSIS — G40.309 NONINTRACTABLE GENERALIZED IDIOPATHIC EPILEPSY WITHOUT STATUS EPILEPTICUS (HCC): Chronic | ICD-10-CM

## 2019-09-04 DIAGNOSIS — M17.12 PRIMARY OSTEOARTHRITIS OF LEFT KNEE: ICD-10-CM

## 2019-09-04 DIAGNOSIS — Z78.9 IMPAIRED MOBILITY AND ADLS: Primary | Chronic | ICD-10-CM

## 2019-09-04 DIAGNOSIS — E55.9 VITAMIN D DEFICIENCY: ICD-10-CM

## 2019-09-04 DIAGNOSIS — M25.562 ARTHRALGIA OF LEFT KNEE: ICD-10-CM

## 2019-09-04 PROCEDURE — 99309 SBSQ NF CARE MODERATE MDM 30: CPT | Performed by: FAMILY MEDICINE

## 2019-09-04 NOTE — PROGRESS NOTES
Nursing Home Progress Note      Patient Name: Viviane Peñaloza   YOB: 1955    Date of Service: 9/4/2019    Facility: [facility] []   [facility] []   [facility] [x]   [facility] []   Other []         Luigi Urena DO [x]  Marlene Garcia, LATONIA ?  [address] [place] [address]  Phone: [phone]  Fax: [phone] Marcell Christina MD ?  Rm Tran DO ?  [address] [place] [address]  Phone: [phone]  Fax: [phone]       CHIEF COMPLAINT:  CMM/LTC     HISTORY OF PRESENT ILLNESS:  [x]  Follow Up visit for coordination of chronic medical management needs and issues.    Epilepsy/impaired mobility and ADLs.    Without reports of seizure-like activity since my last evaluation.    PAST MEDICAL & SURGICAL HISTORY:  Past Medical History:   Diagnosis Date   • Epilepsy (CMS/HCC)    • Fracture, clavicle     left    • Shoulder fracture, left       Past Surgical History:   Procedure Laterality Date   • OOPHORECTOMY          MEDICATIONS:  Medication administration record for Viviane Peñaloza reviewed and reconciled today.    ALLERGIES:  No Known Allergies     REVIEW OF SYSTEMS:  • Appetite: Fair []   Good [x]   Poor []   Weight Loss []  [x]  Weight Stable   Unavoidable Weight Loss []  Tolerating Tube Feeding []    Supplements Provided []   • Constitutional: Negative for fever, chills, diaphoresis or fatigue and weight change.   • HENT: No dysphagia; no changes to vision/hearing/smell/taste; no epistaxis  • Eyes: Negative for redness and visual disturbance.   • Respiratory: Negative for shortness of breath, chest pain, cough or chest tightness.   • Cardiovascular: Negative for chest pain and palpitations.   • Gastrointestinal: Negative for abdominal distention, abdominal pain and blood in stool.   • Endocrine: Negative for cold intolerance and heat intolerance.   • Genitourinary: Negative for difficulty urinating, dysuria and frequency.Negative for hematuria   • Musculoskeletal: Chronic  myalgias and arthralgias  • Integumentary: No open wounds, rash or concerning skin lesions  • Neurological: Negative for syncope, weakness and headaches.  No seizure-like activity reported.  • Hematological: Negative for adenopathy. Does not bruise/bleed easily.   • Immunological: Negative for reported allergies or immunological disorders  • Psychological: No depression, anxiety or suicidal ideation    PHYSICAL EXAMINATION:  VITAL SIGNS: /68, HR 66 bpm, RR 18, weight 214    General Appearance:  [x]  Alert   [x]  Oriented x 3  [x]  No acute distress    []  Confused  []  Disoriented   []  Comatose   Head:  Atraumatic and normocephalic, without obvious abnormality.   Eyes:          PERRLA, conjunctivae and sclerae normal, no Icterus. No pallor. Extra-occular movements are within normal limits.   Ears:  Ears appear intact with no abnormalities noted.   Throat: No oral lesions, no thrush, oral mucosa moist.   Neck: Supple, trachea midline, no thyromegaly, no carotid bruit.   Back:   No kyphoscoliosis. No tenderness to palpation.   Lungs:   Chest shape is normal.  Audible air exchange noted all lung fields.  No wheezing.    Heart:  Normal S1 and S2, no murmur, no gallop, no rub. No JVD.   Abdomen:   Normal bowel sounds, no masses, no organomegaly. Soft, non-tender, non-distended, no guarding    Extremities: Moves all extremities, edema, cyanosis or clubbing.  Generalized weakness noted to the left lower extremity.      Pulses: Pulses palpable and equal bilaterally.  Medial and lateral joint line tenderness of the left knee, active and passive range of motion intact.  Patellar maltracking on range of motion testing of the knee additionally, without findings of clinical subluxation or dislocation.  No appreciable ligamentous laxity sensation intact ×3, radial pulse 2+ and capillary refill time normal.  Homans/Nguyen test negative.   Skin: No bleeding or rash.  Generalized dry skin noted.  Age-related atrophy of  skin.   Neurologic: [x] Normal speech []  Normal mental status    [x] Cranial nerves II through XII intact   [x]  No anosmia [x]  DTR 2+ [x]  Proprioception intact  [x]  No focal motor/sensory deficits     Psych/Mood:        [x]  No acute changes []  Depressed  Urinary:        [x]  Continent  []  Incontinent  []  Retention  []  F/C     []  UTI w/treatment in progress  RECORD REVIEW:   • Consult notes []   • Admission and subsequent notes []   •  [x] I have personally reviewed and interpreted available lab data, radiology studies and ECG obtained at time of visit.  [x] Medication Review: I have reviewed the patients active and prn medications at [facility]     ASSESSMENT   Diagnoses and all orders for this visit:    Impaired mobility and ADLs    Nonintractable generalized idiopathic epilepsy without status epilepticus (CMS/HCC)    Primary osteoarthritis of left knee    Arthralgia of left knee    Mixed hyperlipidemia    Vitamin D deficiency        PLAN  Supportive care as needed for mobilization/transfers and any assistance with ADL needs.  She is without reports of seizure-like activity since last visit from staff/nursing, continue prophylactic medication as well as as needed.  Continue surveillance labs.  Appetite remains good, review of vital signs demonstrate hemodynamic stability, blood pressure is at goal.  Noted slow and progressive weight gain over the last 4 to 6 months.  Plan to discuss with nutritional/dietitian concerning dietary modifications to aid in weight transition to a leaner body mass.  Consideration to repeat steroid injection in the near future given her sustained response to the last one.  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  I also suspect she has meniscal injuries of chronic nature that are contributing to her feelings of continued pain with weightbearing activities.  Will make an effort  to try to have patient fitted for a hinged knee brace with patellar buttress to aid in patellar maltracking.    [x]  Discussed Patient in detail with nursing/staff, addressed all needs today.     [x]  Plan of Care Reviewed   [x]   PT/OT Reviewed   []  Order Changes  []   Discharge Plans Reviewed         Total face to face time spent with patient 25 minutes, 15 min in counseling.    Luigi Urena  9/4/2019

## 2019-09-25 ENCOUNTER — NURSING HOME (OUTPATIENT)
Dept: FAMILY MEDICINE CLINIC | Facility: CLINIC | Age: 64
End: 2019-09-25

## 2019-09-25 DIAGNOSIS — E78.2 MIXED HYPERLIPIDEMIA: ICD-10-CM

## 2019-09-25 DIAGNOSIS — E55.9 VITAMIN D DEFICIENCY: ICD-10-CM

## 2019-09-25 DIAGNOSIS — M25.562 ARTHRALGIA OF LEFT KNEE: ICD-10-CM

## 2019-09-25 DIAGNOSIS — M17.12 PRIMARY OSTEOARTHRITIS OF LEFT KNEE: ICD-10-CM

## 2019-09-25 DIAGNOSIS — Z78.9 IMPAIRED MOBILITY AND ADLS: Primary | Chronic | ICD-10-CM

## 2019-09-25 DIAGNOSIS — G40.309 NONINTRACTABLE GENERALIZED IDIOPATHIC EPILEPSY WITHOUT STATUS EPILEPTICUS (HCC): Chronic | ICD-10-CM

## 2019-09-25 DIAGNOSIS — Z74.09 IMPAIRED MOBILITY AND ADLS: Primary | Chronic | ICD-10-CM

## 2019-09-25 PROCEDURE — 99309 SBSQ NF CARE MODERATE MDM 30: CPT | Performed by: FAMILY MEDICINE

## 2019-10-02 NOTE — PROGRESS NOTES
Nursing Home Progress Note      Patient Name: Viviane Peñaloza   YOB: 1955    Date of Service: 9/25/2019    Facility: [facility] []   [facility] []   [facility] [x]   [facility] []   Other []         Luigi Urena DO [x]  LATONIA Tony ?  [address] [place] [address]  Phone: [phone]  Fax: [phone] Marcell Christina MD ?  Rm Tran, DO ?  [address] [place] [address]  Phone: [phone]  Fax: [phone]       CHIEF COMPLAINT:  CMM/LTC     HISTORY OF PRESENT ILLNESS:  [x]  Follow Up visit for coordination of chronic medical management needs and issues.    Epilepsy/impaired mobility and ADLs.      PAST MEDICAL & SURGICAL HISTORY:  Past Medical History:   Diagnosis Date   • Epilepsy (CMS/HCC)    • Fracture, clavicle     left    • Shoulder fracture, left       Past Surgical History:   Procedure Laterality Date   • OOPHORECTOMY          MEDICATIONS:  Medication administration record for Viviane Peñaloza reviewed and reconciled today.    ALLERGIES:  No Known Allergies     REVIEW OF SYSTEMS:  • Appetite: Fair []   Good [x]   Poor []   Weight Loss []  [x]  Weight Stable   Unavoidable Weight Loss []  Tolerating Tube Feeding []    Supplements Provided []   • Constitutional: Negative for fever, chills, diaphoresis or fatigue and weight change.   • HENT: No dysphagia; no changes to vision/hearing/smell/taste; no epistaxis  • Eyes: Negative for redness and visual disturbance.   • Respiratory: Negative for shortness of breath, chest pain, cough or chest tightness.   • Cardiovascular: Negative for chest pain and palpitations.   • Gastrointestinal: Negative for abdominal distention, abdominal pain and blood in stool.   • Endocrine: Negative for cold intolerance and heat intolerance.   • Genitourinary: Negative for difficulty urinating, dysuria and frequency.Negative for hematuria   • Musculoskeletal: Chronic myalgias and arthralgias  • Integumentary: No open wounds, rash or  concerning skin lesions  • Neurological: Negative for syncope, weakness and headaches.  No seizure-like activity reported.  • Hematological: Negative for adenopathy. Does not bruise/bleed easily.   • Immunological: Negative for reported allergies or immunological disorders  • Psychological: No depression, anxiety or suicidal ideation    PHYSICAL EXAMINATION:  VITAL SIGNS: /60, HR 60 bpm, RR 16, weight 214    General Appearance:  [x]  Alert   [x]  Oriented x 3  [x]  No acute distress    []  Confused  []  Disoriented   []  Comatose   Head:  Atraumatic and normocephalic, without obvious abnormality.   Eyes:          PERRLA, conjunctivae and sclerae normal, no Icterus. No pallor. Extra-occular movements are within normal limits.   Ears:  Ears appear intact with no abnormalities noted.   Throat: No oral lesions, no thrush, oral mucosa moist.   Neck: Supple, trachea midline, no thyromegaly, no carotid bruit.   Back:   No kyphoscoliosis. No tenderness to palpation.   Lungs:   Chest shape is normal.  Audible air exchange noted all lung fields.  No wheezing.    Heart:  Normal S1 and S2, no murmur, no gallop, no rub. No JVD.   Abdomen:   Normal bowel sounds, no masses, no organomegaly. Soft, non-tender, non-distended, no guarding    Extremities: Moves all extremities, edema, cyanosis or clubbing.  Generalized weakness noted to the left lower extremity.      Pulses: Pulses palpable and equal bilaterally.  Medial and lateral joint line tenderness of the left knee, active and passive range of motion intact.  Patellar maltracking on range of motion testing of the knee additionally, without findings of clinical subluxation or dislocation.  No appreciable ligamentous laxity sensation intact ×3, radial pulse 2+ and capillary refill time normal.  Homans/Nguyen test negative.   Skin: No bleeding or rash.  Generalized dry skin noted.  Age-related atrophy of skin.   Neurologic: [x] Normal speech []  Normal mental status    [x]  Cranial nerves II through XII intact   [x]  No anosmia [x]  DTR 2+ [x]  Proprioception intact  [x]  No focal motor/sensory deficits     Psych/Mood:        [x]  No acute changes []  Depressed  Urinary:        [x]  Continent  []  Incontinent  []  Retention  []  F/C     []  UTI w/treatment in progress  RECORD REVIEW:   • Consult notes []   • Admission and subsequent notes []   •  [x] I have personally reviewed and interpreted available lab data, radiology studies and ECG obtained at time of visit.  [x] Medication Review: I have reviewed the patients active and prn medications at [facility]     ASSESSMENT   Diagnoses and all orders for this visit:    Impaired mobility and ADLs    Arthralgia of left knee    Mixed hyperlipidemia    Primary osteoarthritis of left knee    Nonintractable generalized idiopathic epilepsy without status epilepticus (CMS/HCC)    Vitamin D deficiency        PLAN  10 you supportive care for any mobilization/transfer needs, and any assistance with ADL assistance.  There have been no reports of seizure-like activity since last visit from staff/nursing, continue prophylactic medication as well.  Continue surveillance labs already ordered.  Appetite remains good, review of vital signs demonstrates continued hemodynamic stability, BP remains at goal.  Weight stability noted.  Consideration to repeat steroid injection in the near future given her sustained response to the last one.  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  I also suspect she has meniscal injuries of chronic nature that are contributing to her feelings of continued pain with weightbearing activities.    [x]  Discussed Patient in detail with nursing/staff, addressed all needs today.     [x]  Plan of Care Reviewed   [x]   PT/OT Reviewed   []  Order Changes  []   Discharge Plans Reviewed        Luigi Urena  9/25/2019

## 2019-10-23 ENCOUNTER — NURSING HOME (OUTPATIENT)
Dept: FAMILY MEDICINE CLINIC | Facility: CLINIC | Age: 64
End: 2019-10-23

## 2019-10-23 DIAGNOSIS — Z74.09 IMPAIRED MOBILITY AND ADLS: Primary | Chronic | ICD-10-CM

## 2019-10-23 DIAGNOSIS — E55.9 VITAMIN D DEFICIENCY: ICD-10-CM

## 2019-10-23 DIAGNOSIS — E78.2 MIXED HYPERLIPIDEMIA: ICD-10-CM

## 2019-10-23 DIAGNOSIS — M81.0 AGE-RELATED OSTEOPOROSIS WITHOUT CURRENT PATHOLOGICAL FRACTURE: ICD-10-CM

## 2019-10-23 DIAGNOSIS — G40.309 NONINTRACTABLE GENERALIZED IDIOPATHIC EPILEPSY WITHOUT STATUS EPILEPTICUS (HCC): Chronic | ICD-10-CM

## 2019-10-23 DIAGNOSIS — M17.12 PRIMARY OSTEOARTHRITIS OF LEFT KNEE: ICD-10-CM

## 2019-10-23 DIAGNOSIS — Z78.9 IMPAIRED MOBILITY AND ADLS: Primary | Chronic | ICD-10-CM

## 2019-10-23 PROCEDURE — 99309 SBSQ NF CARE MODERATE MDM 30: CPT | Performed by: FAMILY MEDICINE

## 2019-11-12 VITALS
SYSTOLIC BLOOD PRESSURE: 120 MMHG | WEIGHT: 213 LBS | OXYGEN SATURATION: 99 % | TEMPERATURE: 96.8 F | BODY MASS INDEX: 38.96 KG/M2 | RESPIRATION RATE: 16 BRPM | HEART RATE: 60 BPM | DIASTOLIC BLOOD PRESSURE: 73 MMHG

## 2019-11-20 ENCOUNTER — NURSING HOME (OUTPATIENT)
Dept: FAMILY MEDICINE CLINIC | Facility: CLINIC | Age: 64
End: 2019-11-20

## 2019-11-20 VITALS
RESPIRATION RATE: 18 BRPM | TEMPERATURE: 97.3 F | HEART RATE: 58 BPM | OXYGEN SATURATION: 93 % | WEIGHT: 214 LBS | DIASTOLIC BLOOD PRESSURE: 52 MMHG | SYSTOLIC BLOOD PRESSURE: 96 MMHG | BODY MASS INDEX: 39.14 KG/M2

## 2019-11-20 DIAGNOSIS — M17.12 PRIMARY OSTEOARTHRITIS OF LEFT KNEE: ICD-10-CM

## 2019-11-20 DIAGNOSIS — M81.0 OSTEOPOROSIS WITHOUT CURRENT PATHOLOGICAL FRACTURE, UNSPECIFIED OSTEOPOROSIS TYPE: ICD-10-CM

## 2019-11-20 DIAGNOSIS — Z74.09 IMPAIRED MOBILITY AND ADLS: Primary | Chronic | ICD-10-CM

## 2019-11-20 DIAGNOSIS — Z78.9 IMPAIRED MOBILITY AND ADLS: Primary | Chronic | ICD-10-CM

## 2019-11-20 DIAGNOSIS — M25.562 ARTHRALGIA OF LEFT KNEE: ICD-10-CM

## 2019-11-20 DIAGNOSIS — G40.309 NONINTRACTABLE GENERALIZED IDIOPATHIC EPILEPSY WITHOUT STATUS EPILEPTICUS (HCC): Chronic | ICD-10-CM

## 2019-11-20 PROCEDURE — 99309 SBSQ NF CARE MODERATE MDM 30: CPT | Performed by: FAMILY MEDICINE

## 2019-12-04 NOTE — PROGRESS NOTES
Nursing Home Progress Note        Luigi Urena DO [x]  Marlene Garcia, APRN ?  [address] [facility], [place] [address]  Phone: [phone]  Fax: [phone] Marcell Christina MD ?  Rm Tran DO ?  [address] [place] [address]  Phone: [phone]  Fax: [phone]     PATIENT NAME: Viviane Peñaloza                                                                          YOB: 1955           DATE OF SERVICE: 10/23/2019  FACILITY: []  [facility]  [] [facility]  [x]  [facility]  [] [facility]  []  Other ______________________________________________________________________     CHIEF COMPLAINT:  Chronic medical management and long-term care.      HISTORY OF PRESENT ILLNESS:   [x]  Follow Up visit for coordination of long term care issues and chronic medical management of Diagnoses and all orders for this visit:    Impaired mobility and ADLs    Primary osteoarthritis of left knee    Age-related osteoporosis without current pathological fracture    Nonintractable generalized idiopathic epilepsy without status epilepticus (CMS/HCC)    Mixed hyperlipidemia    Vitamin D deficiency          PAST MEDICAL & SURGICAL HISTORY:   Past Medical History:   Diagnosis Date   • Epilepsy (CMS/HCC)    • Fracture, clavicle     left    • Shoulder fracture, left       Past Surgical History:   Procedure Laterality Date   • OOPHORECTOMY           MEDICATIONS:  I have reviewed and reconciled the patients medication list in the patients chart at the skilled nursing facility today.      ALLERGIES:    No Known Allergies      SOCIAL HISTORY:    Social History     Socioeconomic History   • Marital status: Single     Spouse name: Not on file   • Number of children: Not on file   • Years of education: Not on file   • Highest education level: Not on file   Tobacco Use   • Smoking status: Never Smoker   • Smokeless tobacco: Never Used   Substance and Sexual Activity   • Alcohol use: No   • Drug use: No   • Sexual activity:  Defer       FAMILY HISTORY:    Family History   Problem Relation Age of Onset   • Diabetes Mother    • Heart disease Mother    • Hypertension Mother    • Hypertension Father    • Diabetes Sister    • Hypertension Sister        REVIEW OF SYSTEMS:    Review of Systems  · Appetite: Fair []   Good []   Poor []   Weight Loss []  [x]  Weight Stable   Unavoidable Weight Loss []  Tolerating Tube Feeding []    Supplements Provided []   · Constitutional: Negative for fever, chills, diaphoresis or fatigue and weight change.   · HENT: No dysphagia; no changes to vision/hearing/smell/taste; no epistaxis  · Eyes: Negative for redness and visual disturbance.   · Respiratory: Negative for shortness of breath, chest pain, cough or chest tightness.   · Cardiovascular: Negative for chest pain and palpitations.   · Gastrointestinal: Negative for abdominal distention, abdominal pain and blood in stool.   · Endocrine: Negative for cold intolerance and heat intolerance.   · Genitourinary: Negative for difficulty urinating, dysuria and frequency.Negative for hematuria   · Musculoskeletal: Chronic myalgias and arthralgias  · Integumentary: No open wounds, rash or concerning skin lesions  · Neurological: Negative for syncope, weakness and headaches.   · Hematological: Negative for adenopathy. Does not bruise/bleed easily.   · Immunological: Negative for reported allergies or immunological disorders  · Psychological: No acute behavioral changes    PHYSICAL EXAMINATION:   VITAL SIGNS:   Vitals:    11/12/19 1620   BP: 120/73   Pulse: 60   Resp: 16   Temp: 96.8 °F (36 °C)   SpO2: 99%       Physical Exam    General Appearance:  [x]  Alert   [x]  Oriented x person  [x]  No acute distress     []  Confused  []  Disoriented   []  Comatose   Head:  Atraumatic and normocephalic, without obvious abnormality.   Eyes:         PERRLA, conjunctivae and sclerae normal, no Icterus. No pallor. Extra-occular movements are within normal limits.   Ears:  Ears  appear intact with no abnormalities noted.   Throat: No oral lesions, no thrush, oral mucosa moist.   Neck: Supple, trachea midline, no thyromegaly, no carotid bruit.   Back:   No kyphoscoliosis. No tenderness to palpation.   Lungs:   Chest shape is normal. Breath sounds heard bilaterally equally.  No wheezing.  Bilateral basal crackles heard.    Heart:  Normal S1 and S2, no murmur, no gallop, no rub. No JVD.   Abdomen:   Normal bowel sounds, no masses, no organomegaly. Soft, non-tender, non-distended, no guarding.  Truncal obesity.   Extremities: Moves all extremities well, face nonpitting edema to proximal third of the tibias bilaterally, without cyanosis or clubbing.    Chronically impaired mobility.  Poor core strength and stability.  No leg length discrepancies.  Crepitance on A/P R OM of the left knee.  Mild medial and lateral joint line tenderness additionally.  Johnathan/Nguyen test negative bilaterally.  Intact dorsiflexion/plantar flexion of bilateral feet.   Pulses: Pulses palpable and equal bilaterally.   Skin: No bleeding or rash.  Generalized dry skin noted.  Age-related atrophy of skin.   Neurologic: [x] Normal speech []  Normal mental status    [x] Cranial nerves II through XII intact   [x]  No anosmia [x]  DTR 2+ [x]  Proprioception intact  [x]  No focal motor/sensory deficits      Psych/Mood:                    [x]  No acute changes []  Depressed  Urinary:                            []  Continent  []  Incontinent []  Retention  []  F/C      []  UTI w/treatment in progress         ASSESSMENT     Diagnoses and all orders for this visit:    1. Impaired mobility and ADLs (Primary)    2. Primary osteoarthritis of left knee    3. Age-related osteoporosis without current pathological fracture    4. Nonintractable generalized idiopathic epilepsy without status epilepticus (CMS/[facility])    5. Mixed hyperlipidemia    6. Vitamin D deficiency          PLAN  Continue low-cholesterol dietary intake.  Routine  surveillance labs when needed.    Continue osteoporosis treatment, routine following a vitamin D level, including continuation of vitamin D supplementation.  There are no acute pathologic fractures at this time.    No reported seizure-like activity since last visit, continue current antiepileptic medication regimen.  Routine surveillance labs when needed.    Continue to follow function of the left knee, steroid injections when needed and as long as they remain helpful in controlling her discomfort and improving her activity.    Continue supportive care for any mobility issues or transfers.    [x]  Discussed Patient in detail with nursing/staff, addressed all needs today.     [x]  Plan of Care Reviewed   []  PT/OT Reviewed   []  Order Changes  []  Discharge Plans Reviewed   []  Code Status Changes         Luigi Urena DO  10/23/2019

## 2019-12-30 NOTE — PROGRESS NOTES
Nursing Home Progress Note        Luigi Urena DO [x]  [facility]AT[facility] Tony []  [address] [place] [address]  Phone: [phone]  Fax: [phone] Marcell Christina MD []  Rm Tran DO []  [address] [place] [address]  Phone: [phone]  Fax: [phone]     PATIENT NAME: Viviane Peñaloza                                                                          YOB: 1955           DATE OF SERVICE: 11/20/2019  FACILITY: []  [facility]  [] [facility]  [x]  [facility]  [] [facility]  []  Other ______________________________________________________________________     CHIEF COMPLAINT:  Chronic medical management and long-term care/seizure activity      HISTORY OF PRESENT ILLNESS:   [x]  Follow Up visit for coordination of long term care issues and chronic medical management of Diagnoses and all orders for this visit:    Impaired mobility and ADLs    Primary osteoarthritis of left knee    Arthralgia of left knee    Nonintractable generalized idiopathic epilepsy without status epilepticus (CMS/HCC)    Osteoporosis without current pathological fracture, unspecified osteoporosis type    To new seizures occurring around hollowing, none since.      PAST MEDICAL & SURGICAL HISTORY:   Past Medical History:   Diagnosis Date   • Epilepsy (CMS/HCC)    • Fracture, clavicle     left    • Shoulder fracture, left       Past Surgical History:   Procedure Laterality Date   • OOPHORECTOMY           MEDICATIONS:  I have reviewed and reconciled the patients medication list in the patients chart at the skilled nursing facility today.      ALLERGIES:    No Known Allergies      SOCIAL HISTORY:    Social History     Socioeconomic History   • Marital status: Single     Spouse name: Not on file   • Number of children: Not on file   • Years of education: Not on file   • Highest education level: Not on file   Tobacco Use   • Smoking status: Never Smoker   • Smokeless tobacco: Never Used   Substance and  Sexual Activity   • Alcohol use: No   • Drug use: No   • Sexual activity: Defer       FAMILY HISTORY:    Family History   Problem Relation Age of Onset   • Diabetes Mother    • Heart disease Mother    • Hypertension Mother    • Hypertension Father    • Diabetes Sister    • Hypertension Sister        REVIEW OF SYSTEMS:    Review of Systems  · Appetite: Fair []   Good [x]   Poor []   Weight Loss []  [x]  Weight Stable   Unavoidable Weight Loss []  Tolerating Tube Feeding []    Supplements Provided []   · Constitutional: Negative for fever, chills, diaphoresis or fatigue and weight change.   · HENT: No dysphagia; no changes to vision/hearing/smell/taste; no epistaxis  · Eyes: Negative for redness and visual disturbance.   · Respiratory: Negative for shortness of breath, chest pain, cough or chest tightness.   · Cardiovascular: Negative for chest pain and palpitations.   · Gastrointestinal: Negative for abdominal distention, abdominal pain and blood in stool.   · Endocrine: Negative for cold intolerance and heat intolerance.   · Genitourinary: Negative for difficulty urinating, dysuria and frequency.Negative for hematuria   · Musculoskeletal: Chronic myalgias and arthralgias, particularly to the left knee.  · Integumentary: No open wounds, rash or concerning skin lesions  · Neurological: Negative for syncope, weakness and headaches.   · Hematological: Negative for adenopathy. Does not bruise/bleed easily.   · Immunological: Negative for reported allergies or immunological disorders  · Psychological: No acute behavioral changes    PHYSICAL EXAMINATION:   VITAL SIGNS:   Vitals:    11/20/19 1108   BP: 96/52   Pulse: 58   Resp: 18   Temp: 97.3 °F (36.3 °C)   SpO2: 93%       Physical Exam    General Appearance:  [x]  Alert   [x]  Oriented x person  [x]  No acute distress     []  Confused  []  Disoriented   []  Comatose   Head:  Atraumatic and normocephalic, without obvious abnormality.   Eyes:         PERRLA, conjunctivae  and sclerae normal, no Icterus. No pallor. Extra-occular movements are within normal limits.   Ears:  Ears appear intact with no abnormalities noted.   Throat: No oral lesions, no thrush, oral mucosa moist.   Neck: Supple, trachea midline, no thyromegaly, no carotid bruit.   Back:   No kyphoscoliosis. No tenderness to palpation.   Lungs:   Chest shape is normal. Breath sounds heard bilaterally equally.  No wheezing.  Bilateral basal crackles heard.    Heart:  Normal S1 and S2, no murmur, no gallop, no rub. No JVD.   Abdomen:   Normal bowel sounds, no masses, no organomegaly. Soft, non-tender, non-distended, no guarding.  Mild truncal obesity.   Extremities: Moves all extremities; without edema, cyanosis or clubbing.    Poor core strength and stability.   Requires assistance with transfers at times.  Utilizes walker for assistance with ambulation.  Medial/lateral joint line tenderness of the left knee, mild effusion noted to the medial and lateral gutters.  Quadriceps mechanism intact.  Johnathan/Nguyen test negative.  Symmetric and appropriate dorsiflexion and plantar flexion of bilateral feet.   Pulses: Pulses palpable and equal bilaterally.   Skin: No bleeding or rash.  Generalized dry skin noted.  Age-related atrophy of skin.   Neurologic: [x] Normal speech []  Normal mental status    [x] Cranial nerves II through XII intact   [x]  No anosmia [x]  DTR 2+ [x]  Proprioception intact  [x]  No focal motor/sensory deficits      Psych/Mood:                    [x]  No acute changes []  Depressed  Urinary:                            [x]  Continent  []  Incontinent []  Retention  []  F/C      []  UTI w/treatment in progress         ASSESSMENT     Diagnoses and all orders for this visit:    1. Impaired mobility and ADLs (Primary)    2. Primary osteoarthritis of left knee    3. Arthralgia of left knee    4. Nonintractable generalized idiopathic epilepsy without status epilepticus (CMS/HCC)    5. Osteoporosis without current  pathological fracture, unspecified osteoporosis type          PLAN  Continue supportive care as needed for any transfers/mobilization issues, currently doing well with walker aided assistance.    Weight is stable, no appreciable changes to her nutritional status.    I do suspect that her 2 seizure-like activity that occurred around hollowing were result of stress related to activities and numerous family members visiting.  She is experienced none since that time.  Continue current antiepileptic medications.    Surveillance labs when needed.    Pain appears well-controlled at this time concerning her polyarthralgias, in particular her left knee.  Consider Visco supplementation series if necessary.    Vital signs demonstrate a slightly lower diastolic blood pressure that I would prefer, as it would be more appropriate to remain above 60 if able.  Her other vital signs are reassuring, she is asymptomatic at this time.  Continue to follow clinically make changes to her treatment regimen if needed.    [x]  Discussed Patient in detail with nursing/staff, addressed all needs today.     [x]  Plan of Care Reviewed   []  PT/OT Reviewed   []  Order Changes  []  Discharge Plans Reviewed   []  Code Status Changes         Luigi Urena DO  11/20/2019

## 2020-01-13 ENCOUNTER — NURSING HOME (OUTPATIENT)
Dept: FAMILY MEDICINE CLINIC | Facility: CLINIC | Age: 65
End: 2020-01-13

## 2020-01-13 VITALS
RESPIRATION RATE: 16 BRPM | DIASTOLIC BLOOD PRESSURE: 60 MMHG | TEMPERATURE: 97.8 F | SYSTOLIC BLOOD PRESSURE: 101 MMHG | OXYGEN SATURATION: 95 % | HEART RATE: 62 BPM | BODY MASS INDEX: 38.59 KG/M2 | WEIGHT: 211 LBS

## 2020-01-13 DIAGNOSIS — Z78.9 IMPAIRED MOBILITY AND ADLS: Chronic | ICD-10-CM

## 2020-01-13 DIAGNOSIS — J01.00 ACUTE NON-RECURRENT MAXILLARY SINUSITIS: Primary | ICD-10-CM

## 2020-01-13 DIAGNOSIS — J02.9 ACUTE PHARYNGITIS, UNSPECIFIED ETIOLOGY: ICD-10-CM

## 2020-01-13 DIAGNOSIS — J20.9 ACUTE BRONCHITIS, UNSPECIFIED ORGANISM: ICD-10-CM

## 2020-01-13 DIAGNOSIS — Z74.09 IMPAIRED MOBILITY AND ADLS: Chronic | ICD-10-CM

## 2020-01-13 DIAGNOSIS — G40.309 NONINTRACTABLE GENERALIZED IDIOPATHIC EPILEPSY WITHOUT STATUS EPILEPTICUS (HCC): Chronic | ICD-10-CM

## 2020-01-13 PROCEDURE — 99309 SBSQ NF CARE MODERATE MDM 30: CPT | Performed by: NURSE PRACTITIONER

## 2020-01-14 RX ORDER — CLONAZEPAM 0.5 MG/1
TABLET ORAL
Qty: 45 TABLET | Refills: 5 | OUTPATIENT
Start: 2020-01-14

## 2020-01-15 NOTE — PROGRESS NOTES
Nursing Home Follow Up Note      Luigi Urena DO []   [facility]AT[facility] Tony [x]  [address] [place] [address]  Phone: [phone]  Fax: [phone] Marcell Christina MD []    Rm Tran DO []   [address] [place] [address]  Phone: [phone]  Fax: [phone]     PATIENT NAME: Viviane Peñaloza                                                                          YOB: 1955           DATE OF SERVICE: 1/13/2020  FACILITY:  [][facility]   [] [facility]   [x] [facility]   [] [facility]   [] Other ______________________________________________________________________      CHIEF COMPLAINT:  Cough, congestion, sore throat x 1 week    [facility]      HISTORY OF PRESENT ILLNESS:   Patient has complaints of sore throat, cough and congestion, for the past week. She has been taking cough medication the past couple days, and took a Neb treatment earlier, and it did help. Her symptoms are not getting better, and now she feels like she has a sinus infection.    Coordination of long term care needs and chronic conditions, epilepsy, osteoporosis, and debility     PAST MEDICAL & SURGICAL HISTORY:   Past Medical History:   Diagnosis Date   • Epilepsy (CMS/HCC)    • Fracture, clavicle     left    • Shoulder fracture, left       Past Surgical History:   Procedure Laterality Date   • OOPHORECTOMY           MEDICATIONS:  I have reviewed and reconciled the patients medication list in the patients chart at the skilled nursing facility today.      ALLERGIES:  No Known Allergies      SOCIAL HISTORY:  Social History     Socioeconomic History   • Marital status: Single     Spouse name: Not on file   • Number of children: Not on file   • Years of education: Not on file   • Highest education level: Not on file   Tobacco Use   • Smoking status: Never Smoker   • Smokeless tobacco: Never Used   Substance and Sexual Activity   • Alcohol use: No   • Drug use: No   • Sexual activity: Defer       FAMILY  HISTORY:  Family History   Problem Relation Age of Onset   • Diabetes Mother    • Heart disease Mother    • Hypertension Mother    • Hypertension Father    • Diabetes Sister    • Hypertension Sister        REVIEW OF SYSTEMS:  Review of Systems   Constitutional: Negative for activity change, appetite change, chills, diaphoresis, fatigue, fever, unexpected weight gain and unexpected weight loss.   HENT: Positive for postnasal drip, sinus pressure and sore throat. Negative for congestion, ear pain, mouth sores, nosebleeds, rhinorrhea, sneezing, swollen glands and trouble swallowing.    Eyes: Negative for blurred vision, pain, discharge, redness, itching and visual disturbance.   Respiratory: Positive for cough and wheezing. Negative for apnea, choking, chest tightness, shortness of breath and stridor.    Cardiovascular: Negative for chest pain, palpitations and leg swelling.   Gastrointestinal: Negative for abdominal distention, abdominal pain, blood in stool, constipation, diarrhea, nausea, vomiting, GERD and indigestion.   Endocrine: Negative for polydipsia and polyuria.   Genitourinary: Negative for decreased urine volume, difficulty urinating, dysuria, flank pain, frequency, hematuria, urgency and urinary incontinence.   Musculoskeletal: Positive for arthralgias and myalgias. Negative for back pain, gait problem and joint swelling.   Skin: Negative for color change, dry skin, rash and skin lesions.   Allergic/Immunologic: Negative for environmental allergies.   Neurological: Positive for seizures and weakness. Negative for dizziness, speech difficulty, memory problem and confusion.   Psychiatric/Behavioral: Negative for behavioral problems, dysphoric mood, hallucinations, sleep disturbance and depressed mood. The patient is not nervous/anxious.          PHYSICAL EXAMINATION:   VITAL SIGNS:     Vitals:    01/13/20 1426   BP: 101/60   Pulse: 62   Resp: 16   Temp: 97.8 °F (36.6 °C)   SpO2: 95%   Weight: 95.7 kg (211  lb)       Physical Exam   Constitutional: She appears well-developed and well-nourished.   HENT:   Head: Normocephalic.   Right Ear: External ear normal.   Left Ear: External ear normal.   Nose: Nose normal.   Eyes: Conjunctivae are normal.   Neck: Normal range of motion.   Cardiovascular: Regular rhythm, normal heart sounds and intact distal pulses.   Pulmonary/Chest: Effort normal and breath sounds normal. No respiratory distress. She has no wheezes. She has no rales.   Abdominal: Soft. Bowel sounds are normal. She exhibits no distension and no mass. There is no tenderness. No hernia.   Musculoskeletal: She exhibits no deformity.        Right knee: She exhibits normal range of motion, no swelling, no effusion, no ecchymosis, no deformity, no erythema and normal alignment. Tenderness found. Patellar tendon tenderness noted.   Painful ROM of left knee    Neurological: She is alert.   Skin: Skin is warm and dry. No rash noted.   Psychiatric: She has a normal mood and affect. Her behavior is normal.   Nursing note and vitals reviewed.      RECORDS REVIEW:   I have reviewed and interpreted the following lab test results  obtained at the time of the visit today: no new labs since last visit, patient on annual schedule. CMP normal    ASSESSMENT     Diagnoses and all orders for this visit:    Acute non-recurrent maxillary sinusitis    Acute bronchitis, unspecified organism    Acute pharyngitis, unspecified etiology    Nonintractable generalized idiopathic epilepsy without status epilepticus (CMS/HCC)    Impaired mobility and ADLs        PLAN  Acute sinusitis/acute bronchitis/pharyngitis  -Give Cefdinir 300 mg po bid x 7 days. Give Duo Nebs q 6 hours and Prednisone 10 mg po bid  x 5 days.Continue prn Robitussin.    Seizure disorder  -No reports of recent seizure activity. She follows Neuro.    Continue supportive care as needed for mobilization and any assistance with ADLs.       Patient was encouraged to keep me informed  of any acute changes, lack of improvement, or any new concerning symptoms.      [x]  Discussed Patient in detail with nursing/staff, addressed all needs today.     [x]  Plan of Care Reviewed   []  PT/OT Reviewed   [x]  Order Changes  []  Discharge Plans Reviewed  [x]  Advance Directive on file with Nursing Home.   [x]  POA on file with Nursing Home.   []  Code Status listed: [x]  Full Code   []  DNR     “I confirm accuracy of unchanged data/findings which have been carried forward from previous visit, as well as I have updated appropriately those that have changed.”          Marlene Garcia, APRN.

## 2020-02-12 RX ORDER — PHENOBARBITAL 64.8 MG/1
64.8 TABLET ORAL 2 TIMES DAILY
Qty: 60 TABLET | Refills: 5 | Status: SHIPPED | OUTPATIENT
Start: 2020-02-12 | End: 2020-06-22 | Stop reason: SDUPTHER

## 2020-02-12 RX ORDER — PREGABALIN 100 MG/1
100 CAPSULE ORAL 2 TIMES DAILY
Qty: 60 CAPSULE | Refills: 5 | Status: SHIPPED | OUTPATIENT
Start: 2020-02-12 | End: 2020-03-26 | Stop reason: SDUPTHER

## 2020-02-12 RX ORDER — OXYCODONE HYDROCHLORIDE 5 MG/1
5 TABLET ORAL EVERY 6 HOURS PRN
Qty: 120 TABLET | Refills: 0 | Status: SHIPPED | OUTPATIENT
Start: 2020-02-12 | End: 2020-12-23 | Stop reason: SDUPTHER

## 2020-02-26 ENCOUNTER — DOCUMENTATION (OUTPATIENT)
Dept: FAMILY MEDICINE CLINIC | Facility: CLINIC | Age: 65
End: 2020-02-26

## 2020-02-26 RX ORDER — CLONAZEPAM 0.5 MG/1
TABLET ORAL
Qty: 90 TABLET | Refills: 5 | Status: SHIPPED | OUTPATIENT
Start: 2020-02-26 | End: 2020-08-31 | Stop reason: SDUPTHER

## 2020-02-27 ENCOUNTER — NURSING HOME (OUTPATIENT)
Dept: FAMILY MEDICINE CLINIC | Facility: CLINIC | Age: 65
End: 2020-02-27

## 2020-02-27 VITALS
TEMPERATURE: 98.2 F | SYSTOLIC BLOOD PRESSURE: 126 MMHG | DIASTOLIC BLOOD PRESSURE: 95 MMHG | RESPIRATION RATE: 18 BRPM | WEIGHT: 208 LBS | HEART RATE: 57 BPM | BODY MASS INDEX: 38.04 KG/M2 | OXYGEN SATURATION: 96 %

## 2020-02-27 DIAGNOSIS — G40.309 NONINTRACTABLE GENERALIZED IDIOPATHIC EPILEPSY WITHOUT STATUS EPILEPTICUS (HCC): Primary | Chronic | ICD-10-CM

## 2020-02-27 DIAGNOSIS — Z12.39 SCREENING FOR BREAST CANCER: ICD-10-CM

## 2020-02-27 DIAGNOSIS — Z74.09 IMPAIRED MOBILITY AND ADLS: Chronic | ICD-10-CM

## 2020-02-27 DIAGNOSIS — Z78.9 IMPAIRED MOBILITY AND ADLS: Chronic | ICD-10-CM

## 2020-02-27 PROCEDURE — 99308 SBSQ NF CARE LOW MDM 20: CPT | Performed by: NURSE PRACTITIONER

## 2020-02-27 NOTE — PROGRESS NOTES
Nursing Home Follow Up Note      Luigi Urena DO  []  LATONIA Tony  []  LATONIA Mullins [x]  [address] [facility], [place] [address]  Phone: [phone]  Fax: [phone] Marcell Christina MD  []  Rm Tran DO  []  [address] [place] [address]  Phone: [phone]  Fax: [phone]     PATIENT NAME: Viviane Peñaloza                                                                          YOB: 1955           DATE OF SERVICE: 02/27/2020  FACILITY:   [] [facility]    [] [place]    [x] [facility]     [] [facility]    [] Other ______________________________________________________________________     CHIEF COMPLAINT:  Lump in left breast      HISTORY OF PRESENT ILLNESS:   Patient reports a lump in her left breast that she noticed last night when rolling over in bed. Said it moved around some. No nipple drainage. Notes she is due to her mammogram.   Does have a vagus nerve stimulator in the area of concern    REVIEW OF SYSTEMS:  Gen- No fevers, chills  CV- No chest pain, palpitations  Resp- No cough, dyspnea  GI- No N/V/D, abd pain    Otherwise ROS is negative except as mentioned in the HPI.    PAST MEDICAL & SURGICAL HISTORY:   Past Medical History:   Diagnosis Date   • Epilepsy (CMS/HCC)    • Fracture, clavicle     left    • Shoulder fracture, left       Past Surgical History:   Procedure Laterality Date   • OOPHORECTOMY           MEDICATIONS:  I have reviewed and reconciled the patients medication list in the patients chart at the [facility] today.      ALLERGIES:  I have reviewed allergies on file at the [facility]  No Known Allergies      SOCIAL HISTORY:  Social History     Socioeconomic History   • Marital status: Single     Spouse name: Not on file   • Number of children: Not on file   • Years of education: Not on file   • Highest education level: Not on file   Tobacco Use   • Smoking status: Never Smoker   • Smokeless tobacco: Never Used    Substance and Sexual Activity   • Alcohol use: No   • Drug use: No   • Sexual activity: Defer       PHYSICAL EXAMINATION:   Vitals:    02/27/20 1350   BP: 126/95   Pulse: 57   Resp: 18   Temp: 98.2 °F (36.8 °C)   SpO2: 96%   Weight: 94.3 kg (208 lb)     Constitutional: No acute distress, awake, alert  HENT: NCAT, mucous membranes moist  Respiratory: Clear to auscultation bilaterally, respiratory effort normal   Breast: left breast exam performed. Without abnormalities. Vagus nerve stimulator noted  Cardiovascular: RRR, no murmurs, rubs, or gallops  Gastrointestinal: Positive bowel sounds, soft, nontender, nondistended  Musculoskeletal: No bilateral ankle edema, PPP  Psychiatric: Appropriate affect, cooperative  Neurologic: alert and interactive, speech clear  Skin: No rashes    RECORDS REVIEW:   I have reviewed labs available at time of visit.     ASSESSMENT     Diagnoses and all orders for this visit:    Nonintractable generalized idiopathic epilepsy without status epilepticus (CMS/HCC)    Impaired mobility and ADLs    Screening for breast cancer        PLAN  -- breast exam reassuring today. Discussed the area of abnormality that she is feeling is her vagus nerve stimulator  -- screening mammogram ordered  -- staff to continue to assist with ADL's, medications, and overall care    [x]  Discussed Patient in detail with nursing/staff, addressed all needs today.     [x]  Plan of Care Reviewed   []  PT/OT Reviewed   [x]  Order Changes  []  Discharge Plans Reviewed  [x]  Advance Directive on file with Nursing Home.   [x]  POA on file with Nursing Home.   [x]  Code Status: I have reviewed the CODE STATUS on file at the skilled nursing facility       LATONIA Mullins.

## 2020-03-26 DIAGNOSIS — G62.9 NEUROPATHY: Primary | ICD-10-CM

## 2020-03-26 RX ORDER — PREGABALIN 100 MG/1
100 CAPSULE ORAL 2 TIMES DAILY
Qty: 60 CAPSULE | Refills: 5 | Status: SHIPPED | OUTPATIENT
Start: 2020-03-26 | End: 2020-09-01 | Stop reason: SDUPTHER

## 2020-05-06 ENCOUNTER — NURSING HOME (OUTPATIENT)
Dept: FAMILY MEDICINE CLINIC | Facility: CLINIC | Age: 65
End: 2020-05-06

## 2020-05-06 VITALS
DIASTOLIC BLOOD PRESSURE: 77 MMHG | SYSTOLIC BLOOD PRESSURE: 119 MMHG | RESPIRATION RATE: 20 BRPM | TEMPERATURE: 98 F | HEART RATE: 63 BPM | BODY MASS INDEX: 37.86 KG/M2 | OXYGEN SATURATION: 97 % | WEIGHT: 207 LBS

## 2020-05-06 DIAGNOSIS — Z78.9 IMPAIRED MOBILITY AND ADLS: Chronic | ICD-10-CM

## 2020-05-06 DIAGNOSIS — Z13.31 DEPRESSION SCREENING: ICD-10-CM

## 2020-05-06 DIAGNOSIS — Z74.09 IMPAIRED MOBILITY AND ADLS: Chronic | ICD-10-CM

## 2020-05-06 PROCEDURE — 99308 SBSQ NF CARE LOW MDM 20: CPT | Performed by: NURSE PRACTITIONER

## 2020-05-08 NOTE — PROGRESS NOTES
Nursing Home Follow Up Note      Luigi Urena DO []   LATONIA Tony [x]  [address] [place] [address]  Phone: [phone]  Fax: [phone] Marcell Christina MD []    Rm Tran DO []   [address] [place] [address]  Phone: [phone]  Fax: [phone]     PATIENT NAME: Viviane Peñaloza                                                                          YOB: 1955           DATE OF SERVICE: 5/6/2020  FACILITY:  [][place]   [] [place]   [x] [facility]   [] [facility]   [] Other ______________________________________________________________________      CHIEF COMPLAINT:  Follow up to screen for depression      HISTORY OF PRESENT ILLNESS:   Visit to assess for s/s depression, due to visitor restrictions and being isolated to room. Patient reports she is doing fine and understands why things have to be how they are. She and her roommate have each other, they talk, play games that are possible, at 6 feet apart and work on projects.     Coordination of long term care needs and chronic conditions, epilepsy, osteoporosis, and debility     PAST MEDICAL & SURGICAL HISTORY:   Past Medical History:   Diagnosis Date   • Epilepsy (CMS/HCC)    • Fracture, clavicle     left    • Shoulder fracture, left       Past Surgical History:   Procedure Laterality Date   • OOPHORECTOMY           MEDICATIONS:  I have reviewed and reconciled the patients medication list in the patients chart at the skilled nursing facility today.      ALLERGIES:  No Known Allergies      SOCIAL HISTORY:  Social History     Socioeconomic History   • Marital status: Single     Spouse name: Not on file   • Number of children: Not on file   • Years of education: Not on file   • Highest education level: Not on file   Tobacco Use   • Smoking status: Never Smoker   • Smokeless tobacco: Never Used   Substance and Sexual Activity   • Alcohol use: No   • Drug use: No   • Sexual activity: Defer       FAMILY  HISTORY:  Family History   Problem Relation Age of Onset   • Diabetes Mother    • Heart disease Mother    • Hypertension Mother    • Hypertension Father    • Diabetes Sister    • Hypertension Sister        REVIEW OF SYSTEMS:  Review of Systems   Constitutional: Negative for activity change, appetite change, chills, diaphoresis, fatigue, fever, unexpected weight gain and unexpected weight loss.   HENT: Positive for postnasal drip, sinus pressure and sore throat. Negative for congestion, ear pain, mouth sores, nosebleeds, rhinorrhea, sneezing, swollen glands and trouble swallowing.    Eyes: Negative for blurred vision, pain, discharge, redness, itching and visual disturbance.   Respiratory: Positive for cough and wheezing. Negative for apnea, choking, chest tightness, shortness of breath and stridor.    Cardiovascular: Negative for chest pain, palpitations and leg swelling.   Gastrointestinal: Negative for abdominal distention, abdominal pain, blood in stool, constipation, diarrhea, nausea, vomiting, GERD and indigestion.   Endocrine: Negative for polydipsia and polyuria.   Genitourinary: Negative for decreased urine volume, difficulty urinating, dysuria, flank pain, frequency, hematuria, urgency and urinary incontinence.   Musculoskeletal: Positive for arthralgias and myalgias. Negative for back pain, gait problem and joint swelling.   Skin: Negative for color change, dry skin, rash and skin lesions.   Allergic/Immunologic: Negative for environmental allergies.   Neurological: Positive for seizures and weakness. Negative for dizziness, speech difficulty, memory problem and confusion.   Psychiatric/Behavioral: Negative for behavioral problems, dysphoric mood, hallucinations, sleep disturbance and depressed mood. The patient is not nervous/anxious.          PHYSICAL EXAMINATION:   VITAL SIGNS:     Vitals:    05/06/20 0946   BP: 119/77   Pulse: 63   Resp: 20   Temp: 98 °F (36.7 °C)   SpO2: 97%   Weight: 93.9 kg (207 lb)        Physical Exam   Constitutional: She appears well-developed and well-nourished.   HENT:   Head: Normocephalic.   Right Ear: External ear normal.   Left Ear: External ear normal.   Nose: Nose normal.   Eyes: Conjunctivae are normal.   Neck: Normal range of motion.   Cardiovascular: Regular rhythm, normal heart sounds and intact distal pulses.   Pulmonary/Chest: Effort normal and breath sounds normal. No respiratory distress. She has no wheezes. She has no rales.   Abdominal: Soft. Bowel sounds are normal. She exhibits no distension and no mass. There is no tenderness. No hernia.   Musculoskeletal: She exhibits no deformity.        Right knee: She exhibits normal range of motion, no swelling, no effusion, no ecchymosis, no deformity, no erythema and normal alignment. Tenderness found. Patellar tendon tenderness noted.   Painful ROM of left knee    Neurological: She is alert.   Skin: Skin is warm and dry. No rash noted.   Psychiatric: She has a normal mood and affect. Her behavior is normal.   Nursing note and vitals reviewed.      RECORDS REVIEW:   I have reviewed and interpreted the following lab test results  obtained at the time of the visit today: no new labs since last visit, patient on annual schedule. CMP normal    ASSESSMENT     Diagnoses and all orders for this visit:    Depression screening    Impaired mobility and ADLs        PLAN  Depression screening  -Patient has no s/s depression and denies being depressed. Will c/t monitor.     Continue supportive care as needed for mobilization and any assistance with ADLs.       Patient was encouraged to keep me informed of any acute changes, lack of improvement, or any new concerning symptoms.      [x]  Discussed Patient in detail with nursing/staff, addressed all needs today.     [x]  Plan of Care Reviewed   []  PT/OT Reviewed   [x]  Order Changes  []  Discharge Plans Reviewed  [x]  Advance Directive on file with Nursing Home.   [x]  POA on file with Nursing  Home.   []  Code Status listed: [x]  Full Code   []  DNR     “I confirm accuracy of unchanged data/findings which have been carried forward from previous visit, as well as I have updated appropriately those that have changed.”            Marlene Garcia, APRN.

## 2020-06-17 ENCOUNTER — NURSING HOME (OUTPATIENT)
Dept: FAMILY MEDICINE CLINIC | Facility: CLINIC | Age: 65
End: 2020-06-17

## 2020-06-17 DIAGNOSIS — M25.562 ARTHRALGIA OF LEFT KNEE: ICD-10-CM

## 2020-06-17 DIAGNOSIS — Z74.09 IMPAIRED MOBILITY AND ADLS: Primary | Chronic | ICD-10-CM

## 2020-06-17 DIAGNOSIS — M17.12 PRIMARY OSTEOARTHRITIS OF LEFT KNEE: ICD-10-CM

## 2020-06-17 DIAGNOSIS — G40.309 NONINTRACTABLE GENERALIZED IDIOPATHIC EPILEPSY WITHOUT STATUS EPILEPTICUS (HCC): Chronic | ICD-10-CM

## 2020-06-17 DIAGNOSIS — Z78.9 IMPAIRED MOBILITY AND ADLS: Primary | Chronic | ICD-10-CM

## 2020-06-17 DIAGNOSIS — E78.2 MIXED HYPERLIPIDEMIA: ICD-10-CM

## 2020-06-17 PROCEDURE — 99309 SBSQ NF CARE MODERATE MDM 30: CPT | Performed by: FAMILY MEDICINE

## 2020-06-18 VITALS
SYSTOLIC BLOOD PRESSURE: 140 MMHG | WEIGHT: 205 LBS | RESPIRATION RATE: 20 BRPM | OXYGEN SATURATION: 94 % | TEMPERATURE: 98 F | BODY MASS INDEX: 37.49 KG/M2 | DIASTOLIC BLOOD PRESSURE: 72 MMHG | HEART RATE: 61 BPM

## 2020-06-22 RX ORDER — PHENOBARBITAL 64.8 MG/1
64.8 TABLET ORAL 2 TIMES DAILY
Qty: 60 TABLET | Refills: 5 | Status: SHIPPED | OUTPATIENT
Start: 2020-06-22 | End: 2020-12-27

## 2020-07-22 NOTE — PROGRESS NOTES
Nursing Home Progress Note        Luigi Urena DO [x]  LATONIA Tony []  [address] [place] [address]  Phone: [phone]  Fax: [phone] Marcell Christina MD []  Rm Tran DO []  [address] [place] [address]  Phone: [phone]  Fax: [phone]     PATIENT NAME: Viviane Peñaloza                                                                          YOB: 1955           DATE OF SERVICE: 6/17/2020  FACILITY: []  [place]  [] [place]  [x]  [facility]  [] [facility]  []  Other ______________________________________________________________________     CHIEF COMPLAINT:  Chronic medical management and long-term care/seizure activity      HISTORY OF PRESENT ILLNESS:   [x]  Follow Up visit for coordination of long term care issues and chronic medical management of Diagnoses and all orders for this visit:    Impaired mobility and ADLs    Nonintractable generalized idiopathic epilepsy without status epilepticus (CMS/HCC)    Primary osteoarthritis of left knee    Arthralgia of left knee    Mixed hyperlipidemia    Patient continues to respond and be receptive to supportive care for mobilization/transfer assistance, as well as ADLs, although continues to be interactive with staff and assist.    No reports of any seizure-like activity since last visit.    Continues to have significant discomfort to her left knee, does limit her mobilization/transfers at times.  Denies any new trauma, no reports of any falls or injuries.    Vital signs of demonstrated hemodynamic stability.      PAST MEDICAL & SURGICAL HISTORY:   Past Medical History:   Diagnosis Date   • Epilepsy (CMS/HCC)    • Fracture, clavicle     left    • Shoulder fracture, left       Past Surgical History:   Procedure Laterality Date   • OOPHORECTOMY           MEDICATIONS:  I have reviewed and reconciled the patients medication list in the patients chart at the skilled nursing facility today.      ALLERGIES:    No  Known Allergies      SOCIAL HISTORY:    Social History     Socioeconomic History   • Marital status: Single     Spouse name: Not on file   • Number of children: Not on file   • Years of education: Not on file   • Highest education level: Not on file   Tobacco Use   • Smoking status: Never Smoker   • Smokeless tobacco: Never Used   Substance and Sexual Activity   • Alcohol use: No   • Drug use: No   • Sexual activity: Defer       FAMILY HISTORY:    Family History   Problem Relation Age of Onset   • Diabetes Mother    • Heart disease Mother    • Hypertension Mother    • Hypertension Father    • Diabetes Sister    • Hypertension Sister        REVIEW OF SYSTEMS:    Review of Systems  · Appetite: Fair []   Good [x]   Poor []   Weight Loss []  [x]  Weight Stable   Unavoidable Weight Loss []  Tolerating Tube Feeding []    Supplements Provided []   · Constitutional: Negative for fever, chills, diaphoresis or fatigue and weight change.   · HENT: No dysphagia; no changes to vision/hearing/smell/taste; no epistaxis  · Eyes: Negative for redness and visual disturbance.   · Respiratory: Negative for shortness of breath, chest pain, cough or chest tightness.   · Cardiovascular: Negative for chest pain and palpitations.   · Gastrointestinal: Negative for abdominal distention, abdominal pain and blood in stool.   · Endocrine: Negative for cold intolerance and heat intolerance.   · Genitourinary: Negative for difficulty urinating, dysuria and frequency.Negative for hematuria   · Musculoskeletal: Chronic myalgias and arthralgias, worse as above to the left knee.  · Integumentary: No open wounds, rash or concerning skin lesions  · Neurological: Negative for syncope, weakness and headaches.   · Hematological: Negative for adenopathy. Does not bruise/bleed easily.   · Immunological: Negative for reported allergies or immunological disorders  · Psychological: No acute behavioral changes    PHYSICAL EXAMINATION:   VITAL SIGNS:   Vitals:     06/18/20 0834   BP: 140/72   Pulse: 61   Resp: 20   Temp: 98 °F (36.7 °C)   SpO2: 94%       Physical Exam    General Appearance:  [x]  Alert   [x]  Oriented x person  [x]  No acute distress     []  Confused  []  Disoriented   []  Comatose   Head:  Atraumatic and normocephalic, without obvious abnormality.   Eyes:         PERRLA, conjunctivae and sclerae normal, no Icterus. No pallor. Extra-occular movements are within normal limits.   Ears:  Ears appear intact with no abnormalities noted.   Throat: No oral lesions, no thrush, oral mucosa moist.   Neck: Supple, trachea midline, no thyromegaly, no carotid bruit.   Back:   No kyphoscoliosis. No tenderness to palpation.   Lungs:   Chest shape is normal. Breath sounds heard bilaterally equally.  No wheezing.  Bilateral basal crackles heard.    Heart:  Normal S1 and S2, no murmur, no gallop, no rub. No JVD.   Abdomen:   Normal bowel sounds, no masses, no organomegaly. Soft, non-tender, non-distended, no guarding.  Mild truncal obesity.   Extremities: Moves all extremities; without edema, cyanosis or clubbing.    Poor core strength and stability.   Requires assistance with transfers at times.  Utilizes walker for assistance with ambulation.  Medial/lateral joint line tenderness of the left knee, mild effusion noted to the medial and lateral gutters.  Quadriceps mechanism intact.  Johnathan/Nguyen test negative.  Symmetric and appropriate dorsiflexion and plantar flexion of bilateral feet.   Pulses: Pulses palpable and equal bilaterally.   Skin: No bleeding or rash.  Generalized dry skin noted.  Age-related atrophy of skin.   Neurologic: [x] Normal speech []  Normal mental status    [x] Cranial nerves II through XII intact   [x]  No anosmia [x]  DTR 2+ [x]  Proprioception intact  [x]  No focal motor/sensory deficits      Psych/Mood:                    [x]  No acute changes []  Depressed  Urinary:                            [x]  Continent  []  Incontinent []  Retention  []   F/C      []  UTI w/treatment in progress         ASSESSMENT     Diagnoses and all orders for this visit:    1. Impaired mobility and ADLs (Primary)    2. Nonintractable generalized idiopathic epilepsy without status epilepticus (CMS/HCC)    3. Primary osteoarthritis of left knee    4. Arthralgia of left knee    5. Mixed hyperlipidemia          PLAN  Plan continuation of supportive care for any mobilization/transfer assistance, as well as ADLs as needed.  Continue to follow her nutritional intake, I stressed the importance of maintaining appropriate hydration status based    Continue current antiepileptic treatment regimen.    I do suspect patient was benefit from a potential steroid injection to her left knee, consideration will be made should her symptoms not improve, or if they worsen.    Continue with local cholesterol dietary intake.    Surveillance labs when needed.    [x]  Discussed Patient in detail with nursing/staff, addressed all needs today.     [x]  Plan of Care Reviewed   []  PT/OT Reviewed   []  Order Changes  []  Discharge Plans Reviewed   []  Code Status Changes    I have reviewed and updated all copied forward information, as appropriate.  I attest to the accuracy and relevance of any unchanged information.       Luigi Urena DO  6/17/2020

## 2020-07-29 ENCOUNTER — NURSING HOME (OUTPATIENT)
Dept: FAMILY MEDICINE CLINIC | Facility: CLINIC | Age: 65
End: 2020-07-29

## 2020-07-29 VITALS
TEMPERATURE: 98.6 F | SYSTOLIC BLOOD PRESSURE: 119 MMHG | RESPIRATION RATE: 16 BRPM | WEIGHT: 204 LBS | OXYGEN SATURATION: 93 % | HEART RATE: 68 BPM | DIASTOLIC BLOOD PRESSURE: 73 MMHG | BODY MASS INDEX: 37.31 KG/M2

## 2020-07-29 DIAGNOSIS — M17.12 PRIMARY OSTEOARTHRITIS OF LEFT KNEE: ICD-10-CM

## 2020-07-29 DIAGNOSIS — Z78.9 IMPAIRED MOBILITY AND ADLS: Chronic | ICD-10-CM

## 2020-07-29 DIAGNOSIS — M25.562 ARTHRALGIA OF LEFT KNEE: ICD-10-CM

## 2020-07-29 DIAGNOSIS — Z74.09 IMPAIRED MOBILITY AND ADLS: Chronic | ICD-10-CM

## 2020-07-29 PROCEDURE — 99308 SBSQ NF CARE LOW MDM 20: CPT | Performed by: NURSE PRACTITIONER

## 2020-07-30 NOTE — PROGRESS NOTES
Nursing Home Follow Up Note      Luigi Urena DO []   LATONIA Tony [x]  [address] [place] [address]  Phone: [phone]  Fax: [phone] Marcell Christina MD []    Rm Tran DO []   [address] [place] [address]  Phone: [phone]  Fax: [phone]     PATIENT NAME: Viviane Peñaloza                                                                          YOB: 1955           DATE OF SERVICE: 7/29/2020  FACILITY:  [][place]   [] [place]   [x] [facility]   [] [facility]   [] Other ______________________________________________________________________      CHIEF COMPLAINT:  Left knee pain      HISTORY OF PRESENT ILLNESS:   Patient is history of chronic left knee pain but reports it is been hurting more the last several weeks.  She would like to have steroid injections in the knee and if it is time.  Pain medication does help.    Coordination of long term care needs and chronic conditions, epilepsy, osteoporosis, and debility     PAST MEDICAL & SURGICAL HISTORY:   Past Medical History:   Diagnosis Date   • Epilepsy (CMS/HCC)    • Fracture, clavicle     left    • Shoulder fracture, left       Past Surgical History:   Procedure Laterality Date   • OOPHORECTOMY           MEDICATIONS:  I have reviewed and reconciled the patients medication list in the patients chart at the skilled nursing facility today.      ALLERGIES:  No Known Allergies      SOCIAL HISTORY:  Social History     Socioeconomic History   • Marital status: Single     Spouse name: Not on file   • Number of children: Not on file   • Years of education: Not on file   • Highest education level: Not on file   Tobacco Use   • Smoking status: Never Smoker   • Smokeless tobacco: Never Used   Substance and Sexual Activity   • Alcohol use: No   • Drug use: No   • Sexual activity: Defer       FAMILY HISTORY:  Family History   Problem Relation Age of Onset   • Diabetes Mother    • Heart disease Mother    •  Hypertension Mother    • Hypertension Father    • Diabetes Sister    • Hypertension Sister        REVIEW OF SYSTEMS:  Review of Systems   Constitutional: Negative for activity change, appetite change, chills, diaphoresis, fatigue, fever, unexpected weight gain and unexpected weight loss.   HENT: Negative for congestion, ear pain, mouth sores, nosebleeds, postnasal drip, rhinorrhea, sinus pressure, sneezing, sore throat, swollen glands and trouble swallowing.    Eyes: Negative for blurred vision, pain, discharge, redness, itching and visual disturbance.   Respiratory: Negative for apnea, cough, choking, chest tightness, shortness of breath, wheezing and stridor.    Cardiovascular: Negative for chest pain, palpitations and leg swelling.   Gastrointestinal: Negative for abdominal distention, abdominal pain, blood in stool, constipation, diarrhea, nausea, vomiting, GERD and indigestion.   Endocrine: Negative for polydipsia and polyuria.   Genitourinary: Negative for decreased urine volume, difficulty urinating, dysuria, flank pain, frequency, hematuria, urgency and urinary incontinence.   Musculoskeletal: Positive for arthralgias, gait problem and myalgias. Negative for back pain and joint swelling.   Skin: Negative for color change, dry skin, rash and skin lesions.   Allergic/Immunologic: Negative for environmental allergies.   Neurological: Positive for seizures and weakness. Negative for dizziness, speech difficulty, memory problem and confusion.   Psychiatric/Behavioral: Negative for behavioral problems, dysphoric mood, hallucinations, sleep disturbance and depressed mood. The patient is not nervous/anxious.          PHYSICAL EXAMINATION:   VITAL SIGNS:     Vitals:    07/29/20 1019   BP: 119/73   Pulse: 68   Resp: 16   Temp: 98.6 °F (37 °C)   SpO2: 93%   Weight: 92.5 kg (204 lb)       Physical Exam   Constitutional: She appears well-developed and well-nourished.   HENT:   Head: Normocephalic.   Right Ear: External  ear normal.   Left Ear: External ear normal.   Nose: Nose normal.   Eyes: Conjunctivae are normal.   Neck: Normal range of motion.   Cardiovascular: Regular rhythm, normal heart sounds and intact distal pulses.   Pulmonary/Chest: Effort normal and breath sounds normal. No respiratory distress. She has no wheezes. She has no rales.   Abdominal: Soft. Bowel sounds are normal. She exhibits no distension and no mass. There is no tenderness. No hernia.   Musculoskeletal: She exhibits no deformity.        Right knee: She exhibits normal range of motion, no swelling, no effusion, no ecchymosis, no deformity, no erythema and normal alignment. No patellar tendon tenderness noted.        Left knee: She exhibits decreased range of motion. Tenderness found.   Painful ROM of left knee    Neurological: She is alert.   Skin: Skin is warm and dry. No rash noted.   Psychiatric: She has a normal mood and affect. Her behavior is normal.   Nursing note and vitals reviewed.      RECORDS REVIEW:   I have reviewed and interpreted the following lab test results  obtained at the time of the visit today: no new labs since last visit, patient on annual schedule. CMP normal    ASSESSMENT     Diagnoses and all orders for this visit:    Arthralgia of left knee    Primary osteoarthritis of left knee    Impaired mobility and ADLs        PLAN    Arthralgia left knee/arthritis left knee  -Dr. Urena to do steroid injection today tomorrow.  We will continue to monitor follow-up with patient.    Continue supportive care as needed for mobilization and any assistance with ADLs.       Patient was encouraged to keep me informed of any acute changes, lack of improvement, or any new concerning symptoms.      [x]  Discussed Patient in detail with nursing/staff, addressed all needs today.     [x]  Plan of Care Reviewed   []  PT/OT Reviewed   [x]  Order Changes  []  Discharge Plans Reviewed  [x]  Advance Directive on file with Nursing Home.   [x]  POA on file  with Nursing Home.   []  Code Status listed: [x]  Full Code   []  DNR     “I confirm accuracy of unchanged data/findings which have been carried forward from previous visit, as well as I have updated appropriately those that have changed.”              Marlene Garcia, APRN.

## 2020-08-19 ENCOUNTER — NURSING HOME (OUTPATIENT)
Dept: FAMILY MEDICINE CLINIC | Facility: CLINIC | Age: 65
End: 2020-08-19

## 2020-08-19 VITALS
DIASTOLIC BLOOD PRESSURE: 72 MMHG | HEART RATE: 74 BPM | BODY MASS INDEX: 37.49 KG/M2 | TEMPERATURE: 98.2 F | RESPIRATION RATE: 20 BRPM | WEIGHT: 205 LBS | OXYGEN SATURATION: 96 % | SYSTOLIC BLOOD PRESSURE: 129 MMHG

## 2020-08-19 DIAGNOSIS — Z74.09 IMPAIRED MOBILITY AND ADLS: Primary | Chronic | ICD-10-CM

## 2020-08-19 DIAGNOSIS — M17.12 PRIMARY OSTEOARTHRITIS OF LEFT KNEE: ICD-10-CM

## 2020-08-19 DIAGNOSIS — M25.562 ARTHRALGIA OF LEFT KNEE: ICD-10-CM

## 2020-08-19 DIAGNOSIS — M81.0 OSTEOPOROSIS WITHOUT CURRENT PATHOLOGICAL FRACTURE, UNSPECIFIED OSTEOPOROSIS TYPE: ICD-10-CM

## 2020-08-19 DIAGNOSIS — G40.309 NONINTRACTABLE GENERALIZED IDIOPATHIC EPILEPSY WITHOUT STATUS EPILEPTICUS (HCC): Chronic | ICD-10-CM

## 2020-08-19 DIAGNOSIS — Z78.9 IMPAIRED MOBILITY AND ADLS: Primary | Chronic | ICD-10-CM

## 2020-08-19 DIAGNOSIS — E55.9 VITAMIN D DEFICIENCY: ICD-10-CM

## 2020-08-19 PROBLEM — S82.132D: Status: RESOLVED | Noted: 2018-07-09 | Resolved: 2020-08-19

## 2020-08-19 PROCEDURE — 99309 SBSQ NF CARE MODERATE MDM 30: CPT | Performed by: FAMILY MEDICINE

## 2020-08-19 PROCEDURE — 20610 DRAIN/INJ JOINT/BURSA W/O US: CPT | Performed by: FAMILY MEDICINE

## 2020-08-19 RX ADMIN — BETAMETHASONE SODIUM PHOSPHATE AND BETAMETHASONE ACETATE 12 MG: 3; 3 INJECTION, SUSPENSION INTRA-ARTICULAR; INTRALESIONAL; INTRAMUSCULAR; SOFT TISSUE at 10:23

## 2020-08-19 RX ADMIN — LIDOCAINE HYDROCHLORIDE 1 ML: 10 INJECTION, SOLUTION INFILTRATION; PERINEURAL at 10:23

## 2020-08-31 RX ORDER — CLONAZEPAM 0.5 MG/1
TABLET ORAL
Qty: 90 TABLET | Refills: 5 | Status: SHIPPED | OUTPATIENT
Start: 2020-08-31 | End: 2021-02-01 | Stop reason: SDUPTHER

## 2020-09-01 DIAGNOSIS — G62.9 NEUROPATHY: ICD-10-CM

## 2020-09-01 RX ORDER — PREGABALIN 100 MG/1
100 CAPSULE ORAL 2 TIMES DAILY
Qty: 60 CAPSULE | Refills: 5 | Status: SHIPPED | OUTPATIENT
Start: 2020-09-01 | End: 2021-08-06 | Stop reason: SDUPTHER

## 2020-09-01 NOTE — TELEPHONE ENCOUNTER
[facility] REFILL REQUEST FOR LYRICA 100 MG.    DIRECTIONS: TAKE 1 TABLET PO BID.

## 2020-09-09 RX ORDER — LIDOCAINE HYDROCHLORIDE 10 MG/ML
1 INJECTION, SOLUTION INFILTRATION; PERINEURAL
Status: COMPLETED | OUTPATIENT
Start: 2020-08-19 | End: 2020-08-19

## 2020-09-09 RX ORDER — BETAMETHASONE SODIUM PHOSPHATE AND BETAMETHASONE ACETATE 3; 3 MG/ML; MG/ML
12 INJECTION, SUSPENSION INTRA-ARTICULAR; INTRALESIONAL; INTRAMUSCULAR; SOFT TISSUE
Status: COMPLETED | OUTPATIENT
Start: 2020-08-19 | End: 2020-08-19

## 2020-09-16 ENCOUNTER — NURSING HOME (OUTPATIENT)
Dept: FAMILY MEDICINE CLINIC | Facility: CLINIC | Age: 65
End: 2020-09-16

## 2020-09-16 DIAGNOSIS — Z74.09 IMPAIRED MOBILITY AND ADLS: Primary | Chronic | ICD-10-CM

## 2020-09-16 DIAGNOSIS — E78.2 MIXED HYPERLIPIDEMIA: ICD-10-CM

## 2020-09-16 DIAGNOSIS — M81.0 OSTEOPOROSIS WITHOUT CURRENT PATHOLOGICAL FRACTURE, UNSPECIFIED OSTEOPOROSIS TYPE: ICD-10-CM

## 2020-09-16 DIAGNOSIS — Z78.9 IMPAIRED MOBILITY AND ADLS: Primary | Chronic | ICD-10-CM

## 2020-09-16 DIAGNOSIS — M17.0 PRIMARY OSTEOARTHRITIS OF BOTH KNEES: ICD-10-CM

## 2020-09-16 DIAGNOSIS — G40.309 NONINTRACTABLE GENERALIZED IDIOPATHIC EPILEPSY WITHOUT STATUS EPILEPTICUS (HCC): Chronic | ICD-10-CM

## 2020-09-16 PROCEDURE — 99309 SBSQ NF CARE MODERATE MDM 30: CPT | Performed by: FAMILY MEDICINE

## 2020-09-17 VITALS
RESPIRATION RATE: 20 BRPM | TEMPERATURE: 97.7 F | HEART RATE: 59 BPM | OXYGEN SATURATION: 96 % | DIASTOLIC BLOOD PRESSURE: 60 MMHG | WEIGHT: 203 LBS | BODY MASS INDEX: 37.13 KG/M2 | SYSTOLIC BLOOD PRESSURE: 104 MMHG

## 2020-09-23 PROBLEM — M17.0 PRIMARY OSTEOARTHRITIS OF BOTH KNEES: Status: ACTIVE | Noted: 2020-09-23

## 2020-09-23 NOTE — PROGRESS NOTES
Nursing Home Progress Note        Luigi Urena DO [x]  LATONIA Tony []  [address] [place] [address]  Phone: [phone]  Fax: [phone] Marcell Christina MD []  Rm Tran DO []  [address] [place] [address]  Phone: [phone]  Fax: [phone]     PATIENT NAME: Viviane Peñaloza                                                                          YOB: 1955           DATE OF SERVICE: 9/16/2020  FACILITY: []  [facility]  [] [facility]  [x]  [facility]  [] [facility]  []  Other ______________________________________________________________________     CHIEF COMPLAINT:  Chronic medical management and long-term care/seizure activity      HISTORY OF PRESENT ILLNESS:   [x]  Follow Up visit for coordination of long term care issues and chronic medical management of Diagnoses and all orders for this visit:    Impaired mobility and ADLs    Nonintractable generalized idiopathic epilepsy without status epilepticus (CMS/HCC)    Mixed hyperlipidemia    Osteoporosis without current pathological fracture, unspecified osteoporosis type    Primary osteoarthritis of both knees    Patient reports receptiveness to supportive care for mobilization/transfer assistance, she is able to bear weight independently at times, no reports of any falls or injuries.  Nutritionally she has done well, no intolerance or significant decline overall.    No reports of any seizure-like activity.    No new falls or injuries, tolerating all meds without difficulty.    Patient does have chronic bilateral knee pain, significantly improved to her left knee since recent joint injection.      PAST MEDICAL & SURGICAL HISTORY:   Past Medical History:   Diagnosis Date   • Epilepsy (CMS/HCC)    • Fracture, clavicle     left    • Shoulder fracture, left       Past Surgical History:   Procedure Laterality Date   • OOPHORECTOMY           MEDICATIONS:  I have reviewed and reconciled the patients medication list in  the patients chart at the skilled nursing facility today.      ALLERGIES:    No Known Allergies      SOCIAL HISTORY:    Social History     Socioeconomic History   • Marital status: Single     Spouse name: Not on file   • Number of children: Not on file   • Years of education: Not on file   • Highest education level: Not on file   Tobacco Use   • Smoking status: Never Smoker   • Smokeless tobacco: Never Used   Substance and Sexual Activity   • Alcohol use: No   • Drug use: No   • Sexual activity: Defer       FAMILY HISTORY:    Family History   Problem Relation Age of Onset   • Diabetes Mother    • Heart disease Mother    • Hypertension Mother    • Hypertension Father    • Diabetes Sister    • Hypertension Sister        REVIEW OF SYSTEMS:    Review of Systems  · Appetite: Fair []   Good [x]   Poor []   Weight Loss []  [x]  Weight Stable   Unavoidable Weight Loss []  Tolerating Tube Feeding []    Supplements Provided []   · Constitutional: Negative for fever, chills, diaphoresis or fatigue and weight change.   · HENT: No dysphagia; no changes to vision/hearing/smell/taste; no epistaxis  · Eyes: Negative for redness and visual disturbance.   · Respiratory: Negative for shortness of breath, chest pain, cough or chest tightness.   · Cardiovascular: Negative for chest pain and palpitations.   · Gastrointestinal: Negative for abdominal distention, abdominal pain and blood in stool.   · Endocrine: Negative for cold intolerance and heat intolerance.   · Genitourinary: Negative for difficulty urinating, dysuria and frequency.Negative for hematuria   · Musculoskeletal: Chronic myalgias and arthralgias.  · Integumentary: No open wounds, rash or concerning skin lesions  · Neurological: Negative for syncope, weakness and headaches.   · Hematological: Negative for adenopathy. Does not bruise/bleed easily.   · Immunological: Negative for reported allergies or immunological disorders  · Psychological: No acute behavioral  changes    PHYSICAL EXAMINATION:   VITAL SIGNS:   Vitals:    09/17/20 1614   BP: 104/60   Pulse: 59   Resp: 20   Temp: 97.7 °F (36.5 °C)   SpO2: 96%       Physical Exam      General Appearance:  [x]  Alert   [x]  Oriented x person  [x]  No acute distress     []  Confused  []  Disoriented   []  Comatose   Head:  Atraumatic and normocephalic, without obvious abnormality.   Eyes:         PERRLA, conjunctivae and sclerae normal, no Icterus. No pallor. Extra-occular movements are within normal limits.   Ears:  Ears appear intact with no abnormalities noted.   Throat: No oral lesions, no thrush, oral mucosa moist.   Neck: Supple, trachea midline, no thyromegaly, no carotid bruit.   Back:   No kyphoscoliosis. No tenderness to palpation.   Lungs:   Chest shape is normal. Breath sounds heard bilaterally equally.  No wheezing.  Bilateral basal crackles heard.    Heart:  Normal S1 and S2, no murmur, no gallop, no rub. No JVD.   Abdomen:   Normal bowel sounds, no masses, no organomegaly. Soft, non-tender, non-distended, no guarding.  Mild truncal obesity.   Extremities: Moves all extremities; without edema, cyanosis or clubbing.    Poor core strength and stability.   Requires assistance with transfers at times.  Utilizes walker for assistance with ambulation.  Improved medial/lateral joint line tenderness of the left knee, mild effusion noted to the medial and lateral gutters.  Quadriceps mechanism intact.  Johnathan/Nguyen test negative.  Symmetric and appropriate dorsiflexion and plantar flexion of bilateral feet.   Pulses: Pulses palpable and equal bilaterally.   Skin: No bleeding or rash.  Generalized dry skin noted.  Age-related atrophy of skin.   Neurologic: [x] Normal speech []  Normal mental status    [x] Cranial nerves II through XII intact   [x]  No anosmia [x]  DTR 2+ [x]  Proprioception intact  [x]  No focal motor/sensory deficits      Psych/Mood:                    [x]  No acute changes []  Depressed  Urinary:                             [x]  Continent  []  Incontinent []  Retention  []  F/C      []  UTI w/treatment in progress    Procedures       ASSESSMENT     Diagnoses and all orders for this visit:    1. Impaired mobility and ADLs (Primary)    2. Nonintractable generalized idiopathic epilepsy without status epilepticus (CMS/HCC)    3. Mixed hyperlipidemia    4. Osteoporosis without current pathological fracture, unspecified osteoporosis type    5. Primary osteoarthritis of both knees          PLAN  Continue supportive care as needed for any mobilization/transfer assistance, please with patient's walker use for ambulation at times.  Fall precautions if needed.  ADL assistance also.  Nutritionally she has done well, continue to follow clinically.    Continue current antiepileptic treatment regimen.  No reports of any seizure-like activity since last visit.  Surveillance labs when needed.    Continue low-cholesterol dietary intake.  Maintain appropriate hydration status.  Increase physical activity as able.    Pain has improved significantly into her left knee, consideration of right knee injection should it become more problematic.    Balance labs when needed, otherwise vital signs demonstrate hemodynamic stability.    [x]  Discussed Patient in detail with nursing/staff, addressed all needs today.     [x]  Plan of Care Reviewed   []  PT/OT Reviewed   []  Order Changes  []  Discharge Plans Reviewed   []  Code Status Changes    I have reviewed and updated all copied forward information, as appropriate.  I attest to the accuracy and relevance of any unchanged information.       Luigi Urena DO  9/16/2020

## 2020-10-21 ENCOUNTER — NURSING HOME (OUTPATIENT)
Dept: FAMILY MEDICINE CLINIC | Facility: CLINIC | Age: 65
End: 2020-10-21

## 2020-10-21 VITALS
BODY MASS INDEX: 37.68 KG/M2 | RESPIRATION RATE: 20 BRPM | TEMPERATURE: 98 F | WEIGHT: 206 LBS | OXYGEN SATURATION: 96 % | SYSTOLIC BLOOD PRESSURE: 130 MMHG | DIASTOLIC BLOOD PRESSURE: 84 MMHG | HEART RATE: 70 BPM

## 2020-10-21 DIAGNOSIS — Z74.09 IMPAIRED MOBILITY AND ADLS: Primary | Chronic | ICD-10-CM

## 2020-10-21 DIAGNOSIS — E55.9 VITAMIN D DEFICIENCY: ICD-10-CM

## 2020-10-21 DIAGNOSIS — E78.2 MIXED HYPERLIPIDEMIA: ICD-10-CM

## 2020-10-21 DIAGNOSIS — M25.562 ARTHRALGIA OF LEFT KNEE: ICD-10-CM

## 2020-10-21 DIAGNOSIS — G40.309 NONINTRACTABLE GENERALIZED IDIOPATHIC EPILEPSY WITHOUT STATUS EPILEPTICUS (HCC): Chronic | ICD-10-CM

## 2020-10-21 DIAGNOSIS — M17.0 PRIMARY OSTEOARTHRITIS OF BOTH KNEES: ICD-10-CM

## 2020-10-21 DIAGNOSIS — Z78.9 IMPAIRED MOBILITY AND ADLS: Primary | Chronic | ICD-10-CM

## 2020-10-21 PROCEDURE — 99309 SBSQ NF CARE MODERATE MDM 30: CPT | Performed by: FAMILY MEDICINE

## 2020-11-18 ENCOUNTER — NURSING HOME (OUTPATIENT)
Dept: FAMILY MEDICINE CLINIC | Facility: CLINIC | Age: 65
End: 2020-11-18
Payer: MEDICARE

## 2020-11-18 DIAGNOSIS — G40.309 NONINTRACTABLE GENERALIZED IDIOPATHIC EPILEPSY WITHOUT STATUS EPILEPTICUS: Chronic | ICD-10-CM

## 2020-11-18 DIAGNOSIS — Z74.09 IMPAIRED MOBILITY AND ADLS: Primary | Chronic | ICD-10-CM

## 2020-11-18 DIAGNOSIS — M17.0 PRIMARY OSTEOARTHRITIS OF BOTH KNEES: ICD-10-CM

## 2020-11-18 DIAGNOSIS — Z78.9 IMPAIRED MOBILITY AND ADLS: Primary | Chronic | ICD-10-CM

## 2020-11-18 PROCEDURE — 99308 SBSQ NF CARE LOW MDM 20: CPT | Performed by: FAMILY MEDICINE

## 2020-11-18 NOTE — PROGRESS NOTES
Nursing Home Progress Note        Luigi Urena DO [x]  LATONIA Tony []  [address] [place] [address]  Phone: [phone]  Fax: [phone] Marcell Christina MD []  Rm Tran DO []  [address] [place] [address]  Phone: [phone]  Fax: [phone]     PATIENT NAME: Viviane Peñaloza                                                                          YOB: 1955           DATE OF SERVICE: 11/18/2020  FACILITY: []  [facility]  [] [facility]  [x]  [facility]  [] [facility]  []  Other ______________________________________________________________________     CHIEF COMPLAINT:  Chronic medical management and long-term care/seizure activity      HISTORY OF PRESENT ILLNESS:   [x]  Follow Up visit for coordination of long term care issues and chronic medical management of Diagnoses and all orders for this visit:    1. Impaired mobility and ADLs (Primary)    2. Primary osteoarthritis of both knees    3. Nonintractable generalized idiopathic epilepsy without status epilepticus (CMS/HCC)          PAST MEDICAL & SURGICAL HISTORY:   Past Medical History:   Diagnosis Date   • Epilepsy (CMS/HCC)    • Fracture, clavicle     left    • Shoulder fracture, left       Past Surgical History:   Procedure Laterality Date   • OOPHORECTOMY           MEDICATIONS:  I have reviewed and reconciled the patients medication list in the patients chart at the skilled nursing facility today.      ALLERGIES:    No Known Allergies      SOCIAL HISTORY:    Social History     Socioeconomic History   • Marital status: Single     Spouse name: Not on file   • Number of children: Not on file   • Years of education: Not on file   • Highest education level: Not on file   Tobacco Use   • Smoking status: Never Smoker   • Smokeless tobacco: Never Used   Substance and Sexual Activity   • Alcohol use: No   • Drug use: No   • Sexual activity: Defer       FAMILY HISTORY:    Family History   Problem Relation Age of  Onset   • Diabetes Mother    • Heart disease Mother    • Hypertension Mother    • Hypertension Father    • Diabetes Sister    • Hypertension Sister        REVIEW OF SYSTEMS:    Review of Systems  · Appetite: Fair []   Good [x]   Poor []   Weight Loss []  [x]  Weight Stable   Unavoidable Weight Loss []  Tolerating Tube Feeding []    Supplements Provided []   · Constitutional: Negative for fever, chills, diaphoresis or fatigue and weight change.   · HENT: No dysphagia; no changes to vision/hearing/smell/taste; no epistaxis  · Eyes: Negative for redness and visual disturbance.   · Respiratory: Negative for shortness of breath, chest pain, cough or chest tightness.   · Cardiovascular: Negative for chest pain and palpitations.   · Gastrointestinal: Negative for abdominal distention, abdominal pain and blood in stool.   · Endocrine: Negative for cold intolerance and heat intolerance.   · Genitourinary: Negative for difficulty urinating, dysuria and frequency.Negative for hematuria   · Musculoskeletal: Chronic myalgias and arthralgias.  · Integumentary: No open wounds, rash or concerning skin lesions  · Neurological: Negative for syncope, weakness and headaches.   · Hematological: Negative for adenopathy. Does not bruise/bleed easily.   · Immunological: Negative for reported allergies or immunological disorders  · Psychological: No acute behavioral changes    PHYSICAL EXAMINATION:   VITAL SIGNS:   There were no vitals filed for this visit.    Physical Exam      General Appearance:  [x]  Alert   [x]  Oriented x person  [x]  No acute distress     []  Confused  []  Disoriented   []  Comatose   Head:  Atraumatic and normocephalic, without obvious abnormality.   Eyes:         PERRLA, conjunctivae and sclerae normal, no Icterus. No pallor. Extra-occular movements are within normal limits.   Ears:  Ears appear intact with no abnormalities noted.   Throat: No oral lesions, no thrush, oral mucosa moist.   Neck: Supple, trachea  midline, no thyromegaly, no carotid bruit.   Back:   No kyphoscoliosis. No tenderness to palpation.   Lungs:   Chest shape is normal. Breath sounds heard bilaterally equally.  No wheezing.  Bilateral basal crackles heard.    Heart:  Normal S1 and S2, no murmur, no gallop, no rub. No JVD.   Abdomen:   Normal bowel sounds, no masses, no organomegaly. Soft, non-tender, non-distended, no guarding.  Mild truncal obesity.   Extremities: Moves all extremities; without edema, cyanosis or clubbing.    Poor core strength and stability.   Requires assistance with transfers at times.  Utilizes walker for assistance with ambulation.  Improved medial/lateral joint line tenderness of the left knee, mild effusion noted to the medial and lateral gutters.  Quadriceps mechanism intact.  Johnathan/Nguyen test negative.  Symmetric and appropriate dorsiflexion and plantar flexion of bilateral feet.   Pulses: Pulses palpable and equal bilaterally.   Skin: No bleeding or rash.  Generalized dry skin noted.  Age-related atrophy of skin.   Neurologic: [x] Normal speech []  Normal mental status    [x] Cranial nerves II through XII intact   [x]  No anosmia [x]  DTR 2+ [x]  Proprioception intact  [x]  No focal motor/sensory deficits      Psych/Mood:                    [x]  No acute changes []  Depressed  Urinary:                            [x]  Continent  []  Incontinent []  Retention  []  F/C      []  UTI w/treatment in progress           ASSESSMENT     Diagnoses and all orders for this visit:    1. Impaired mobility and ADLs (Primary)    2. Primary osteoarthritis of both knees    3. Nonintractable generalized idiopathic epilepsy without status epilepticus (CMS/[facility])          PLAN      [x]  Discussed Patient in detail with nursing/staff, addressed all needs today.     [x]  Plan of Care Reviewed   []  PT/OT Reviewed   []  Order Changes  []  Discharge Plans Reviewed   []  Code Status Changes    I have reviewed and updated all copied forward  information, as appropriate.  I attest to the accuracy and relevance of any unchanged information.       Luigi Urena DO  11/18/2020

## 2020-11-25 ENCOUNTER — NURSING HOME (OUTPATIENT)
Dept: FAMILY MEDICINE CLINIC | Facility: CLINIC | Age: 65
End: 2020-11-25

## 2020-11-25 VITALS
RESPIRATION RATE: 18 BRPM | TEMPERATURE: 99.3 F | DIASTOLIC BLOOD PRESSURE: 76 MMHG | SYSTOLIC BLOOD PRESSURE: 124 MMHG | OXYGEN SATURATION: 96 % | WEIGHT: 207 LBS | BODY MASS INDEX: 37.86 KG/M2 | HEART RATE: 77 BPM

## 2020-11-25 DIAGNOSIS — Z78.9 IMPAIRED MOBILITY AND ADLS: Chronic | ICD-10-CM

## 2020-11-25 DIAGNOSIS — U07.1 COVID-19 VIRUS DETECTED: Primary | ICD-10-CM

## 2020-11-25 DIAGNOSIS — Z74.09 IMPAIRED MOBILITY AND ADLS: Chronic | ICD-10-CM

## 2020-11-25 DIAGNOSIS — R05.8 RESPIRATORY TRACT CONGESTION WITH COUGH: ICD-10-CM

## 2020-11-25 PROCEDURE — 99309 SBSQ NF CARE MODERATE MDM 30: CPT | Performed by: NURSE PRACTITIONER

## 2020-11-29 NOTE — PROGRESS NOTES
Nursing Home Follow Up Note      Luigi Urena DO []   LATONIA Tony [x]  [address] [place] [address]  Phone: [phone]  Fax: [phone] Marcell Christina MD []    Rm Tran DO []   [address] [place] [address]  Phone: [phone]  Fax: [phone]     PATIENT NAME: Viviane Peñaloza                                                                          YOB: 1955           DATE OF SERVICE: 11/25/2020  FACILITY:  [][facility]   [] [facility]   [x] [facility]   [] [facility]   [] Other ______________________________________________________________________      CHIEF COMPLAINT:    Increased productive cough        HISTORY OF PRESENT ILLNESS:     Patient with increased productive cough today.  She does have known COVID-19.  Visit performed during video today because she is in a Covid unit, to decrease risk of exposure.  Chest x-ray has been performed and results are pending.  She has no other symptoms and vital signs are within normal limits.    PAST MEDICAL & SURGICAL HISTORY:   Past Medical History:   Diagnosis Date   • Epilepsy (CMS/HCC)    • Fracture, clavicle     left    • Shoulder fracture, left       Past Surgical History:   Procedure Laterality Date   • OOPHORECTOMY           MEDICATIONS:  I have reviewed and reconciled the patients medication list in the patients chart at the skilled nursing facility today.      ALLERGIES:  No Known Allergies      SOCIAL HISTORY:  Social History     Socioeconomic History   • Marital status: Single     Spouse name: Not on file   • Number of children: Not on file   • Years of education: Not on file   • Highest education level: Not on file   Tobacco Use   • Smoking status: Never Smoker   • Smokeless tobacco: Never Used   Substance and Sexual Activity   • Alcohol use: No   • Drug use: No   • Sexual activity: Defer       FAMILY HISTORY:  Family History   Problem Relation Age of Onset   • Diabetes Mother    • Heart disease Mother     • Hypertension Mother    • Hypertension Father    • Diabetes Sister    • Hypertension Sister        REVIEW OF SYSTEMS:  Review of Systems   Constitutional: Negative for activity change, appetite change, chills, diaphoresis, fatigue, fever, unexpected weight gain and unexpected weight loss.   HENT: Negative for congestion, ear pain, mouth sores, nosebleeds, postnasal drip, rhinorrhea, sinus pressure, sneezing, sore throat, swollen glands and trouble swallowing.    Eyes: Negative for blurred vision, pain, discharge, redness, itching and visual disturbance.   Respiratory: Positive for cough. Negative for apnea, choking, chest tightness, shortness of breath, wheezing and stridor.    Cardiovascular: Negative for chest pain, palpitations and leg swelling.   Gastrointestinal: Negative for abdominal distention, abdominal pain, blood in stool, constipation, diarrhea, nausea, vomiting, GERD and indigestion.   Endocrine: Negative for polydipsia and polyuria.   Genitourinary: Negative for decreased urine volume, difficulty urinating, dysuria, flank pain, frequency, hematuria, urgency and urinary incontinence.   Musculoskeletal: Positive for arthralgias, gait problem and myalgias. Negative for back pain and joint swelling.   Skin: Negative for color change, dry skin, rash and skin lesions.   Allergic/Immunologic: Negative for environmental allergies.   Neurological: Positive for seizures and weakness. Negative for dizziness, speech difficulty, memory problem and confusion.   Psychiatric/Behavioral: Negative for behavioral problems, dysphoric mood, hallucinations, sleep disturbance and depressed mood. The patient is not nervous/anxious.          PHYSICAL EXAMINATION:   VITAL SIGNS:     Vitals:    11/25/20 1604   BP: 124/76   Pulse: 77   Resp: 18   Temp: 99.3 °F (37.4 °C)   SpO2: 96%   Weight: 93.9 kg (207 lb)       Physical Exam   Constitutional: She appears well-developed and well-nourished.   HENT:   Head: Normocephalic.    Right Ear: External ear normal.   Left Ear: External ear normal.   Nose: Nose normal.   Eyes: Conjunctivae are normal.   Neck: Normal range of motion.   Cardiovascular: Normal rate.   Pulmonary/Chest: Effort normal. No respiratory distress.   Abdominal: She exhibits no distension.   Musculoskeletal: No deformity.      Right knee: She exhibits normal range of motion, no swelling, no effusion, no ecchymosis, no deformity, no erythema and normal alignment. No patellar tendon tenderness noted.      Left knee: She exhibits decreased range of motion. Tenderness found.      Comments: Painful ROM of left knee    Neurological: She is alert.   Skin: Skin is warm and dry. No rash noted.   Psychiatric: Her behavior is normal.   Nursing note and vitals reviewed.      RECORDS REVIEW:   I have reviewed and interpreted the following lab test results  obtained at the time of the visit today: no new labs since last visit, patient on annual schedule. CMP normal    ASSESSMENT     Diagnoses and all orders for this visit:    1. COVID-19 virus detected (Primary)    2. Respiratory tract congestion with cough    3. Impaired mobility and ADLs        PLAN     COVID-19/respiratory congestion cough  -Patient with no Covid 19 virus.  She has reports of increased cough and congestion.  Chest x-ray has been performed and nursing to call with results when available.  She has no other signs or symptoms and vital signs within normal limits.  Will continue to monitor    Continue supportive care as needed for mobilization and any assistance with ADLs.       Patient was encouraged to keep me informed of any acute changes, lack of improvement, or any new concerning symptoms.      [x]  Discussed Patient in detail with nursing/staff, addressed all needs today.     [x]  Plan of Care Reviewed   []  PT/OT Reviewed   [x]  Order Changes  []  Discharge Plans Reviewed  [x]  Advance Directive on file with Nursing Home.   [x]  POA on file with Nursing Home.   []   Code Status listed: [x]  Full Code   []  DNR       “I confirm accuracy of unchanged data/findings which have been carried forward from previous visit, as well as I have updated appropriately those that have changed.”                Marlene Garcia, APRN.

## 2020-12-01 ENCOUNTER — NURSING HOME (OUTPATIENT)
Dept: FAMILY MEDICINE CLINIC | Facility: CLINIC | Age: 65
End: 2020-12-01

## 2020-12-01 VITALS
WEIGHT: 207 LBS | HEART RATE: 78 BPM | RESPIRATION RATE: 16 BRPM | TEMPERATURE: 98.8 F | BODY MASS INDEX: 37.86 KG/M2 | SYSTOLIC BLOOD PRESSURE: 111 MMHG | OXYGEN SATURATION: 95 % | DIASTOLIC BLOOD PRESSURE: 63 MMHG

## 2020-12-01 DIAGNOSIS — R29.898 BILATERAL LEG WEAKNESS: ICD-10-CM

## 2020-12-01 DIAGNOSIS — Z78.9 IMPAIRED MOBILITY AND ADLS: Chronic | ICD-10-CM
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
## 2020-12-01 DIAGNOSIS — R05.9 COUGH: ICD-10-CM

## 2020-12-01 DIAGNOSIS — U07.1 COVID-19 VIRUS INFECTION: Primary | ICD-10-CM

## 2020-12-01 DIAGNOSIS — Z74.09 IMPAIRED MOBILITY AND ADLS: Chronic | ICD-10-CM

## 2020-12-01 PROCEDURE — 99309 SBSQ NF CARE MODERATE MDM 30: CPT | Performed by: NURSE PRACTITIONER

## 2020-12-02 ENCOUNTER — NURSING HOME (OUTPATIENT)
Dept: FAMILY MEDICINE CLINIC | Facility: CLINIC | Age: 65
End: 2020-12-02

## 2020-12-02 ENCOUNTER — RESULTS ENCOUNTER (OUTPATIENT)
Dept: FAMILY MEDICINE CLINIC | Facility: CLINIC | Age: 65
End: 2020-12-02

## 2020-12-02 VITALS
TEMPERATURE: 98.6 F | HEART RATE: 72 BPM | WEIGHT: 207 LBS | BODY MASS INDEX: 37.86 KG/M2 | SYSTOLIC BLOOD PRESSURE: 125 MMHG | DIASTOLIC BLOOD PRESSURE: 65 MMHG | RESPIRATION RATE: 18 BRPM | OXYGEN SATURATION: 91 %

## 2020-12-02 DIAGNOSIS — Z74.09 IMPAIRED MOBILITY AND ADLS: Chronic | ICD-10-CM

## 2020-12-02 DIAGNOSIS — G40.309 NONINTRACTABLE GENERALIZED IDIOPATHIC EPILEPSY WITHOUT STATUS EPILEPTICUS (HCC): Chronic | ICD-10-CM

## 2020-12-02 DIAGNOSIS — M17.0 PRIMARY OSTEOARTHRITIS OF BOTH KNEES: ICD-10-CM

## 2020-12-02 DIAGNOSIS — Z78.9 IMPAIRED MOBILITY AND ADLS: Chronic | ICD-10-CM

## 2020-12-02 DIAGNOSIS — U07.1 COVID-19 VIRUS INFECTION: Primary | ICD-10-CM

## 2020-12-02 DIAGNOSIS — E78.2 MIXED HYPERLIPIDEMIA: ICD-10-CM

## 2020-12-02 DIAGNOSIS — Z12.11 COLON CANCER SCREENING: Primary | ICD-10-CM

## 2020-12-02 DIAGNOSIS — Z12.11 COLON CANCER SCREENING: ICD-10-CM

## 2020-12-02 PROCEDURE — 99309 SBSQ NF CARE MODERATE MDM 30: CPT | Performed by: FAMILY MEDICINE

## 2020-12-16 ENCOUNTER — DOCUMENTATION (OUTPATIENT)
Dept: FAMILY MEDICINE CLINIC | Facility: CLINIC | Age: 65
End: 2020-12-16
Payer: MEDICARE

## 2020-12-16 VITALS
DIASTOLIC BLOOD PRESSURE: 97 MMHG | HEART RATE: 78 BPM | TEMPERATURE: 96.2 F | OXYGEN SATURATION: 98 % | SYSTOLIC BLOOD PRESSURE: 148 MMHG | RESPIRATION RATE: 18 BRPM | BODY MASS INDEX: 35.67 KG/M2 | WEIGHT: 195 LBS

## 2020-12-22 ENCOUNTER — NURSING HOME (OUTPATIENT)
Dept: FAMILY MEDICINE CLINIC | Facility: CLINIC | Age: 65
End: 2020-12-22

## 2020-12-22 VITALS
WEIGHT: 195 LBS | HEART RATE: 69 BPM | OXYGEN SATURATION: 94 % | BODY MASS INDEX: 35.67 KG/M2 | DIASTOLIC BLOOD PRESSURE: 57 MMHG | TEMPERATURE: 97.5 F | RESPIRATION RATE: 18 BRPM | SYSTOLIC BLOOD PRESSURE: 102 MMHG

## 2020-12-22 DIAGNOSIS — Z78.9 IMPAIRED MOBILITY AND ADLS: Chronic | ICD-10-CM

## 2020-12-22 DIAGNOSIS — R26.81 UNSTEADY GAIT: ICD-10-CM

## 2020-12-22 DIAGNOSIS — Z74.09 IMPAIRED MOBILITY AND ADLS: Chronic | ICD-10-CM

## 2020-12-22 DIAGNOSIS — Z87.39: ICD-10-CM

## 2020-12-22 DIAGNOSIS — R29.898 WEAKNESS OF BOTH LOWER EXTREMITIES: ICD-10-CM
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
## 2020-12-22 PROCEDURE — 99309 SBSQ NF CARE MODERATE MDM 30: CPT | Performed by: NURSE PRACTITIONER

## 2020-12-23 RX ORDER — OXYCODONE HYDROCHLORIDE 5 MG/1
5 TABLET ORAL EVERY 6 HOURS PRN
Qty: 120 TABLET | Refills: 0 | Status: SHIPPED | OUTPATIENT
Start: 2020-12-23 | End: 2021-04-26 | Stop reason: SDUPTHER

## 2020-12-25 NOTE — PROGRESS NOTES
Nursing Home Follow Up Note      Luigi Urena DO []   LATONIA Tony [x]  [address] [place] [address]  Phone: [phone]  Fax: [phone] Marcell Christina MD []    Rm Tran DO []   [address] [place] [address]  Phone: [phone]  Fax: [phone]     PATIENT NAME: Viviane Peñaloza                                                                          YOB: 1955           DATE OF SERVICE: 12/22/2020  FACILITY:  [][place]   [] [place]   [x] [facility]   [] [facility]   [] Other ______________________________________________________________________      CHIEF COMPLAINT:    Follow up fall with left elbow pain    Follow up left elbow Xray        HISTORY OF PRESENT ILLNESS:     Patient had a fall in her room on Monday, after losing her balance. She hit her left elbow during the fall and was experiencing pain. An Xray was performed yesterday, and Xray was negative. She reports that the pain in her left elbow has resolved and she is doing well today. Patient ambulating around her room today without walker during visit. She was advised not to be ambulating around her room without walker, and not to ambulate about room for long periods without resting, because she is increasing her risk of falling.     PAST MEDICAL & SURGICAL HISTORY:   Past Medical History:   Diagnosis Date   • Epilepsy (CMS/HCC)    • Fracture, clavicle     left    • Shoulder fracture, left       Past Surgical History:   Procedure Laterality Date   • OOPHORECTOMY           MEDICATIONS:  I have reviewed and reconciled the patients medication list in the patients chart at the skilled nursing facility today.      ALLERGIES:  No Known Allergies      SOCIAL HISTORY:  Social History     Socioeconomic History   • Marital status: Single     Spouse name: Not on file   • Number of children: Not on file   • Years of education: Not on file   • Highest education level: Not on file   Tobacco Use   • Smoking  status: Never Smoker   • Smokeless tobacco: Never Used   Substance and Sexual Activity   • Alcohol use: No   • Drug use: No   • Sexual activity: Defer       FAMILY HISTORY:  Family History   Problem Relation Age of Onset   • Diabetes Mother    • Heart disease Mother    • Hypertension Mother    • Hypertension Father    • Diabetes Sister    • Hypertension Sister        REVIEW OF SYSTEMS:  Review of Systems   Constitutional: Negative for activity change, appetite change, chills, diaphoresis, fatigue, fever, unexpected weight gain and unexpected weight loss.   HENT: Negative for congestion, ear pain, mouth sores, nosebleeds, postnasal drip, rhinorrhea, sinus pressure, sneezing, sore throat, swollen glands and trouble swallowing.    Eyes: Negative for blurred vision, pain, discharge, redness, itching and visual disturbance.   Respiratory: Negative for apnea, cough, choking, chest tightness, shortness of breath, wheezing and stridor.    Cardiovascular: Negative for chest pain, palpitations and leg swelling.   Gastrointestinal: Negative for abdominal distention, abdominal pain, blood in stool, constipation, diarrhea, nausea, vomiting, GERD and indigestion.   Endocrine: Negative for polydipsia and polyuria.   Genitourinary: Negative for decreased urine volume, difficulty urinating, dysuria, flank pain, frequency, hematuria, urgency and urinary incontinence.   Musculoskeletal: Positive for arthralgias, gait problem and myalgias. Negative for back pain and joint swelling.   Skin: Negative for color change, dry skin, rash and skin lesions.   Allergic/Immunologic: Negative for environmental allergies.   Neurological: Positive for seizures and weakness. Negative for dizziness, speech difficulty, memory problem and confusion.   Psychiatric/Behavioral: Negative for behavioral problems, dysphoric mood, hallucinations, sleep disturbance and depressed mood. The patient is not nervous/anxious.          PHYSICAL EXAMINATION:   VITAL  SIGNS:     Vitals:    12/22/20 1541   BP: 102/57   Pulse: 69   Resp: 18   Temp: 97.5 °F (36.4 °C)   SpO2: 94%   Weight: 88.5 kg (195 lb)       Physical Exam   Constitutional: She appears well-developed and well-nourished.   HENT:   Head: Normocephalic.   Right Ear: External ear normal.   Left Ear: External ear normal.   Nose: Nose normal.   Eyes: Conjunctivae are normal.   Neck: Normal range of motion.   Cardiovascular: Normal rate.   Pulmonary/Chest: Effort normal. No respiratory distress.   Abdominal: She exhibits no distension.   Musculoskeletal: No deformity.      Right knee: She exhibits normal range of motion, no swelling, no effusion, no ecchymosis, no deformity, no erythema and normal alignment. No patellar tendon tenderness noted.      Left knee: She exhibits decreased range of motion. Tenderness found.      Comments: Painful ROM of left knee     Lower extremity weakness   Neurological: She is alert.   Skin: Skin is warm and dry. No rash noted.   Psychiatric: Her behavior is normal.   Nursing note and vitals reviewed.      RECORDS REVIEW:   I have reviewed and interpreted the most recent labs    ASSESSMENT     Diagnoses and all orders for this visit:    1. History of pain in elbow    2. Weakness of both lower extremities    3. Unsteady gait    4. Fall, initial encounter    5. Impaired mobility and ADLs        PLAN     History left elbow pain/weakness BLE/unsteady gait/fall  -Patient with a fall due to weakness or unsteady gait, which resulted in left elbow pain. Xray of left elbow was negative yesterday. She reports pain has resolved and she is doing well today. Patient was encouraged to keep me informed of any acute changes, or any new concerning symptoms.    Continue supportive care as needed for mobilization and any assistance with ADLs.           [x]  Discussed Patient in detail with nursing/staff, addressed all needs today.     [x]  Plan of Care Reviewed   []  PT/OT Reviewed   [x]  Order Changes  []   Discharge Plans Reviewed  [x]  Advance Directive on file with Nursing Home.   [x]  POA on file with Nursing Home.   []  Code Status listed: [x]  Full Code   []  DNR       “I confirm accuracy of unchanged data/findings which have been carried forward from previous visit, as well as I have updated appropriately those that have changed.”                  Marlene Garcia, APRN.

## 2020-12-27 DIAGNOSIS — G40.309 NONINTRACTABLE GENERALIZED IDIOPATHIC EPILEPSY WITHOUT STATUS EPILEPTICUS (HCC): Primary | Chronic | ICD-10-CM

## 2020-12-27 RX ORDER — PHENOBARBITAL 64.8 MG/1
64.8 TABLET ORAL 2 TIMES DAILY
Qty: 60 TABLET | Refills: 5 | Status: SHIPPED | OUTPATIENT
Start: 2020-12-27 | End: 2021-05-21 | Stop reason: SDUPTHER

## 2021-01-06 ENCOUNTER — NURSING HOME (OUTPATIENT)
Dept: FAMILY MEDICINE CLINIC | Facility: CLINIC | Age: 66
End: 2021-01-06

## 2021-01-06 VITALS
TEMPERATURE: 98.1 F | OXYGEN SATURATION: 94 % | SYSTOLIC BLOOD PRESSURE: 122 MMHG | HEART RATE: 68 BPM | DIASTOLIC BLOOD PRESSURE: 71 MMHG | BODY MASS INDEX: 36.58 KG/M2 | RESPIRATION RATE: 20 BRPM | WEIGHT: 200 LBS

## 2021-01-06 DIAGNOSIS — E78.2 MIXED HYPERLIPIDEMIA: ICD-10-CM

## 2021-01-06 DIAGNOSIS — Z78.9 IMPAIRED MOBILITY AND ADLS: Primary | Chronic | ICD-10-CM

## 2021-01-06 DIAGNOSIS — G40.309 NONINTRACTABLE GENERALIZED IDIOPATHIC EPILEPSY WITHOUT STATUS EPILEPTICUS (HCC): Chronic | ICD-10-CM

## 2021-01-06 DIAGNOSIS — M17.0 PRIMARY OSTEOARTHRITIS OF BOTH KNEES: ICD-10-CM

## 2021-01-06 DIAGNOSIS — Z74.09 IMPAIRED MOBILITY AND ADLS: Primary | Chronic | ICD-10-CM

## 2021-01-06 PROCEDURE — 99309 SBSQ NF CARE MODERATE MDM 30: CPT | Performed by: FAMILY MEDICINE

## 2021-01-22 NOTE — PROGRESS NOTES
Nursing Home Progress Note        Luigi Urena DO [x]  LATONIA Tony []  [address] [place] [address]  Phone: [phone]  Fax: [phone] Marcell Christina MD []  Rm Tran DO []  [address] [place] [address]  Phone: [phone]  Fax: [phone]     PATIENT NAME: Viviane Peñaloza                                                                          YOB: 1955           DATE OF SERVICE: 1/6/2021  FACILITY: []  [place]  [] [place]  [x]  [facility]  [] [facility]  []  Other ______________________________________________________________________     CHIEF COMPLAINT:  Chronic medical management and long-term care/seizure activity      HISTORY OF PRESENT ILLNESS:   [x]  Follow Up visit for coordination of long term care issues and chronic medical management of Diagnoses and all orders for this visit:    1. Impaired mobility and ADLs (Primary)    2. Nonintractable generalized idiopathic epilepsy without status epilepticus (CMS/HCC)    3. Primary osteoarthritis of both knees    4. Mixed hyperlipidemia    Patient is pleasant and interactive during questioning and examination.  She states she has been receptive to supportive care for any mobilization/transfer assistance, as well as ADLs when needed.  Nutritionally she has done well, she denies any aspiration or dysphagia.  She denies any verbal black tarry stools.    No reported seizure-like activity since last visit.    She needs to be bothered with arthralgias of bilateral knees, slightly worse to the left.  She denies any falls or injuries.      PAST MEDICAL & SURGICAL HISTORY:   Past Medical History:   Diagnosis Date   • Epilepsy (CMS/HCC)    • Fracture, clavicle     left    • Shoulder fracture, left       Past Surgical History:   Procedure Laterality Date   • OOPHORECTOMY           MEDICATIONS:  I have reviewed and reconciled the patients medication list in the patients chart at the [facility] nursing [facility]  today.      ALLERGIES:    No Known Allergies      SOCIAL HISTORY:    Social History     Socioeconomic History   • Marital status: Single     Spouse name: Not on file   • Number of children: Not on file   • Years of education: Not on file   • Highest education level: Not on file   Tobacco Use   • Smoking status: Never Smoker   • Smokeless tobacco: Never Used   Substance and Sexual Activity   • Alcohol use: No   • Drug use: No   • Sexual activity: Defer       FAMILY HISTORY:    Family History   Problem Relation Age of Onset   • Diabetes Mother    • Heart disease Mother    • Hypertension Mother    • Hypertension Father    • Diabetes Sister    • Hypertension Sister        REVIEW OF SYSTEMS:    Review of Systems  · Appetite: Fair []   Good [x]   Poor []   Weight Loss []  [x]  Weight Stable   Unavoidable Weight Loss []  Tolerating Tube Feeding []    Supplements Provided []   · Constitutional: Negative for fever, chills, diaphoresis or fatigue and weight change.   · HENT: No dysphagia; no changes to vision/hearing/smell/taste; no epistaxis  · Eyes: Negative for redness and visual disturbance.   · Respiratory: Negative for shortness of breath, chest pain, cough or chest tightness.   · Cardiovascular: Negative for chest pain and palpitations.   · Gastrointestinal: Negative for abdominal distention, abdominal pain and blood in stool.   · Endocrine: Negative for cold intolerance and heat intolerance.   · Genitourinary: Negative for difficulty urinating, dysuria and frequency.Negative for hematuria   · Musculoskeletal: Chronic myalgias and arthralgias.  · Integumentary: No open wounds, rash or concerning skin lesions  · Neurological: Negative for syncope, weakness and headaches.   · Hematological: Negative for adenopathy. Does not bruise/bleed easily.   · Immunological: Negative for reported allergies or immunological disorders  · Psychological: No acute behavioral changes    PHYSICAL EXAMINATION:   VITAL SIGNS:   Vitals:     01/06/21 1425   BP: 122/71   Pulse: 68   Resp: 20   Temp: 98.1 °F (36.7 °C)   SpO2: 94%       Physical Exam      General Appearance:  [x]  Alert   [x]  Oriented x person  [x]  No acute distress     []  Confused  []  Disoriented   []  Comatose   Head:  Atraumatic and normocephalic, without obvious abnormality.   Eyes:         PERRLA, conjunctivae and sclerae normal, no Icterus. No pallor. Extra-occular movements are within normal limits.   Ears:  Ears appear intact with no abnormalities noted.   Throat: No oral lesions, no thrush, oral mucosa moist.   Neck: Supple, trachea midline, no thyromegaly, no carotid bruit.   Back:   No kyphoscoliosis. No tenderness to palpation.   Lungs:   Chest shape is normal. Breath sounds heard bilaterally equally.  No wheezing.    Audible air exchange noted all lung fields.   Heart:  Normal S1 and S2, no murmur, no gallop, no rub. No JVD.   Abdomen:   Normal bowel sounds, no masses, no organomegaly. Soft, non-tender, non-distended, no guarding.  Mild truncal obesity.   Extremities: Moves all extremities; without edema, cyanosis or clubbing.    Poor core strength and stability.   Requires assistance with transfers at times.  Utilizes walker for assistance with ambulation.  Mild medial/lateral joint line tenderness of the left knee, mild effusion noted to the medial and lateral gutters.  Quadriceps mechanism intact.  Johnathan/Nguyen test negative.  Symmetric and appropriate dorsiflexion and plantar flexion of bilateral feet.   Pulses: Pulses palpable and equal bilaterally.   Skin: No bleeding or rash.  Generalized dry skin noted.  Age-related atrophy of skin.   Neurologic: [x] Normal speech []  Normal mental status    [x] Cranial nerves II through XII intact   [x]  No anosmia [x]  DTR 2+ [x]  Proprioception intact  [x]  No focal motor/sensory deficits      Psych/Mood:                    [x]  No acute changes []  Depressed  Urinary:                            [x]  Continent  []  Incontinent  []  Retention  []  F/C      []  UTI w/treatment in progress           ASSESSMENT     Diagnoses and all orders for this visit:    1. Impaired mobility and ADLs (Primary)    2. Nonintractable generalized idiopathic epilepsy without status epilepticus (CMS/HCC)    3. Primary osteoarthritis of both knees    4. Mixed hyperlipidemia          PLAN  Continue supportive care for mobilization/transfer assistance as needed, as well as ADLs.  Continue to follow her nutritional intake, appears well-nourished and hydrated during my exam today.    Continue current antiepileptic treatment regimen.  Surveillance labs when needed.    Continue to follow osteoarthritic changes bilateral knees clinically.  Consider viscosupplementation injections.  Quadricep strengthening should be focus of lower extremity exercise program.    Surveillance labs when needed to monitor liver/kidney function.  As well as electrolytes.  Vital signs stable today, blood pressure is at goal.    [x]  Discussed Patient in detail with nursing/staff, addressed all needs today.     [x]  Plan of Care Reviewed   []  PT/OT Reviewed   []  Order Changes  []  Discharge Plans Reviewed   []  Code Status Changes    I spent 30 minutes caring for Viviane on this date of service. This time includes time spent by me in the following activities:preparing for the visit, reviewing tests, performing a medically appropriate examination and/or evaluation , counseling and educating the patient/family/caregiver, documenting information in the medical record and care coordination    I have reviewed and updated all copied forward information, as appropriate.  I attest to the accuracy and relevance of any unchanged information.       Luigi Urena DO  1/6/2021

## 2021-02-01 RX ORDER — CLONAZEPAM 0.5 MG/1
TABLET ORAL
Qty: 90 TABLET | Refills: 5 | Status: SHIPPED | OUTPATIENT
Start: 2021-02-01 | End: 2021-04-26 | Stop reason: SDUPTHER

## 2021-02-01 NOTE — TELEPHONE ENCOUNTER
PORFIRIO MEDICATION REFILL REQUEST FOR CLONAZEPAM 0.5 MG.    DIRECTIONS: TAKE 1 TAB PO DURING DAY, AND TAKE 2 TABS (1MG) AT BEDTIME.

## 2021-02-03 ENCOUNTER — NURSING HOME (OUTPATIENT)
Dept: FAMILY MEDICINE CLINIC | Facility: CLINIC | Age: 66
End: 2021-02-03

## 2021-02-03 VITALS
TEMPERATURE: 98 F | SYSTOLIC BLOOD PRESSURE: 112 MMHG | OXYGEN SATURATION: 97 % | HEART RATE: 60 BPM | RESPIRATION RATE: 20 BRPM | DIASTOLIC BLOOD PRESSURE: 64 MMHG

## 2021-02-03 DIAGNOSIS — G40.309 NONINTRACTABLE GENERALIZED IDIOPATHIC EPILEPSY WITHOUT STATUS EPILEPTICUS (HCC): Chronic | ICD-10-CM

## 2021-02-03 DIAGNOSIS — E55.9 VITAMIN D DEFICIENCY: ICD-10-CM

## 2021-02-03 DIAGNOSIS — Z74.09 IMPAIRED MOBILITY AND ADLS: Primary | Chronic | ICD-10-CM

## 2021-02-03 DIAGNOSIS — M17.0 PRIMARY OSTEOARTHRITIS OF BOTH KNEES: ICD-10-CM

## 2021-02-03 DIAGNOSIS — Z12.11 COLON CANCER SCREENING: ICD-10-CM

## 2021-02-03 DIAGNOSIS — Z78.9 IMPAIRED MOBILITY AND ADLS: Primary | Chronic | ICD-10-CM

## 2021-02-03 PROCEDURE — 99309 SBSQ NF CARE MODERATE MDM 30: CPT | Performed by: FAMILY MEDICINE

## 2021-02-03 NOTE — PROGRESS NOTES
Nursing Home Progress Note        Luigi Urena DO [x]  LATONIA Tony []  [address] [place] [address]  Phone: [phone]  Fax: [phone] Marcell Christina MD []  Rm Tran DO []  [address] [place] [address]  Phone: [phone]  Fax: [phone]     PATIENT NAME: Viviane Peñaloza                                                                          YOB: 1955           DATE OF SERVICE: 2/3/2021  FACILITY: []  [place]  [] [place]  [x]  [facility] [place]  [] [facility]  []  Other ______________________________________________________________________     CHIEF COMPLAINT:  Chronic medical management and long-term care/seizure activity      HISTORY OF PRESENT ILLNESS:   [x]  Follow Up visit for coordination of long term care issues and chronic medical management of Diagnoses and all orders for this visit:    1. Impaired mobility and ADLs (Primary)    2. Primary osteoarthritis of both knees    3. Vitamin D deficiency    4. Nonintractable generalized idiopathic epilepsy without status epilepticus (CMS/HCC)    5. Colon cancer screening    Patient is very pleasant and interactive during questioning examination.  Nursing/staff reports she has been receptive to supportive care assistance, including mobilization and transfers when needed.  Nutritionally has done well, tolerating all p.o. intake without reports of aspiration or dysphagia.  No falls or injuries reported.    Pain to bilateral knees has been chronic, no acute worsening.  Denies any new trauma.    No reported seizure-like activity.    Patient received her Cologuard for colon cancer screening 2 months ago, however this was during difficulties with Covid outbreaks at her facility.  It is in position at the facility, planned completion in the next few days.      PAST MEDICAL & SURGICAL HISTORY:   Past Medical History:   Diagnosis Date   • Epilepsy (CMS/HCC)    • Fracture, clavicle     left    • Shoulder fracture,  left       Past Surgical History:   Procedure Laterality Date   • OOPHORECTOMY           MEDICATIONS:  I have reviewed and reconciled the patients medication list in the patients chart at the skilled nursing facility today.      ALLERGIES:    No Known Allergies      SOCIAL HISTORY:    Social History     Socioeconomic History   • Marital status: Single     Spouse name: Not on file   • Number of children: Not on file   • Years of education: Not on file   • Highest education level: Not on file   Tobacco Use   • Smoking status: Never Smoker   • Smokeless tobacco: Never Used   Substance and Sexual Activity   • Alcohol use: No   • Drug use: No   • Sexual activity: Defer       FAMILY HISTORY:    Family History   Problem Relation Age of Onset   • Diabetes Mother    • Heart disease Mother    • Hypertension Mother    • Hypertension Father    • Diabetes Sister    • Hypertension Sister        REVIEW OF SYSTEMS:    Review of Systems  · Appetite: Fair []   Good [x]   Poor []   Weight Loss []  [x]  Weight Stable   Unavoidable Weight Loss []  Tolerating Tube Feeding []    Supplements Provided []   · Constitutional: Negative for fever, chills, diaphoresis or fatigue and weight change.   · HENT: No dysphagia; no changes to vision/hearing/smell/taste; no epistaxis  · Eyes: Negative for redness and visual disturbance.   · Respiratory: Negative for shortness of breath, chest pain, cough or chest tightness.   · Cardiovascular: Negative for chest pain and palpitations.   · Gastrointestinal: Negative for abdominal distention, abdominal pain and blood in stool.   · Endocrine: Negative for cold intolerance and heat intolerance.   · Genitourinary: Negative for difficulty urinating, dysuria and frequency.Negative for hematuria   · Musculoskeletal: Chronic myalgias and arthralgias.  · Integumentary: No open wounds, rash or concerning skin lesions  · Neurological: Negative for syncope, weakness and headaches.   · Hematological: Negative for  adenopathy. Does not bruise/bleed easily.   · Immunological: Negative for reported allergies or immunological disorders  · Psychological: No acute behavioral changes    PHYSICAL EXAMINATION:   VITAL SIGNS:   Vitals:    02/03/21 1119   BP: 112/64   Pulse: 60   Resp: 20   Temp: 98 °F (36.7 °C)   SpO2: 97%       Physical Exam      General Appearance:  [x]  Alert   [x]  Oriented x person  [x]  No acute distress     []  Confused  []  Disoriented   []  Comatose   Head:  Atraumatic and normocephalic, without obvious abnormality.   Eyes:         PERRLA, conjunctivae and sclerae normal, no Icterus. No pallor. Extra-occular movements are within normal limits.   Ears:  Ears appear intact with no abnormalities noted.   Throat: No oral lesions, no thrush, oral mucosa moist.   Neck: Supple, trachea midline, no thyromegaly, no carotid bruit.   Back:   No kyphoscoliosis. No tenderness to palpation.   Lungs:   Chest shape is normal. Breath sounds heard bilaterally equally.  No wheezing.    Audible air exchange noted all lung fields.   Heart:  Normal S1 and S2, no murmur, no gallop, no rub. No JVD.   Abdomen:   Normal bowel sounds, no masses, no organomegaly. Soft, non-tender, non-distended, no guarding.  Mild truncal obesity.   Extremities: Moves all extremities; without edema, cyanosis or clubbing.    Poor core strength and stability.   Requires assistance with transfers at times.  Utilizes walker for assistance with ambulation.  Mild medial/lateral joint line tenderness of the left knee, mild effusion noted to the medial and lateral gutters.  Quadriceps mechanism intact.  Johnathan/Nguyen test negative.  Symmetric and appropriate dorsiflexion and plantar flexion of bilateral feet.   Pulses: Pulses palpable and equal bilaterally.   Skin: No bleeding or rash.  Generalized dry skin noted.  Age-related atrophy of skin.   Neurologic: [x] Normal speech []  Normal mental status    [x] Cranial nerves II through XII intact   [x]  No anosmia  [x]  DTR 2+ [x]  Proprioception intact  [x]  No focal motor/sensory deficits      Psych/Mood:                    [x]  No acute changes []  Depressed  Urinary:                            [x]  Continent  []  Incontinent []  Retention  []  F/C      []  UTI w/treatment in progress           ASSESSMENT     Diagnoses and all orders for this visit:    1. Impaired mobility and ADLs (Primary)    2. Primary osteoarthritis of both knees    3. Vitamin D deficiency    4. Nonintractable generalized idiopathic epilepsy without status epilepticus (CMS/HCC)    5. Colon cancer screening          PLAN  Continue supportive care for any mobilization/transfer assistance, patient does not ambulate predominantly with self-propelled wheelchair, and does have the ability to bear some weight independently at times.    Continue to follow her overall nutritional status, maintain appropriate hydration status.    Pain appears clinically well controlled with respect to bilateral knees, consider viscosupplementation series.    Continue current antiepileptic treatment regimen, surveillance labs as needed.    Encourage patient to complete her Cologuard in the next few days.    [x]  Discussed Patient in detail with nursing/staff, addressed all needs today.     [x]  Plan of Care Reviewed   []  PT/OT Reviewed   []  Order Changes  []  Discharge Plans Reviewed   []  Code Status Changes    I spent 30 minutes caring for Viviane on this date of service. This time includes time spent by me in the following activities:preparing for the visit, reviewing tests, performing a medically appropriate examination and/or evaluation , counseling and educating the patient/family/caregiver, ordering medications, tests, or procedures, documenting information in the medical record and care coordination    I have reviewed and updated all copied forward information, as appropriate.  I attest to the accuracy and relevance of any unchanged information.       Luigi Urena  DO  2/3/2021

## 2021-02-17 ENCOUNTER — NURSING HOME (OUTPATIENT)
Dept: FAMILY MEDICINE CLINIC | Facility: CLINIC | Age: 66
End: 2021-02-17

## 2021-02-17 DIAGNOSIS — Z78.9 IMPAIRED MOBILITY AND ADLS: Primary | Chronic | ICD-10-CM

## 2021-02-17 DIAGNOSIS — M17.0 PRIMARY OSTEOARTHRITIS OF BOTH KNEES: ICD-10-CM

## 2021-02-17 DIAGNOSIS — G40.309 NONINTRACTABLE GENERALIZED IDIOPATHIC EPILEPSY WITHOUT STATUS EPILEPTICUS (HCC): Chronic | ICD-10-CM

## 2021-02-17 DIAGNOSIS — Z74.09 IMPAIRED MOBILITY AND ADLS: Primary | Chronic | ICD-10-CM

## 2021-02-17 PROCEDURE — 99309 SBSQ NF CARE MODERATE MDM 30: CPT | Performed by: FAMILY MEDICINE

## 2021-02-18 VITALS
HEART RATE: 76 BPM | BODY MASS INDEX: 37.68 KG/M2 | DIASTOLIC BLOOD PRESSURE: 76 MMHG | TEMPERATURE: 98.2 F | SYSTOLIC BLOOD PRESSURE: 134 MMHG | RESPIRATION RATE: 20 BRPM | WEIGHT: 206 LBS | OXYGEN SATURATION: 93 %

## 2021-02-18 PROBLEM — S62.601A: Status: RESOLVED | Noted: 2019-01-16 | Resolved: 2021-02-18

## 2021-02-18 NOTE — PROGRESS NOTES
Nursing Home Progress Note        Luigi Urena DO [x]  LATONIA Tony []  [address] [place] [address]  Phone: [phone]  Fax: [phone] Marcell Christina MD []  Rm Tran DO []  [address] [place] [address]  Phone: [phone]  Fax: [phone]     PATIENT NAME: Viviane Peñaloza                                                                          YOB: 1955           DATE OF SERVICE: 2/17/2021  FACILITY: []  [facility]  [] [place]  [x]  [facility]  [] [facility]  []  Other ______________________________________________________________________     CHIEF COMPLAINT:  Chronic medical management and long-term care/seizure activity      HISTORY OF PRESENT ILLNESS:   [x]  Follow Up visit for coordination of long term care issues and chronic medical management of Diagnoses and all orders for this visit:    1. Impaired mobility and ADLs (Primary)    2. Nonintractable generalized idiopathic epilepsy without status epilepticus (CMS/HCC)    3. Primary osteoarthritis of both knees    Patient continues to be receptive/responsive to supportive care for mobilization and transfers, no falls or injuries reported.  Utilizes staff for assistance with ADLs additionally.  Tolerating all p.o. intake, has been well hydrated and nourished per review of records.    Vital signs have been stable.    No seizure-like activity since last visit.    Continues to be limited with mobility as result of primary osteoarthritis of bilateral knees and associated pain.      PAST MEDICAL & SURGICAL HISTORY:   Past Medical History:   Diagnosis Date   • Epilepsy (CMS/HCC)    • Fracture of unspecified phalanx of left index finger, initial encounter for closed fracture 1/16/2019   • Fracture, clavicle     left    • Shoulder fracture, left       Past Surgical History:   Procedure Laterality Date   • OOPHORECTOMY           MEDICATIONS:  I have reviewed and reconciled the patients medication list in the  patients chart at the skilled nursing facility today.      ALLERGIES:    No Known Allergies      SOCIAL HISTORY:    Social History     Socioeconomic History   • Marital status: Single     Spouse name: Not on file   • Number of children: Not on file   • Years of education: Not on file   • Highest education level: Not on file   Tobacco Use   • Smoking status: Never Smoker   • Smokeless tobacco: Never Used   Substance and Sexual Activity   • Alcohol use: No   • Drug use: No   • Sexual activity: Defer       FAMILY HISTORY:    Family History   Problem Relation Age of Onset   • Diabetes Mother    • Heart disease Mother    • Hypertension Mother    • Hypertension Father    • Diabetes Sister    • Hypertension Sister        REVIEW OF SYSTEMS:    Review of Systems  · Appetite: Fair []   Good [x]   Poor []   Weight Loss []  [x]  Weight Stable   Unavoidable Weight Loss []  Tolerating Tube Feeding []    Supplements Provided []   · Constitutional: Negative for fever, chills, diaphoresis or fatigue and weight change.   · HENT: No dysphagia; no changes to vision/hearing/smell/taste; no epistaxis  · Eyes: Negative for redness and visual disturbance.   · Respiratory: Negative for shortness of breath, chest pain, cough or chest tightness.   · Cardiovascular: Negative for chest pain and palpitations.   · Gastrointestinal: Negative for abdominal distention, abdominal pain and blood in stool.   · Endocrine: Negative for cold intolerance and heat intolerance.   · Genitourinary: Negative for difficulty urinating, dysuria and frequency.Negative for hematuria   · Musculoskeletal: Chronic myalgias and arthralgias, worse to the knees.  · Integumentary: No open wounds, rash or concerning skin lesions  · Neurological: Negative for syncope, weakness and headaches.   · Hematological: Negative for adenopathy. Does not bruise/bleed easily.   · Immunological: Negative for reported allergies or immunological disorders  · Psychological: No acute  behavioral changes    PHYSICAL EXAMINATION:   VITAL SIGNS:   Vitals:    02/18/21 1243   BP: 134/76   Pulse: 76   Resp: 20   Temp: 98.2 °F (36.8 °C)   SpO2: 93%       Physical Exam      General Appearance:  [x]  Alert   [x]  Oriented x person  [x]  No acute distress     []  Confused  []  Disoriented   []  Comatose   Head:  Atraumatic and normocephalic, without obvious abnormality.   Eyes:         PERRLA, conjunctivae and sclerae normal, no Icterus. No pallor. Extra-occular movements are within normal limits.   Ears:  Ears appear intact with no abnormalities noted.   Throat: No oral lesions, no thrush, oral mucosa moist.   Neck: Supple, trachea midline, no thyromegaly, no carotid bruit.   Back:   No kyphoscoliosis. No tenderness to palpation.   Lungs:   Chest shape is normal. Breath sounds heard bilaterally equally.  No wheezing.    Audible air exchange noted all lung fields.   Heart:  Normal S1 and S2, no murmur, no gallop, no rub. No JVD.   Abdomen:   Normal bowel sounds, no masses, no organomegaly. Soft, non-tender, non-distended, no guarding.  Mild truncal obesity.   Extremities: Moves all extremities; without edema, cyanosis or clubbing.    Poor core strength and stability.   Requires assistance with transfers at times.  Utilizes walker for assistance with ambulation.  Mild medial/lateral joint line tenderness of the left knee, mild effusion noted to the medial and lateral gutters.  Quadriceps mechanism intact.  Johnathan/Nguyen test negative.  Symmetric and appropriate dorsiflexion and plantar flexion of bilateral feet.   Pulses: Pulses palpable and equal bilaterally.   Skin: No bleeding or rash.  Generalized dry skin noted.  Age-related atrophy of skin.   Neurologic: [x] Normal speech []  Normal mental status    [x] Cranial nerves II through XII intact   [x]  No anosmia [x]  DTR 2+ [x]  Proprioception intact  [x]  No focal motor/sensory deficits      Psych/Mood:                    [x]  No acute changes []   Depressed  Urinary:                            [x]  Continent  []  Incontinent []  Retention  []  F/C      []  UTI w/treatment in progress           ASSESSMENT     Diagnoses and all orders for this visit:    1. Impaired mobility and ADLs (Primary)    2. Nonintractable generalized idiopathic epilepsy without status epilepticus (CMS/HCC)    3. Primary osteoarthritis of both knees          PLAN  Continue current supportive care for any mobilization/transfer assistance, or ADLs if needed.  Follow her nutritional intake, appears well-hydrated and nourished at the time of my exam.    Vital signs stable, blood pressure is at goal.  Appears hemodynamically asymptomatic.    Continue to monitor progression of her osteoarthritis of bilateral knees, steroid injections periodically if indicated.    Surveillance labs when needed.  Continue current antiepileptic treatment regimen.    [x]  Discussed Patient in detail with nursing/staff, addressed all needs today.     [x]  Plan of Care Reviewed   []  PT/OT Reviewed   []  Order Changes  []  Discharge Plans Reviewed   []  Code Status Changes    I spent 25 minutes caring for Viviane on this date of service. This time includes time spent by me in the following activities:preparing for the visit, performing a medically appropriate examination and/or evaluation , counseling and educating the patient/family/caregiver, ordering medications, tests, or procedures, documenting information in the medical record and care coordination    I have reviewed and updated all copied forward information, as appropriate.  I attest to the accuracy and relevance of any unchanged information.       Luigi Urena DO  2/17/2021

## 2021-03-05 ENCOUNTER — NURSING HOME (OUTPATIENT)
Dept: FAMILY MEDICINE CLINIC | Facility: CLINIC | Age: 66
End: 2021-03-05

## 2021-03-05 VITALS
HEART RATE: 67 BPM | DIASTOLIC BLOOD PRESSURE: 57 MMHG | TEMPERATURE: 98.4 F | SYSTOLIC BLOOD PRESSURE: 107 MMHG | OXYGEN SATURATION: 96 % | RESPIRATION RATE: 20 BRPM

## 2021-03-05 DIAGNOSIS — R56.9 SEIZURE (HCC): Primary | ICD-10-CM

## 2021-03-05 DIAGNOSIS — G40.309 NONINTRACTABLE GENERALIZED IDIOPATHIC EPILEPSY WITHOUT STATUS EPILEPTICUS (HCC): Chronic | ICD-10-CM

## 2021-03-05 DIAGNOSIS — Z74.09 IMPAIRED MOBILITY AND ADLS: Chronic | ICD-10-CM

## 2021-03-05 DIAGNOSIS — Z78.9 IMPAIRED MOBILITY AND ADLS: Chronic | ICD-10-CM

## 2021-03-05 PROCEDURE — 99309 SBSQ NF CARE MODERATE MDM 30: CPT | Performed by: NURSE PRACTITIONER

## 2021-03-08 NOTE — PROGRESS NOTES
Nursing Home Follow Up Note      Luigi Urena DO []   LATONIA Tony [x]  [address] [place] [address]  Phone: [phone]  Fax: [phone] Marcell Christina MD []    Rm Tran DO []   [address] [place] [address]  Phone: [phone]  Fax: [phone]     PATIENT NAME: Viviane Peñaloza                                                                          YOB: 1955           DATE OF SERVICE: 3/5/2021  FACILITY:  [][facility]   [] [facility]   [x] [facility]   [] [facility]   [] Other ______________________________________________________________________      CHIEF COMPLAINT:    Seizure activity      HISTORY OF PRESENT ILLNESS:     Patient had a seizure while in the shower this morning.  She had seizure activity then became weak and limp.  Nursing staff tried her off and sent her in chair and nurse placed a magnet over her vagal nerve stimulatior device in her chest.  She recovered within a few minutes and reports she is feeling fine now.  She is sitting up in her bed working a word puzzle at this time.  She has no other complaints.    PAST MEDICAL & SURGICAL HISTORY:   Past Medical History:   Diagnosis Date   • Epilepsy (CMS/[facility])    • Fracture of unspecified phalanx of left index finger, initial encounter for closed fracture 1/16/2019   • Fracture, clavicle     left    • Shoulder fracture, left       Past Surgical History:   Procedure Laterality Date   • OOPHORECTOMY           MEDICATIONS:  I have reviewed and reconciled the patients medication list in the patients chart at the skilled nursing facility today.      ALLERGIES:  No Known Allergies      SOCIAL HISTORY:  Social History     Socioeconomic History   • Marital status: Single     Spouse name: Not on file   • Number of children: Not on file   • Years of education: Not on file   • Highest education level: Not on file   Tobacco Use   • Smoking status: Never Smoker   • Smokeless tobacco: Never Used   Substance and  Sexual Activity   • Alcohol use: No   • Drug use: No   • Sexual activity: Defer       FAMILY HISTORY:  Family History   Problem Relation Age of Onset   • Diabetes Mother    • Heart disease Mother    • Hypertension Mother    • Hypertension Father    • Diabetes Sister    • Hypertension Sister        REVIEW OF SYSTEMS:  Review of Systems   Constitutional: Negative for activity change, appetite change, chills, diaphoresis, fatigue, fever, unexpected weight gain and unexpected weight loss.   HENT: Negative for congestion, ear pain, mouth sores, nosebleeds, postnasal drip, rhinorrhea, sinus pressure, sneezing, sore throat, swollen glands and trouble swallowing.    Eyes: Negative for blurred vision, pain, discharge, redness, itching and visual disturbance.   Respiratory: Negative for apnea, cough, choking, chest tightness, shortness of breath, wheezing and stridor.    Cardiovascular: Negative for chest pain, palpitations and leg swelling.   Gastrointestinal: Negative for abdominal distention, abdominal pain, blood in stool, constipation, diarrhea, nausea, vomiting, GERD and indigestion.   Endocrine: Negative for polydipsia and polyuria.   Genitourinary: Negative for decreased urine volume, difficulty urinating, dysuria, flank pain, frequency, hematuria, urgency and urinary incontinence.   Musculoskeletal: Positive for arthralgias, gait problem and myalgias. Negative for back pain and joint swelling.   Skin: Negative for color change, dry skin, rash and skin lesions.   Allergic/Immunologic: Negative for environmental allergies.   Neurological: Positive for seizures and weakness. Negative for dizziness, speech difficulty, memory problem and confusion.   Psychiatric/Behavioral: Negative for behavioral problems, dysphoric mood, hallucinations, sleep disturbance and depressed mood. The patient is not nervous/anxious.          PHYSICAL EXAMINATION:   VITAL SIGNS:     Vitals:    03/05/21 1548   BP: 107/57   Pulse: 67   Resp: 20    Temp: 98.4 °F (36.9 °C)   SpO2: 96%       Physical Exam   Constitutional: She appears well-developed and well-nourished.   HENT:   Head: Normocephalic.   Right Ear: External ear normal.   Left Ear: External ear normal.   Nose: Nose normal.   Eyes: Conjunctivae are normal.   Cardiovascular: Normal rate.   Pulmonary/Chest: Effort normal. No respiratory distress.   Abdominal: She exhibits no distension.   Musculoskeletal: No deformity.      Right knee: She exhibits normal range of motion, no swelling, no effusion, no ecchymosis, no deformity, no erythema and normal alignment. No patellar tendon tenderness noted.      Left knee: She exhibits decreased range of motion. Tenderness found.      Comments: Painful ROM of left knee     Lower extremity weakness   Neurological: She is alert.   Skin: Skin is warm and dry. No rash noted.   Psychiatric: Her behavior is normal.   Nursing note and vitals reviewed.      RECORDS REVIEW:   I have reviewed and interpreted the most recent labs    ASSESSMENT     Diagnoses and all orders for this visit:    1. Seizure (CMS/HCC) (Primary)    2. Nonintractable generalized idiopathic epilepsy without status epilepticus (CMS/HCC)    3. Impaired mobility and ADLs        PLAN     Seizure/epilepsy   Patient had a seizure while in the shower this morning.  She had seizure activity then became weak and limp.  Nursing staff tried her off and sent her in chair and nurse placed a magnet over her vagal nerve stimulatior device in her chest.  She recovered within a few minutes and reports she is feeling fine now.  She is sitting up in her bed working a word puzzle at this time.  She has no other complaints.  Nursing to notify neurology of seizure.  Will follow-up and continue to monitor.    Continue supportive care as needed for mobilization and any assistance with ADLs.         [x]  Discussed Patient in detail with nursing/staff, addressed all needs today.     [x]  Plan of Care Reviewed   []  PT/OT  Reviewed   [x]  Order Changes  []  Discharge Plans Reviewed  [x]  Advance Directive on file with Nursing Home.   [x]  POA on file with Nursing Home.   []  Code Status listed: [x]  Full Code   []  DNR       “I confirm accuracy of unchanged data/findings which have been carried forward from previous visit, as well as I have updated appropriately those that have changed.”                  Marlene Garcia, APRN.

## 2021-03-16 ENCOUNTER — NURSING HOME (OUTPATIENT)
Dept: FAMILY MEDICINE CLINIC | Facility: CLINIC | Age: 66
End: 2021-03-16

## 2021-03-16 VITALS
HEART RATE: 62 BPM | TEMPERATURE: 98 F | BODY MASS INDEX: 37.31 KG/M2 | WEIGHT: 204 LBS | RESPIRATION RATE: 20 BRPM | DIASTOLIC BLOOD PRESSURE: 62 MMHG | SYSTOLIC BLOOD PRESSURE: 101 MMHG | OXYGEN SATURATION: 93 %

## 2021-03-16 DIAGNOSIS — M17.0 PRIMARY OSTEOARTHRITIS OF BOTH KNEES: Primary | ICD-10-CM

## 2021-03-16 DIAGNOSIS — M25.562 CHRONIC PAIN OF BOTH KNEES: ICD-10-CM

## 2021-03-16 DIAGNOSIS — M25.561 CHRONIC PAIN OF BOTH KNEES: ICD-10-CM

## 2021-03-16 DIAGNOSIS — Z78.9 IMPAIRED MOBILITY AND ADLS: Chronic | ICD-10-CM

## 2021-03-16 DIAGNOSIS — G89.29 CHRONIC PAIN OF BOTH KNEES: ICD-10-CM

## 2021-03-16 DIAGNOSIS — Z74.09 IMPAIRED MOBILITY AND ADLS: Chronic | ICD-10-CM

## 2021-03-16 PROCEDURE — 99309 SBSQ NF CARE MODERATE MDM 30: CPT | Performed by: NURSE PRACTITIONER

## 2021-03-17 ENCOUNTER — DOCUMENTATION (OUTPATIENT)
Dept: FAMILY MEDICINE CLINIC | Facility: CLINIC | Age: 66
End: 2021-03-17
Payer: MEDICARE

## 2021-03-17 VITALS
DIASTOLIC BLOOD PRESSURE: 57 MMHG | BODY MASS INDEX: 37.31 KG/M2 | OXYGEN SATURATION: 97 % | TEMPERATURE: 97.8 F | SYSTOLIC BLOOD PRESSURE: 109 MMHG | RESPIRATION RATE: 20 BRPM | HEART RATE: 84 BPM | WEIGHT: 204 LBS

## 2021-03-18 NOTE — PROGRESS NOTES
Nursing Home Follow Up Note      Luigi Urena DO []   LATONIA Tony [x]  [address] [place] [address]  Phone: [phone]  Fax: [phone] Marcell Christina MD []    Rm Tran DO []   [address] [place] [address]  Phone: [phone]  Fax: [phone]     PATIENT NAME: Viviane Peñaloza                                                                          YOB: 1955           DATE OF SERVICE: 3/16/2021  FACILITY:  [][facility]   [] [facility]   [x] [facility]   [] [facility]   [] Other ______________________________________________________________________      CHIEF COMPLAINT:    Would like to discuss surgery for bilateral knees.      HISTORY OF PRESENT ILLNESS:     Patient has complaints of increased bilateral pain.  She has history of chronic knee pain but reports it has gotten worse the past several weeks and she is ready to see Ortho about having surgery.  She is not exactly sure yet where she wants to have the surgery, is going to discuss it more with her family and then will let us know.    PAST MEDICAL & SURGICAL HISTORY:   Past Medical History:   Diagnosis Date   • Epilepsy (CMS/HCC)    • Fracture of unspecified phalanx of left index finger, initial encounter for closed fracture 1/16/2019   • Fracture, clavicle     left    • Shoulder fracture, left       Past Surgical History:   Procedure Laterality Date   • OOPHORECTOMY           MEDICATIONS:  I have reviewed and reconciled the patients medication list in the patients chart at the skilled nursing facility today.      ALLERGIES:  No Known Allergies      SOCIAL HISTORY:  Social History     Socioeconomic History   • Marital status: Single     Spouse name: Not on file   • Number of children: Not on file   • Years of education: Not on file   • Highest education level: Not on file   Tobacco Use   • Smoking status: Never Smoker   • Smokeless tobacco: Never Used   Substance and Sexual Activity   • Alcohol use: No   •  Drug use: No   • Sexual activity: Defer       FAMILY HISTORY:  Family History   Problem Relation Age of Onset   • Diabetes Mother    • Heart disease Mother    • Hypertension Mother    • Hypertension Father    • Diabetes Sister    • Hypertension Sister        REVIEW OF SYSTEMS:  Review of Systems   Constitutional: Negative for activity change, appetite change, chills, diaphoresis, fatigue, fever, unexpected weight gain and unexpected weight loss.   HENT: Negative for congestion, ear pain, mouth sores, nosebleeds, postnasal drip, rhinorrhea, sinus pressure, sneezing, sore throat, swollen glands and trouble swallowing.    Eyes: Negative for blurred vision, pain, discharge, redness, itching and visual disturbance.   Respiratory: Negative for apnea, cough, choking, chest tightness, shortness of breath, wheezing and stridor.    Cardiovascular: Negative for chest pain, palpitations and leg swelling.   Gastrointestinal: Negative for abdominal distention, abdominal pain, blood in stool, constipation, diarrhea, nausea, vomiting, GERD and indigestion.   Endocrine: Negative for polydipsia and polyuria.   Genitourinary: Negative for decreased urine volume, difficulty urinating, dysuria, flank pain, frequency, hematuria, urgency and urinary incontinence.   Musculoskeletal: Positive for arthralgias, gait problem and myalgias. Negative for back pain and joint swelling.   Skin: Negative for color change, dry skin, rash and skin lesions.   Allergic/Immunologic: Negative for environmental allergies.   Neurological: Positive for seizures and weakness. Negative for dizziness, speech difficulty, memory problem and confusion.   Psychiatric/Behavioral: Negative for behavioral problems, dysphoric mood, hallucinations, sleep disturbance and depressed mood. The patient is not nervous/anxious.          PHYSICAL EXAMINATION:   VITAL SIGNS:     Vitals:    03/16/21 1444   BP: 101/62   Pulse: 62   Resp: 20   Temp: 98 °F (36.7 °C)   SpO2: 93%    Weight: 92.5 kg (204 lb)       Physical Exam   Constitutional: She appears well-developed and well-nourished.   HENT:   Head: Normocephalic.   Right Ear: External ear normal.   Left Ear: External ear normal.   Nose: Nose normal.   Eyes: Conjunctivae are normal.   Cardiovascular: Normal rate.   Pulmonary/Chest: Effort normal. No respiratory distress.   Abdominal: She exhibits no distension.   Musculoskeletal: No deformity.      Right knee: She exhibits decreased range of motion and swelling. She exhibits no effusion, no ecchymosis, no deformity, no erythema and normal alignment. Tenderness found. No patellar tendon tenderness noted.      Left knee: She exhibits decreased range of motion and swelling. Tenderness found.      Comments: Painful ROM of left knee     Lower extremity weakness   Neurological: She is alert.   Skin: Skin is warm and dry. No rash noted.   Psychiatric: Her behavior is normal.   Nursing note and vitals reviewed.      RECORDS REVIEW:   I have reviewed and interpreted the most recent labs    ASSESSMENT     Diagnoses and all orders for this visit:    1. Primary osteoarthritis of both knees (Primary)    2. Chronic pain of both knees    3. Impaired mobility and ADLs        PLAN     OA bilateral knees/chronic pain bilateral knees   -Patient with history of arthritis and chronic pain to bilateral knees. She has had multiple injections to both knees but pain continues. She would like referred to Ortho for evaluation for knee surgery. She is going to discuss this with her family and determine which surgeon she is going to use, and will inform nursing. Will place referral when known. Will follow up and continue to monitor.     Patient was encouraged to keep me informed of any acute changes, lack of improvement, or any new concerning symptoms.    Continue supportive care as needed for mobilization and any assistance with ADLs.         [x]  Discussed Patient in detail with nursing/staff, addressed all  needs today.     [x]  Plan of Care Reviewed   []  PT/OT Reviewed   [x]  Order Changes  []  Discharge Plans Reviewed  [x]  Advance Directive on file with Nursing Home.   [x]  POA on file with Nursing Home.   []  Code Status listed: [x]  Full Code   []  DNR       “I confirm accuracy of unchanged data/findings which have been carried forward from previous visit, as well as I have updated appropriately those that have changed.”                  Marlene Garcia, APRN.

## 2021-04-21 ENCOUNTER — NURSING HOME (OUTPATIENT)
Dept: FAMILY MEDICINE CLINIC | Facility: CLINIC | Age: 66
End: 2021-04-21

## 2021-04-21 VITALS
TEMPERATURE: 98 F | HEART RATE: 65 BPM | OXYGEN SATURATION: 93 % | SYSTOLIC BLOOD PRESSURE: 119 MMHG | BODY MASS INDEX: 37.68 KG/M2 | DIASTOLIC BLOOD PRESSURE: 65 MMHG | WEIGHT: 206 LBS | RESPIRATION RATE: 20 BRPM

## 2021-04-21 DIAGNOSIS — Z78.9 IMPAIRED MOBILITY AND ADLS: Primary | Chronic | ICD-10-CM

## 2021-04-21 DIAGNOSIS — M17.0 PRIMARY OSTEOARTHRITIS OF BOTH KNEES: ICD-10-CM

## 2021-04-21 DIAGNOSIS — G40.309 NONINTRACTABLE GENERALIZED IDIOPATHIC EPILEPSY WITHOUT STATUS EPILEPTICUS (HCC): Chronic | ICD-10-CM

## 2021-04-21 DIAGNOSIS — E55.9 VITAMIN D DEFICIENCY: ICD-10-CM

## 2021-04-21 DIAGNOSIS — Z74.09 IMPAIRED MOBILITY AND ADLS: Primary | Chronic | ICD-10-CM

## 2021-04-21 PROCEDURE — 99309 SBSQ NF CARE MODERATE MDM 30: CPT | Performed by: FAMILY MEDICINE

## 2021-04-21 NOTE — PROGRESS NOTES
Nursing Home Progress Note        Luigi Urena DO [x]  LATONIA Tony []  [address] [place] [address]  Phone: [phone]  Fax: [phone] Marcell Christina MD []  Rm Tran DO []  [address] [place] [address]  Phone: [phone]  Fax: [phone]     PATIENT NAME: Viviane Peñaloza                                                                          YOB: 1955           DATE OF SERVICE: 4/21/2021  FACILITY: []  [facility]  [] [facility]  [x]  [facility]  [] [facility]  []  Other ______________________________________________________________________     CHIEF COMPLAINT:  Chronic medical management and long-term care/seizure activity      HISTORY OF PRESENT ILLNESS:   [x]  Follow Up visit for coordination of long term care issues and chronic medical management of Diagnoses and all orders for this visit:    1. Impaired mobility and ADLs (Primary)    2. Nonintractable generalized idiopathic epilepsy without status epilepticus (CMS/HCC)    3. Primary osteoarthritis of both knees    4. Vitamin D deficiency    Patient remains receptive to supportive care for mobilization/transfer assistance when needed, as well as ADLs.  Nutritionally has done well, has been well hydrated additionally.  She is sleeping well.  No reports of any falls or injuries.    Patient is participating with physical therapy for reconditioning and range of motion/quadricep strengthening.    No reports of any seizure-like activity.    Pain is been adequately controlled.  Vital signs have been stable.      PAST MEDICAL & SURGICAL HISTORY:   Past Medical History:   Diagnosis Date   • Epilepsy (CMS/HCC)    • Fracture of unspecified phalanx of left index finger, initial encounter for closed fracture 1/16/2019   • Fracture, clavicle     left    • Shoulder fracture, left       Past Surgical History:   Procedure Laterality Date   • OOPHORECTOMY           MEDICATIONS:  I have reviewed and reconciled the  patients medication list in the patients chart at the skilled nursing facility today.      ALLERGIES:    No Known Allergies      SOCIAL HISTORY:    Social History     Socioeconomic History   • Marital status: Single     Spouse name: Not on file   • Number of children: Not on file   • Years of education: Not on file   • Highest education level: Not on file   Tobacco Use   • Smoking status: Never Smoker   • Smokeless tobacco: Never Used   Substance and Sexual Activity   • Alcohol use: No   • Drug use: No   • Sexual activity: Defer       FAMILY HISTORY:    Family History   Problem Relation Age of Onset   • Diabetes Mother    • Heart disease Mother    • Hypertension Mother    • Hypertension Father    • Diabetes Sister    • Hypertension Sister        REVIEW OF SYSTEMS:    Review of Systems  · Appetite: Fair []   Good [x]   Poor []   Weight Loss []  [x]  Weight Stable   Unavoidable Weight Loss []  Tolerating Tube Feeding []    Supplements Provided []   · Constitutional: Negative for fever, chills, diaphoresis or fatigue and weight change.   · HENT: No dysphagia; no changes to vision/hearing/smell/taste; no epistaxis  · Eyes: Negative for redness and visual disturbance.   · Respiratory: Negative for shortness of breath, chest pain, cough or chest tightness.   · Cardiovascular: Negative for chest pain and palpitations.   · Gastrointestinal: Negative for abdominal distention, abdominal pain and blood in stool.   · Endocrine: Negative for cold intolerance and heat intolerance.   · Genitourinary: Negative for difficulty urinating, dysuria and frequency.Negative for hematuria   · Musculoskeletal: Chronic myalgias and arthralgias, worse to the knees.  · Integumentary: No open wounds, rash or concerning skin lesions  · Neurological: Negative for syncope, weakness and headaches.   · Hematological: Negative for adenopathy. Does not bruise/bleed easily.   · Immunological: Negative for reported allergies or immunological  disorders  · Psychological: No acute behavioral changes    PHYSICAL EXAMINATION:   VITAL SIGNS:   There were no vitals filed for this visit.    Physical Exam      General Appearance:  [x]  Alert   [x]  Oriented x person  [x]  No acute distress     []  Confused  []  Disoriented   []  Comatose   Head:  Atraumatic and normocephalic, without obvious abnormality.   Eyes:         PERRLA, conjunctivae and sclerae normal, no Icterus. No pallor. Extra-occular movements are within normal limits.   Ears:  Ears appear intact with no abnormalities noted.   Throat: No oral lesions, no thrush, oral mucosa moist.   Neck: Supple, trachea midline, no thyromegaly, no carotid bruit.   Back:   No kyphoscoliosis. No tenderness to palpation.   Lungs:   Chest shape is normal. Breath sounds heard bilaterally equally.  No wheezing.    Audible air exchange noted all lung fields.   Heart:  Normal S1 and S2, no murmur, no gallop, no rub. No JVD.   Abdomen:   Normal bowel sounds, no masses, no organomegaly. Soft, non-tender, non-distended, no guarding.  Mild truncal obesity.   Extremities: Moves all extremities; without edema, cyanosis or clubbing.    Poor core strength and stability.   Requires assistance with transfers at times.  Utilizes walker for assistance with ambulation.  Mild medial/lateral joint line tenderness of the left knee, mild effusion noted to the medial and lateral gutters.  Quadriceps mechanism intact.  Johnathan/Nguyen test negative.  Symmetric and appropriate dorsiflexion and plantar flexion of bilateral feet.   Pulses: Pulses palpable and equal bilaterally.   Skin: No bleeding or rash.  Generalized dry skin noted.  Age-related atrophy of skin.   Neurologic: [x] Normal speech []  Normal mental status    [x] Cranial nerves II through XII intact   [x]  No anosmia [x]  DTR 2+ [x]  Proprioception intact  [x]  No focal motor/sensory deficits      Psych/Mood:                    [x]  No acute changes []  Depressed  Urinary:                             [x]  Continent  []  Incontinent []  Retention  []  F/C      []  UTI w/treatment in progress           ASSESSMENT     Diagnoses and all orders for this visit:    1. Impaired mobility and ADLs (Primary)    2. Nonintractable generalized idiopathic epilepsy without status epilepticus (CMS/HCC)    3. Primary osteoarthritis of both knees    4. Vitamin D deficiency          PLAN  Continue supportive care for any needed mobilization/transfer assistance.  Continue fall precautions.  Patient does ambulate relatively independently with utilization of a Rollator at times.  Continue functional strengthening/stabilization exercise program for reconditioning.    Nutritionally she is doing well, no reported deficits or hydration issues.    Vital signs demonstrate hemodynamic stability, blood pressure is at goal.    Continue current antiepileptic regimen.  Surveillance labs when needed.    [x]  Discussed Patient in detail with nursing/staff, addressed all needs today.     [x]  Plan of Care Reviewed   []  PT/OT Reviewed   []  Order Changes  []  Discharge Plans Reviewed   []  Code Status Changes    I spent 30 minutes caring for Viviane on this date of service. This time includes time spent by me in the following activities:preparing for the visit, performing a medically appropriate examination and/or evaluation , counseling and educating the patient/family/caregiver, ordering medications, tests, or procedures, documenting information in the medical record and care coordination    I have reviewed and updated all copied forward information, as appropriate.  I attest to the accuracy and relevance of any unchanged information.       Luigi Urena DO  4/21/2021

## 2021-04-26 RX ORDER — OXYCODONE HYDROCHLORIDE 5 MG/1
5 TABLET ORAL EVERY 6 HOURS PRN
Qty: 120 TABLET | Refills: 0 | Status: SHIPPED | OUTPATIENT
Start: 2021-04-26 | End: 2021-08-26

## 2021-04-26 RX ORDER — CLONAZEPAM 0.5 MG/1
TABLET ORAL
Qty: 90 TABLET | Refills: 5 | Status: SHIPPED | OUTPATIENT
Start: 2021-04-26 | End: 2021-05-17 | Stop reason: SDUPTHER

## 2021-04-26 NOTE — TELEPHONE ENCOUNTER
[facility] MEDICATION REFILL REQUEST FOR CLONAZEPAM 0.5 MG AND OXYCODONE 5 MG.    CLONAZEPAM DIRECTIONS: TAKE 1 TAB PO QAM, AND TAKE 2 TABS PO QHS.    OXYCODONE DIRECTIONS: TAKE 1 TAB PO Q 6 HRS PRN

## 2021-05-17 RX ORDER — CLONAZEPAM 0.5 MG/1
TABLET ORAL
Qty: 90 TABLET | Refills: 3 | Status: SHIPPED | OUTPATIENT
Start: 2021-05-17 | End: 2021-09-27 | Stop reason: SDUPTHER

## 2021-05-17 RX ORDER — CLONAZEPAM 0.5 MG/1
0.5 TABLET ORAL DAILY
Qty: 30 TABLET | Refills: 3 | Status: SHIPPED | OUTPATIENT
Start: 2021-05-17 | End: 2021-05-17 | Stop reason: SDUPTHER

## 2021-05-17 NOTE — TELEPHONE ENCOUNTER
[facility] NEW MEDICATION REQUEST FOR CLONAZEPAM 0.5 MG.    DIRECTIONS: TAKE 1 TABLET PO AM AND 2 TABS PO HS

## 2021-05-19 ENCOUNTER — DOCUMENTATION (OUTPATIENT)
Dept: FAMILY MEDICINE CLINIC | Facility: CLINIC | Age: 66
End: 2021-05-19
Payer: MEDICARE

## 2021-05-19 VITALS
DIASTOLIC BLOOD PRESSURE: 57 MMHG | SYSTOLIC BLOOD PRESSURE: 106 MMHG | RESPIRATION RATE: 20 BRPM | BODY MASS INDEX: 38.04 KG/M2 | OXYGEN SATURATION: 95 % | TEMPERATURE: 97.9 F | WEIGHT: 208 LBS | HEART RATE: 58 BPM

## 2021-05-21 DIAGNOSIS — G40.309 NONINTRACTABLE GENERALIZED IDIOPATHIC EPILEPSY WITHOUT STATUS EPILEPTICUS (HCC): Chronic | ICD-10-CM

## 2021-05-21 RX ORDER — PHENOBARBITAL 64.8 MG/1
64.8 TABLET ORAL 2 TIMES DAILY
Qty: 60 TABLET | Refills: 5 | Status: SHIPPED | OUTPATIENT
Start: 2021-05-21 | End: 2021-11-22 | Stop reason: SDUPTHER

## 2021-06-16 ENCOUNTER — NURSING HOME (OUTPATIENT)
Dept: FAMILY MEDICINE CLINIC | Facility: CLINIC | Age: 66
End: 2021-06-16

## 2021-06-16 VITALS
SYSTOLIC BLOOD PRESSURE: 119 MMHG | RESPIRATION RATE: 18 BRPM | WEIGHT: 209 LBS | DIASTOLIC BLOOD PRESSURE: 73 MMHG | TEMPERATURE: 97.6 F | BODY MASS INDEX: 38.23 KG/M2 | HEART RATE: 71 BPM | OXYGEN SATURATION: 97 %

## 2021-06-16 DIAGNOSIS — M25.562 ARTHRALGIA OF LEFT KNEE: ICD-10-CM

## 2021-06-16 DIAGNOSIS — M17.0 PRIMARY OSTEOARTHRITIS OF BOTH KNEES: ICD-10-CM

## 2021-06-16 DIAGNOSIS — Z74.09 IMPAIRED MOBILITY AND ADLS: Primary | Chronic | ICD-10-CM

## 2021-06-16 DIAGNOSIS — G40.309 NONINTRACTABLE GENERALIZED IDIOPATHIC EPILEPSY WITHOUT STATUS EPILEPTICUS (HCC): Chronic | ICD-10-CM

## 2021-06-16 DIAGNOSIS — Z78.9 IMPAIRED MOBILITY AND ADLS: Primary | Chronic | ICD-10-CM

## 2021-06-16 DIAGNOSIS — E55.9 VITAMIN D DEFICIENCY: ICD-10-CM

## 2021-06-16 PROCEDURE — 99309 SBSQ NF CARE MODERATE MDM 30: CPT | Performed by: FAMILY MEDICINE

## 2021-06-16 NOTE — PROGRESS NOTES
Nursing Home Progress Note        Luigi Urena DO [x]  LATONIA Tony []  [address] [place] [address]  Phone: [phone]  Fax: [phone] Marcell Christina MD []  mR Tran DO []  [address] [place] [address]  Phone: [phone]  Fax: [phone]     PATIENT NAME: Viviane Peñaloza                                                                          YOB: 1955           DATE OF SERVICE: 6/16/2021  FACILITY: []  [facility]  [] [facility]  [x]  [facility]  [] [facility]  []  Other ______________________________________________________________________     CHIEF COMPLAINT:  Chronic medical management and long-term care/seizure activity      HISTORY OF PRESENT ILLNESS:   [x]  Follow Up visit for coordination of long term care issues and chronic medical management of Diagnoses and all orders for this visit:    1. Impaired mobility and ADLs (Primary)    2. Primary osteoarthritis of both knees    3. Arthralgia of left knee    4. Nonintractable generalized idiopathic epilepsy without status epilepticus (CMS/HCC)    5. Vitamin D deficiency    Patient remains interactive and pleasant during questioning and examination.  She reports responsiveness to supportive care for any mobilization/transfer assistance that she needs, as well as ADLs at times.  Eating well, no nutritional deficits reported.  No hydration issues.  No new falls or injuries reported.    No reports of any seizure-like activity since last visit.    Signs have demonstrated hemodynamic stability.      PAST MEDICAL & SURGICAL HISTORY:   Past Medical History:   Diagnosis Date   • Epilepsy (CMS/HCC)    • Fracture of unspecified phalanx of left index finger, initial encounter for closed fracture 1/16/2019   • Fracture, clavicle     left    • Shoulder fracture, left       Past Surgical History:   Procedure Laterality Date   • OOPHORECTOMY           MEDICATIONS:  I have reviewed and reconciled the patients medication  list in the patients chart at the skilled nursing facility today.      ALLERGIES:    No Known Allergies      SOCIAL HISTORY:    Social History     Socioeconomic History   • Marital status: Single     Spouse name: Not on file   • Number of children: Not on file   • Years of education: Not on file   • Highest education level: Not on file   Tobacco Use   • Smoking status: Never Smoker   • Smokeless tobacco: Never Used   Substance and Sexual Activity   • Alcohol use: No   • Drug use: No   • Sexual activity: Defer       FAMILY HISTORY:    Family History   Problem Relation Age of Onset   • Diabetes Mother    • Heart disease Mother    • Hypertension Mother    • Hypertension Father    • Diabetes Sister    • Hypertension Sister        REVIEW OF SYSTEMS:    Review of Systems  · Appetite: Fair []   Good [x]   Poor []   Weight Loss []  [x]  Weight Stable   Unavoidable Weight Loss []  Tolerating Tube Feeding []    Supplements Provided []   · Constitutional: Negative for fever, chills, diaphoresis or fatigue and weight change.   · HENT: No dysphagia; no changes to vision/hearing/smell/taste; no epistaxis  · Eyes: Negative for redness and visual disturbance.   · Respiratory: Negative for shortness of breath, chest pain, cough or chest tightness.   · Cardiovascular: Negative for chest pain and palpitations.   · Gastrointestinal: Negative for abdominal distention, abdominal pain and blood in stool.   · Endocrine: Negative for cold intolerance and heat intolerance.   · Genitourinary: Negative for difficulty urinating, dysuria and frequency.Negative for hematuria   · Musculoskeletal: Chronic myalgias and arthralgias, worse to the knees.  · Integumentary: No open wounds, rash or concerning skin lesions  · Neurological: Negative for syncope, weakness and headaches.   · Hematological: Negative for adenopathy. Does not bruise/bleed easily.   · Immunological: Negative for reported allergies or immunological disorders  · Psychological: No  acute behavioral changes    PHYSICAL EXAMINATION:   VITAL SIGNS:   Vitals:    06/16/21 1101   BP: 119/73   Pulse: 71   Resp: 18   Temp: 97.6 °F (36.4 °C)   SpO2: 97%       Physical Exam      General Appearance:  [x]  Alert   [x]  Oriented x person  [x]  No acute distress     []  Confused  []  Disoriented   []  Comatose   Head:  Atraumatic and normocephalic, without obvious abnormality.   Eyes:         PERRLA, conjunctivae and sclerae normal, no Icterus. No pallor. Extra-occular movements are within normal limits.   Ears:  Ears appear intact with no abnormalities noted.   Throat: No oral lesions, no thrush, oral mucosa moist.   Neck: Supple, trachea midline, no thyromegaly, no carotid bruit.   Back:   No kyphoscoliosis. No tenderness to palpation.   Lungs:   Chest shape is normal. Breath sounds heard bilaterally equally.  No wheezing.    Audible air exchange noted all lung fields.   Heart:  Normal S1 and S2, no murmur, no gallop, no rub. No JVD.   Abdomen:   Normal bowel sounds, no masses, no organomegaly. Soft, non-tender, non-distended, no guarding.  Mild truncal obesity.   Extremities: Moves all extremities; without edema, cyanosis or clubbing.    Poor core strength and stability.   Requires assistance with transfers at times.  Utilizes walker for assistance with ambulation.  Mild medial/lateral joint line tenderness of the left knee, mild effusion noted to the medial and lateral gutters.  Quadriceps mechanism intact.  Johnathan/Nguyen test negative.  Symmetric and appropriate dorsiflexion and plantar flexion of bilateral feet.   Pulses: Pulses palpable and equal bilaterally.   Skin: No bleeding or rash.  Generalized dry skin noted.  Age-related atrophy of skin.   Neurologic: [x] Normal speech []  Normal mental status    [x] Cranial nerves II through XII intact   [x]  No anosmia [x]  DTR 2+ [x]  Proprioception intact  [x]  No focal motor/sensory deficits      Psych/Mood:                    [x]  No acute changes []   Depressed  Urinary:                            [x]  Continent  []  Incontinent []  Retention  []  F/C      []  UTI w/treatment in progress           ASSESSMENT     Diagnoses and all orders for this visit:    1. Impaired mobility and ADLs (Primary)    2. Primary osteoarthritis of both knees    3. Arthralgia of left knee    4. Nonintractable generalized idiopathic epilepsy without status epilepticus (CMS/HCC)    5. Vitamin D deficiency          PLAN  Plan continuation of any supportive care needed for mobilization/transfer assistance, as well as ADLs should they arise.  Nutritionally doing well, no hydration issues.  Continue to follow clinically.    Continue current analgesic regimen and treatment of her osteoarthritis of bilateral knees.  Steroid injections when indicated.    Continue current antiepileptic regimen, surveillance labs when needed.  Please with no seizure-like activity since last visit.    Signs demonstrate hemodynamic stability, blood pressure is at goal.  Continue dietary modifications in an effort to improve and overall healthier weight.    [x]  Discussed Patient in detail with nursing/staff, addressed all needs today.     [x]  Plan of Care Reviewed   []  PT/OT Reviewed   []  Order Changes  []  Discharge Plans Reviewed   []  Code Status Changes    I spent 30 minutes caring for Viviane on this date of service. This time includes time spent by me in the following activities:preparing for the visit, performing a medically appropriate examination and/or evaluation , counseling and educating the patient/family/caregiver, ordering medications, tests, or procedures, documenting information in the medical record and care coordination    I have reviewed and updated all copied forward information, as appropriate.  I attest to the accuracy and relevance of any unchanged information.       Luigi Urena DO  6/16/2021

## 2021-07-13 ENCOUNTER — NURSING HOME (OUTPATIENT)
Dept: FAMILY MEDICINE CLINIC | Facility: CLINIC | Age: 66
End: 2021-07-13

## 2021-07-13 VITALS
HEART RATE: 58 BPM | WEIGHT: 211 LBS | BODY MASS INDEX: 38.59 KG/M2 | DIASTOLIC BLOOD PRESSURE: 69 MMHG | OXYGEN SATURATION: 95 % | RESPIRATION RATE: 20 BRPM | SYSTOLIC BLOOD PRESSURE: 108 MMHG | TEMPERATURE: 97.7 F

## 2021-07-13 DIAGNOSIS — G89.29 CHRONIC PAIN OF BOTH KNEES: ICD-10-CM

## 2021-07-13 DIAGNOSIS — Z78.9 IMPAIRED MOBILITY AND ADLS: Chronic | ICD-10-CM

## 2021-07-13 DIAGNOSIS — M25.561 CHRONIC PAIN OF BOTH KNEES: ICD-10-CM

## 2021-07-13 DIAGNOSIS — M17.0 PRIMARY OSTEOARTHRITIS OF BOTH KNEES: ICD-10-CM

## 2021-07-13 DIAGNOSIS — Z74.09 IMPAIRED MOBILITY AND ADLS: Chronic | ICD-10-CM

## 2021-07-13 DIAGNOSIS — M25.562 CHRONIC PAIN OF BOTH KNEES: ICD-10-CM

## 2021-07-13 PROCEDURE — 99308 SBSQ NF CARE LOW MDM 20: CPT | Performed by: NURSE PRACTITIONER

## 2021-07-15 NOTE — PROGRESS NOTES
Nursing Home Follow Up Note      Luigi Urena DO []   LATONIA Tony [x]  [address] [place] [address]  Phone: [phone]  Fax: [phone] Marcell Christina MD []    Rm Tran DO []   [address] [place] [address]  Phone: [phone]  Fax: [phone]     PATIENT NAME: Viviane Peñaloza                                                                          YOB: 1955           DATE OF SERVICE: 7/13/2021  FACILITY:  [][facility]   [] [facility]   [x] [facility]   [] [facility]   [] Other ______________________________________________________________________      CHIEF COMPLAINT:    Follow up Ortho appointment       HISTORY OF PRESENT ILLNESS:     Patient saw Ortho on 7/7 for further evaluation of her chronic bilateral knee pain. There are plans for hardware from her left knee in 6 weeks then 6 weeks later a left TKA. 6 weeks after that she will have right TKA. Ortho also recommended weight loss and patient to continue to ambulate and be as mobile as possible until the surgery. She is very excited to have surgery to improve her pain and mobility. No other complaints today.    PAST MEDICAL & SURGICAL HISTORY:   Past Medical History:   Diagnosis Date   • Epilepsy (CMS/HCC)    • Fracture of unspecified phalanx of left index finger, initial encounter for closed fracture 1/16/2019   • Fracture, clavicle     left    • Shoulder fracture, left       Past Surgical History:   Procedure Laterality Date   • OOPHORECTOMY           MEDICATIONS:  I have reviewed and reconciled the patients medication list in the patients chart at the skilled nursing facility today.      ALLERGIES:  No Known Allergies      SOCIAL HISTORY:  Social History     Socioeconomic History   • Marital status: Single     Spouse name: Not on file   • Number of children: Not on file   • Years of education: Not on file   • Highest education level: Not on file   Tobacco Use   • Smoking status: Never Smoker   •  Smokeless tobacco: Never Used   Substance and Sexual Activity   • Alcohol use: No   • Drug use: No   • Sexual activity: Defer       FAMILY HISTORY:  Family History   Problem Relation Age of Onset   • Diabetes Mother    • Heart disease Mother    • Hypertension Mother    • Hypertension Father    • Diabetes Sister    • Hypertension Sister        REVIEW OF SYSTEMS:  Review of Systems   Constitutional: Negative for activity change, appetite change, chills, diaphoresis, fatigue, fever, unexpected weight gain and unexpected weight loss.   HENT: Negative for congestion, ear pain, mouth sores, nosebleeds, postnasal drip, rhinorrhea, sinus pressure, sneezing, sore throat, swollen glands and trouble swallowing.    Eyes: Negative for blurred vision, pain, discharge, redness, itching and visual disturbance.   Respiratory: Negative for apnea, cough, choking, chest tightness, shortness of breath, wheezing and stridor.    Cardiovascular: Negative for chest pain, palpitations and leg swelling.   Gastrointestinal: Negative for abdominal distention, abdominal pain, blood in stool, constipation, diarrhea, nausea, vomiting, GERD and indigestion.   Endocrine: Negative for polydipsia and polyuria.   Genitourinary: Negative for decreased urine volume, difficulty urinating, dysuria, flank pain, frequency, hematuria, urgency and urinary incontinence.   Musculoskeletal: Positive for arthralgias, gait problem and myalgias. Negative for back pain and joint swelling.   Skin: Negative for color change, dry skin, rash and skin lesions.   Allergic/Immunologic: Negative for environmental allergies.   Neurological: Positive for seizures and weakness. Negative for dizziness, speech difficulty, memory problem and confusion.   Psychiatric/Behavioral: Negative for behavioral problems, dysphoric mood, hallucinations, sleep disturbance and depressed mood. The patient is not nervous/anxious.          PHYSICAL EXAMINATION:   VITAL SIGNS:     Vitals:     07/13/21 1330   BP: 108/69   Pulse: 58   Resp: 20   Temp: 97.7 °F (36.5 °C)   SpO2: 95%   Weight: 95.7 kg (211 lb)       Physical Exam   Constitutional: She appears well-developed and well-nourished.   HENT:   Head: Normocephalic.   Right Ear: External ear normal.   Left Ear: External ear normal.   Nose: Nose normal.   Eyes: Conjunctivae are normal.   Cardiovascular: Normal rate.   Pulmonary/Chest: Effort normal. No respiratory distress.   Abdominal: She exhibits no distension.   Musculoskeletal: No deformity.      Right knee: She exhibits decreased range of motion and swelling. She exhibits no effusion, no ecchymosis, no deformity, no erythema and normal alignment. Tenderness found. No patellar tendon tenderness noted.      Left knee: She exhibits decreased range of motion and swelling. Tenderness found.      Comments: Painful ROM of left knee     Lower extremity weakness   Neurological: She is alert.   Skin: Skin is warm and dry. No rash noted.   Psychiatric: Her behavior is normal.   Nursing note and vitals reviewed.      RECORDS REVIEW:   I have reviewed and interpreted the most recent labs    ASSESSMENT     Diagnoses and all orders for this visit:    1. Primary osteoarthritis of both knees    2. Chronic pain of both knees    3. Impaired mobility and ADLs        PLAN      Patient saw Ortho on 7/7 for further evaluation of her chronic bilateral knee pain. There are plans for hardware from her left knee in 6 weeks then 6 weeks later a left TKA. 6 weeks after that she will have right TKA. Ortho also recommended weight loss and patient to continue to ambulate and be as mobile as possible until the surgery. She has had multiple knee injections with only temporary relief. She is very excited to have surgery to improve her pain and mobility. No other complaints today. Tylenol, Lyrica and Diclofenac gel helping with pain.     Patient was encouraged to keep me informed of any acute changes, lack of improvement, or any  new concerning symptoms.    Continue supportive care as needed for mobilization and any assistance with ADLs.         [x]  Discussed Patient in detail with nursing/staff, addressed all needs today.     [x]  Plan of Care Reviewed   []  PT/OT Reviewed   [x]  Order Changes  []  Discharge Plans Reviewed  [x]  Advance Directive on file with Nursing Home.   [x]  POA on file with Nursing Home.   []  Code Status listed: [x]  Full Code   []  DNR       “I confirm accuracy of unchanged data/findings which have been carried forward from previous visit, as well as I have updated appropriately those that have changed.”                  Marlene Garcia, APRN.

## 2021-07-17 RX ORDER — CLONAZEPAM 0.5 MG/1
TABLET ORAL
Qty: 90 TABLET | Refills: 3 | Status: CANCELLED | OUTPATIENT
Start: 2021-07-17

## 2021-07-21 ENCOUNTER — NURSING HOME (OUTPATIENT)
Dept: FAMILY MEDICINE CLINIC | Facility: CLINIC | Age: 66
End: 2021-07-21

## 2021-07-21 VITALS
SYSTOLIC BLOOD PRESSURE: 104 MMHG | RESPIRATION RATE: 20 BRPM | OXYGEN SATURATION: 95 % | WEIGHT: 211 LBS | TEMPERATURE: 98.1 F | HEART RATE: 71 BPM | DIASTOLIC BLOOD PRESSURE: 63 MMHG | BODY MASS INDEX: 38.59 KG/M2

## 2021-07-21 DIAGNOSIS — Z74.09 IMPAIRED MOBILITY AND ADLS: Primary | Chronic | ICD-10-CM

## 2021-07-21 DIAGNOSIS — E55.9 VITAMIN D DEFICIENCY: ICD-10-CM

## 2021-07-21 DIAGNOSIS — Z78.9 IMPAIRED MOBILITY AND ADLS: Primary | Chronic | ICD-10-CM

## 2021-07-21 DIAGNOSIS — M25.562 ARTHRALGIA OF BOTH KNEES: ICD-10-CM

## 2021-07-21 DIAGNOSIS — M81.0 AGE-RELATED OSTEOPOROSIS WITHOUT CURRENT PATHOLOGICAL FRACTURE: ICD-10-CM

## 2021-07-21 DIAGNOSIS — G40.309 NONINTRACTABLE GENERALIZED IDIOPATHIC EPILEPSY WITHOUT STATUS EPILEPTICUS (HCC): Chronic | ICD-10-CM

## 2021-07-21 DIAGNOSIS — M17.0 PRIMARY OSTEOARTHRITIS OF BOTH KNEES: ICD-10-CM

## 2021-07-21 DIAGNOSIS — M25.561 ARTHRALGIA OF BOTH KNEES: ICD-10-CM

## 2021-07-21 PROCEDURE — 99309 SBSQ NF CARE MODERATE MDM 30: CPT | Performed by: FAMILY MEDICINE

## 2021-07-21 NOTE — PROGRESS NOTES
Nursing Home Progress Note        Luigi Urena DO [x]  LATONIA Tony []  [address] [place] [address]  Phone: [phone]  Fax: [phone] Marcell Christina MD []  Rm Tran DO []  [address] [place] [address]  Phone: [phone]  Fax: [phone]     PATIENT NAME: Viviane Peñaloza                                                                          YOB: 1955           DATE OF SERVICE: 7/21/2021  FACILITY: []  [facility]  [] [facility]  [x]  [facility]  [] [facility]  []  Other ______________________________________________________________________     CHIEF COMPLAINT:  Chronic medical management and long-term care/seizure activity      HISTORY OF PRESENT ILLNESS:   [x]  Follow Up visit for coordination of long term care issues and chronic medical management of Diagnoses and all orders for this visit:    1. Impaired mobility and ADLs (Primary)    2. Nonintractable generalized idiopathic epilepsy without status epilepticus (CMS/HCC)    3. Primary osteoarthritis of both knees    4. Vitamin D deficiency    5. Age-related osteoporosis without current pathological fracture    6. Arthralgia of both knees    Patient remains receptive/responsive to supportive care for mobilization/transfer assistance, as well as any ADLs of need.  Nutritionally she has done well, well-hydrated.    She has continued to have significant arthralgias to bilateral knees.  She was seen by orthopedic surgery, has several surgical procedures planned in the following months.  She denies any new falls or injuries.    No reports of any seizure-like activity since last visit.    Vital signs have been stable.      PAST MEDICAL & SURGICAL HISTORY:   Past Medical History:   Diagnosis Date   • Epilepsy (CMS/HCC)    • Fracture of unspecified phalanx of left index finger, initial encounter for closed fracture 1/16/2019   • Fracture, clavicle     left    • Shoulder fracture, left       Past Surgical  History:   Procedure Laterality Date   • OOPHORECTOMY           MEDICATIONS:  I have reviewed and reconciled the patients medication list in the patients chart at the [facility] nursing facility today.      ALLERGIES:    No Known Allergies      SOCIAL HISTORY:    Social History     Socioeconomic History   • Marital status: Single     Spouse name: Not on file   • Number of children: Not on file   • Years of education: Not on file   • Highest education level: Not on file   Tobacco Use   • Smoking status: Never Smoker   • Smokeless tobacco: Never Used   Substance and Sexual Activity   • Alcohol use: No   • Drug use: No   • Sexual activity: Defer       FAMILY HISTORY:    Family History   Problem Relation Age of Onset   • Diabetes Mother    • Heart disease Mother    • Hypertension Mother    • Hypertension Father    • Diabetes Sister    • Hypertension Sister        REVIEW OF SYSTEMS:    Review of Systems  · Appetite: Fair []   Good [x]   Poor []   Weight Loss []  [x]  Weight Stable   Unavoidable Weight Loss []  Tolerating Tube Feeding []    Supplements Provided []   · Constitutional: Negative for fever, chills, diaphoresis or fatigue and weight change.   · HENT: No dysphagia; no changes to vision/hearing/smell/taste; no epistaxis  · Eyes: Negative for redness and visual disturbance.   · Respiratory: Negative for shortness of breath, chest pain, cough or chest tightness.   · Cardiovascular: Negative for chest pain and palpitations.   · Gastrointestinal: Negative for abdominal distention, abdominal pain and blood in stool.   · Endocrine: Negative for cold intolerance and heat intolerance.   · Genitourinary: Negative for difficulty urinating, dysuria and frequency.Negative for hematuria   · Musculoskeletal: Chronic myalgias and arthralgias, worse to the knees.  · Integumentary: No open wounds, rash or concerning skin lesions  · Neurological: Negative for syncope, weakness and headaches.   · Hematological: Negative for  adenopathy. Does not bruise/bleed easily.   · Immunological: Negative for reported allergies or immunological disorders  · Psychological: No acute behavioral changes    PHYSICAL EXAMINATION:   VITAL SIGNS:   Vitals:    07/21/21 1139   BP: 104/63   Pulse: 71   Resp: 20   Temp: 98.1 °F (36.7 °C)   SpO2: 95%       Physical Exam      General Appearance:  [x]  Alert   [x]  Oriented x person  [x]  No acute distress     []  Confused  []  Disoriented   []  Comatose   Head:  Atraumatic and normocephalic, without obvious abnormality.   Eyes:         PERRLA, conjunctivae and sclerae normal, no Icterus. No pallor. Extra-occular movements are within normal limits.   Ears:  Ears appear intact with no abnormalities noted.   Throat: No oral lesions, no thrush, oral mucosa moist.   Neck: Supple, trachea midline, no thyromegaly, no carotid bruit.   Back:   No kyphoscoliosis. No tenderness to palpation.   Lungs:   Chest shape is normal. Breath sounds heard bilaterally equally.  No wheezing.    Audible air exchange noted all lung fields.   Heart:  Normal S1 and S2, no murmur, no gallop, no rub. No JVD.   Abdomen:   Normal bowel sounds, no masses, no organomegaly. Soft, non-tender, non-distended, no guarding.  Mild truncal obesity.   Extremities: Moves all extremities; without edema, cyanosis or clubbing.    Poor core strength and stability.   Requires assistance with transfers at times.  Utilizes walker for assistance with ambulation.  Mild medial/lateral joint line tenderness of the left knee, mild effusion noted to the medial and lateral gutters.  Quadriceps mechanism intact.  Johnathan/Nguyen test negative.  Symmetric and appropriate dorsiflexion and plantar flexion of bilateral feet.   Pulses: Pulses palpable and equal bilaterally.   Skin: No bleeding or rash.  Generalized dry skin noted.  Age-related atrophy of skin.   Neurologic: [x] Normal speech []  Normal mental status    [x] Cranial nerves II through XII intact   [x]  No  anosmia [x]  DTR 2+ [x]  Proprioception intact  [x]  No focal motor/sensory deficits      Psych/Mood:                    [x]  No acute changes []  Depressed  Urinary:                            [x]  Continent  []  Incontinent []  Retention  []  F/C      []  UTI w/treatment in progress           ASSESSMENT     Diagnoses and all orders for this visit:    1. Impaired mobility and ADLs (Primary)    2. Nonintractable generalized idiopathic epilepsy without status epilepticus (CMS/HCC)    3. Primary osteoarthritis of both knees    4. Vitamin D deficiency    5. Age-related osteoporosis without current pathological fracture    6. Arthralgia of both knees          PLAN  Plan continuation of supportive care for any mobilization/transfer assistance of need, as well as ADLs.  Continue to follow her overall nutritional and hydration status.  No nutritional deficits reported since last visit, she remains well-hydrated.    She does have significant limitations to her ADL and overall mobilization secondary to her advanced end-stage degenerative osteoarthritic changes of bilateral knees.  For surgical procedure is a left tibial pinning removal, subsequently followed by a planned right total knee arthroplasty, and afterwards a left total knee arthroplasty.  She will need preoperative risk stratification in the few weeks preceding her first surgical procedure.    Vital signs demonstrate hemodynamic stability, blood pressure is at goal.    Continue current antiepileptic regimen.    Pain appears clinically well controlled.    Surveillance labs when needed.        [x]  Discussed Patient in detail with nursing/staff, addressed all needs today.     [x]  Plan of Care Reviewed   []  PT/OT Reviewed   []  Order Changes  []  Discharge Plans Reviewed   []  Code Status Changes    I spent 30 minutes caring for Viviane on this date of service. This time includes time spent by me in the following activities:preparing for the visit, performing a  medically appropriate examination and/or evaluation , counseling and educating the patient/family/caregiver, ordering medications, tests, or procedures, documenting information in the medical record and care coordination    I have reviewed and updated all copied forward information, as appropriate.  I attest to the accuracy and relevance of any unchanged information.       Luigi Urena DO  7/21/2021

## 2021-07-22 PROBLEM — M25.561 ARTHRALGIA OF BOTH KNEES: Status: ACTIVE | Noted: 2019-05-28

## 2021-08-06 DIAGNOSIS — G62.9 NEUROPATHY: ICD-10-CM

## 2021-08-06 RX ORDER — PREGABALIN 100 MG/1
100 CAPSULE ORAL 2 TIMES DAILY
Qty: 60 CAPSULE | Refills: 5 | Status: SHIPPED | OUTPATIENT
Start: 2021-08-06 | End: 2021-09-27 | Stop reason: SDUPTHER

## 2021-08-06 NOTE — TELEPHONE ENCOUNTER
I SPOKE WITH ANUP AT [facility].    PATIENT IS COMPLETELY OUT OF MEDICATION. PLEASE SEND ASAP.

## 2021-08-10 RX ORDER — PREGABALIN 100 MG/1
100 CAPSULE ORAL 2 TIMES DAILY
Qty: 60 CAPSULE | Refills: 0 | Status: SHIPPED | OUTPATIENT
Start: 2021-08-10 | End: 2022-01-07 | Stop reason: SDUPTHER

## 2021-08-18 ENCOUNTER — DOCUMENTATION (OUTPATIENT)
Dept: FAMILY MEDICINE CLINIC | Facility: CLINIC | Age: 66
End: 2021-08-18
Payer: MEDICARE

## 2021-08-19 VITALS
BODY MASS INDEX: 39.51 KG/M2 | OXYGEN SATURATION: 96 % | WEIGHT: 216 LBS | SYSTOLIC BLOOD PRESSURE: 112 MMHG | DIASTOLIC BLOOD PRESSURE: 60 MMHG | TEMPERATURE: 97.1 F | RESPIRATION RATE: 20 BRPM | HEART RATE: 59 BPM

## 2021-08-25 ENCOUNTER — NURSING HOME (OUTPATIENT)
Dept: FAMILY MEDICINE CLINIC | Facility: CLINIC | Age: 66
End: 2021-08-25

## 2021-08-25 VITALS
RESPIRATION RATE: 20 BRPM | DIASTOLIC BLOOD PRESSURE: 60 MMHG | TEMPERATURE: 97.4 F | BODY MASS INDEX: 39.51 KG/M2 | OXYGEN SATURATION: 91 % | SYSTOLIC BLOOD PRESSURE: 102 MMHG | HEART RATE: 54 BPM | WEIGHT: 216 LBS

## 2021-08-25 DIAGNOSIS — Z78.9 IMPAIRED MOBILITY AND ADLS: Chronic | ICD-10-CM

## 2021-08-25 DIAGNOSIS — Z98.890 STATUS POST HARDWARE REMOVAL: ICD-10-CM

## 2021-08-25 DIAGNOSIS — Z74.09 IMPAIRED MOBILITY AND ADLS: Chronic | ICD-10-CM

## 2021-08-25 PROCEDURE — 99308 SBSQ NF CARE LOW MDM 20: CPT | Performed by: NURSE PRACTITIONER

## 2021-08-26 DIAGNOSIS — G89.29 CHRONIC PAIN OF LEFT KNEE: Primary | ICD-10-CM

## 2021-08-26 DIAGNOSIS — M25.562 CHRONIC PAIN OF LEFT KNEE: Primary | ICD-10-CM

## 2021-08-26 RX ORDER — HYDROCODONE BITARTRATE AND ACETAMINOPHEN 5; 325 MG/1; MG/1
1 TABLET ORAL EVERY 4 HOURS PRN
Qty: 120 TABLET | Refills: 0 | Status: SHIPPED | OUTPATIENT
Start: 2021-08-26 | End: 2021-11-22 | Stop reason: SDUPTHER

## 2021-08-27 NOTE — PROGRESS NOTES
Nursing Home Follow Up Note      Luigi Urena DO []   LATONIA Tony [x]  [address] [place] [address]  Phone: [phone]  Fax: [phone] Marcell Christina MD []    Rm Tran DO []   [address] [place] [address]  Phone: [phone]  Fax: [phone]     PATIENT NAME: Viviane Peñaloza                                                                          YOB: 1955           DATE OF SERVICE: 8/25/2021  FACILITY:  [][facility]   [] [facility]   [x] [facility]   [] [facility]   [] Other ______________________________________________________________________      CHIEF COMPLAINT:    Follow up Ortho appointment for removal of hardware      HISTORY OF PRESENT ILLNESS:     Patient saw Ortho today for surgical procedure to remove hardware from left proximal tibia.  Dressing is intact over incision and orders given not to remove until her follow-up appointment in 2 weeks.  Ace wrap to be applied to control swelling if needed.  She will work with PT/OT.  She will be toe-touch weightbearing only as orders were given by Ortho not to apply more than 25 pounds of pressure to the left lower extremity.  She has no complaints at this time resting in bed.    PAST MEDICAL & SURGICAL HISTORY:   Past Medical History:   Diagnosis Date   • Epilepsy (CMS/HCC)    • Fracture of unspecified phalanx of left index finger, initial encounter for closed fracture 1/16/2019   • Fracture, clavicle     left    • Shoulder fracture, left       Past Surgical History:   Procedure Laterality Date   • OOPHORECTOMY           MEDICATIONS:  I have reviewed and reconciled the patients medication list in the patients chart at the skilled nursing facility today.      ALLERGIES:  No Known Allergies      SOCIAL HISTORY:  Social History     Socioeconomic History   • Marital status: Single     Spouse name: Not on file   • Number of children: Not on file   • Years of education: Not on file   • Highest education level:  Not on file   Tobacco Use   • Smoking status: Never Smoker   • Smokeless tobacco: Never Used   Substance and Sexual Activity   • Alcohol use: No   • Drug use: No   • Sexual activity: Defer       FAMILY HISTORY:  Family History   Problem Relation Age of Onset   • Diabetes Mother    • Heart disease Mother    • Hypertension Mother    • Hypertension Father    • Diabetes Sister    • Hypertension Sister        REVIEW OF SYSTEMS:  Review of Systems   Constitutional: Negative for activity change, appetite change, chills, diaphoresis, fatigue, fever, unexpected weight gain and unexpected weight loss.   HENT: Negative for congestion, ear pain, mouth sores, nosebleeds, postnasal drip, rhinorrhea, sinus pressure, sneezing, sore throat, swollen glands and trouble swallowing.    Eyes: Negative for blurred vision, pain, discharge, redness, itching and visual disturbance.   Respiratory: Negative for apnea, cough, choking, chest tightness, shortness of breath, wheezing and stridor.    Cardiovascular: Negative for chest pain, palpitations and leg swelling.   Gastrointestinal: Negative for abdominal distention, abdominal pain, blood in stool, constipation, diarrhea, nausea, vomiting, GERD and indigestion.   Endocrine: Negative for polydipsia and polyuria.   Genitourinary: Negative for decreased urine volume, difficulty urinating, dysuria, flank pain, frequency, hematuria, urgency and urinary incontinence.   Musculoskeletal: Positive for arthralgias, gait problem and myalgias. Negative for back pain and joint swelling.        Left knee pain and weakness   Skin: Negative for color change, dry skin, rash and skin lesions.   Allergic/Immunologic: Negative for environmental allergies.   Neurological: Positive for seizures and weakness. Negative for dizziness, speech difficulty, memory problem and confusion.   Psychiatric/Behavioral: Negative for behavioral problems, dysphoric mood, hallucinations, sleep disturbance and depressed mood. The  patient is not nervous/anxious.          PHYSICAL EXAMINATION:   VITAL SIGNS:     Vitals:    08/25/21 1539   BP: 102/60   Pulse: 54   Resp: 20   Temp: 97.4 °F (36.3 °C)   SpO2: 91%   Weight: 98 kg (216 lb)       Physical Exam   Constitutional: She appears well-developed and well-nourished.   HENT:   Head: Normocephalic.   Right Ear: External ear normal.   Left Ear: External ear normal.   Nose: Nose normal.   Eyes: Conjunctivae are normal.   Cardiovascular: Normal rate.   Pulmonary/Chest: Effort normal. No respiratory distress.   Abdominal: She exhibits no distension.   Musculoskeletal: No deformity.      Right knee: She exhibits decreased range of motion and swelling. She exhibits no effusion, no ecchymosis, no deformity, no erythema and normal alignment. Tenderness found. No patellar tendon tenderness noted.      Left knee: She exhibits decreased range of motion and swelling. Tenderness found.        Legs:       Comments: Painful ROM of left knee     Lower extremity weakness   Neurological: She is alert.   Skin: Skin is warm and dry. No rash noted.   Psychiatric: Her behavior is normal.   Nursing note and vitals reviewed.      RECORDS REVIEW:   I have reviewed and interpreted the most recent labs    ASSESSMENT     Diagnoses and all orders for this visit:    1. Status post hardware removal    2. Impaired mobility and ADLs        PLAN      Patient saw Ortho and had hardware removed from left proximal tibia.  Dressing is to stay intact until her follow-up appointment in 2 weeks.  She is touch toe weightbearing only.  We will continue to work with PT.  Nursing to arrange transportation for appointment in 2 weeks.  She is on aspirin 325 mg daily for prevention of DVTs.  Will follow-up and continue to monitor.    Patient was encouraged to keep me informed of any acute changes, lack of improvement, or any new concerning symptoms.    Continue supportive care as needed for mobilization and any assistance with ADLs.          [x]  Discussed Patient in detail with nursing/staff, addressed all needs today.     [x]  Plan of Care Reviewed   []  PT/OT Reviewed   [x]  Order Changes  []  Discharge Plans Reviewed  [x]  Advance Directive on file with Nursing Home.   [x]  POA on file with Nursing Home.   []  Code Status listed: [x]  Full Code   []  DNR       “I confirm accuracy of unchanged data/findings which have been carried forward from previous visit, as well as I have updated appropriately those that have changed.”                Marlene Garcia, APRN.

## 2021-09-15 ENCOUNTER — DOCUMENTATION (OUTPATIENT)
Dept: FAMILY MEDICINE CLINIC | Facility: CLINIC | Age: 66
End: 2021-09-15
Payer: MEDICARE

## 2021-09-15 VITALS
DIASTOLIC BLOOD PRESSURE: 68 MMHG | BODY MASS INDEX: 39.51 KG/M2 | SYSTOLIC BLOOD PRESSURE: 116 MMHG | WEIGHT: 216 LBS | OXYGEN SATURATION: 95 % | TEMPERATURE: 97.5 F | HEART RATE: 76 BPM | RESPIRATION RATE: 20 BRPM

## 2021-09-23 ENCOUNTER — NURSING HOME (OUTPATIENT)
Dept: FAMILY MEDICINE CLINIC | Facility: CLINIC | Age: 66
End: 2021-09-23

## 2021-09-23 VITALS
WEIGHT: 216 LBS | OXYGEN SATURATION: 98 % | SYSTOLIC BLOOD PRESSURE: 130 MMHG | RESPIRATION RATE: 20 BRPM | HEART RATE: 62 BPM | BODY MASS INDEX: 39.51 KG/M2 | TEMPERATURE: 98 F | DIASTOLIC BLOOD PRESSURE: 70 MMHG

## 2021-09-23 DIAGNOSIS — Z51.81 ENCOUNTER FOR MONITORING NSAID THERAPY: ICD-10-CM

## 2021-09-23 DIAGNOSIS — Z79.1 ENCOUNTER FOR MONITORING NSAID THERAPY: ICD-10-CM

## 2021-09-23 DIAGNOSIS — Z79.899 ENCOUNTER FOR MEDICATION REVIEW: Primary | ICD-10-CM

## 2021-09-23 DIAGNOSIS — Z96.651 STATUS POST TOTAL RIGHT KNEE REPLACEMENT: ICD-10-CM

## 2021-09-23 DIAGNOSIS — Z29.9 DVT PROPHYLAXIS: ICD-10-CM

## 2021-09-23 DIAGNOSIS — Z79.82 CURRENT USE OF ASPIRIN: ICD-10-CM

## 2021-09-23 PROCEDURE — 99308 SBSQ NF CARE LOW MDM 20: CPT | Performed by: NURSE PRACTITIONER

## 2021-09-24 ENCOUNTER — NURSING HOME (OUTPATIENT)
Dept: FAMILY MEDICINE CLINIC | Facility: CLINIC | Age: 66
End: 2021-09-24

## 2021-09-24 VITALS
WEIGHT: 216 LBS | BODY MASS INDEX: 39.51 KG/M2 | OXYGEN SATURATION: 98 % | DIASTOLIC BLOOD PRESSURE: 70 MMHG | HEART RATE: 62 BPM | TEMPERATURE: 98 F | SYSTOLIC BLOOD PRESSURE: 130 MMHG | RESPIRATION RATE: 20 BRPM

## 2021-09-24 DIAGNOSIS — Z78.9 IMPAIRED MOBILITY AND ADLS: Chronic | ICD-10-CM

## 2021-09-24 DIAGNOSIS — Z96.651 TOTAL KNEE REPLACEMENT STATUS, RIGHT: Primary | ICD-10-CM

## 2021-09-24 DIAGNOSIS — Z74.09 IMPAIRED MOBILITY AND ADLS: Chronic | ICD-10-CM

## 2021-09-24 DIAGNOSIS — M17.0 PRIMARY OSTEOARTHRITIS OF BOTH KNEES: ICD-10-CM

## 2021-09-24 PROCEDURE — 99309 SBSQ NF CARE MODERATE MDM 30: CPT | Performed by: NURSE PRACTITIONER

## 2021-09-25 NOTE — PROGRESS NOTES
Nursing Home Follow Up Note      Luigi Urena DO []   LATONIA Tony [x]  [address] [place] [address]  Phone: [phone]  Fax: [phone] Marcell Christina MD []    Rm Tran DO []   [address] [place] [address]  Phone: [phone]  Fax: [phone]     PATIENT NAME: Viviane Peñaloza                                                                          YOB: 1955           DATE OF SERVICE: 9/24/2021  FACILITY:  [][facility]   [] [facility]   [x] [facility]   [] [facility]   [] Other ______________________________________________________________________      CHIEF COMPLAINT:    Follow up visit s/p right TKR        HISTORY OF PRESENT ILLNESS:     Right TKR performed on 9/22 for DJD. She tolerated procedure very well and stayed over night for observation. She readmitted to facility yesterday. She is taking pain medication as directed and it is helping pain. She did not use CPM machine last night because she reported to nurse that it was too painful at the ordered setting and she could not tolerate it. Nursing has call to Ortho for further instructions. Dressing is intact today with no signs symptoms drainage or bleeding. No s/s infection to site. She will be working with PT for rehabilitation and strengthening.     PAST MEDICAL & SURGICAL HISTORY:   Past Medical History:   Diagnosis Date   • Epilepsy (CMS/HCC)    • Fracture of unspecified phalanx of left index finger, initial encounter for closed fracture 1/16/2019   • Fracture, clavicle     left    • Shoulder fracture, left       Past Surgical History:   Procedure Laterality Date   • OOPHORECTOMY           MEDICATIONS:  I have reviewed and reconciled the patients medication list in the patients chart at the skilled nursing facility today.      ALLERGIES:  No Known Allergies      SOCIAL HISTORY:  Social History     Socioeconomic History   • Marital status: Single     Spouse name: Not on file   • Number of children:  Not on file   • Years of education: Not on file   • Highest education level: Not on file   Tobacco Use   • Smoking status: Never Smoker   • Smokeless tobacco: Never Used   Substance and Sexual Activity   • Alcohol use: No   • Drug use: No   • Sexual activity: Defer       FAMILY HISTORY:  Family History   Problem Relation Age of Onset   • Diabetes Mother    • Heart disease Mother    • Hypertension Mother    • Hypertension Father    • Diabetes Sister    • Hypertension Sister        REVIEW OF SYSTEMS:  Review of Systems   Constitutional: Negative for activity change, appetite change, chills, diaphoresis, fatigue, fever, unexpected weight gain and unexpected weight loss.   HENT: Negative for congestion, ear pain, mouth sores, nosebleeds, postnasal drip, rhinorrhea, sinus pressure, sneezing, sore throat, swollen glands and trouble swallowing.    Eyes: Negative for blurred vision, pain, discharge, redness, itching and visual disturbance.   Respiratory: Negative for apnea, cough, choking, chest tightness, shortness of breath, wheezing and stridor.    Cardiovascular: Negative for chest pain, palpitations and leg swelling.   Gastrointestinal: Negative for abdominal distention, abdominal pain, blood in stool, constipation, diarrhea, nausea, vomiting, GERD and indigestion.   Endocrine: Negative for polydipsia and polyuria.   Genitourinary: Negative for decreased urine volume, difficulty urinating, dysuria, flank pain, frequency, hematuria, urgency and urinary incontinence.   Musculoskeletal: Positive for arthralgias, gait problem and myalgias. Negative for back pain and joint swelling.        Left knee pain and weakness   Skin: Negative for color change, dry skin, rash and skin lesions.   Allergic/Immunologic: Negative for environmental allergies.   Neurological: Positive for seizures and weakness. Negative for dizziness, speech difficulty, memory problem and confusion.   Psychiatric/Behavioral: Negative for behavioral  problems, dysphoric mood, hallucinations, sleep disturbance and depressed mood. The patient is not nervous/anxious.          PHYSICAL EXAMINATION:   VITAL SIGNS:     Vitals:    09/24/21 0859   BP: 130/70   Pulse: 62   Resp: 20   Temp: 98 °F (36.7 °C)   SpO2: 98%   Weight: 98 kg (216 lb)       Physical Exam   Constitutional: She appears well-developed and well-nourished.   HENT:   Head: Normocephalic.   Right Ear: External ear normal.   Left Ear: External ear normal.   Nose: Nose normal.   Eyes: Conjunctivae are normal.   Cardiovascular: Normal rate.   Pulmonary/Chest: Effort normal. No respiratory distress.   Abdominal: She exhibits no distension.   Musculoskeletal: No deformity.      Right knee: She exhibits decreased range of motion and swelling. She exhibits no effusion, no ecchymosis, no deformity, no erythema and normal alignment. Tenderness found. No patellar tendon tenderness noted.      Left knee: She exhibits decreased range of motion.        Legs:       Comments: Painful ROM of left knee     Lower extremity weakness   Neurological: She is alert.   Skin: Skin is warm and dry. No rash noted.   Psychiatric: Her behavior is normal.   Nursing note and vitals reviewed.      RECORDS REVIEW:   I have reviewed and interpreted the most recent labs    ASSESSMENT     Diagnoses and all orders for this visit:    1. Total knee replacement status, right (Primary)    2. Primary osteoarthritis of both knees    3. Impaired mobility and ADLs        PLAN      Patient had right TKR on 9/22, tolerated well. Orders given for her to take ASA 81 mg daily x 45 days for DVT prevention. She will take Meloxicam 15 mg x 14 days. Vitamin C and folic acid started as well.  Orders for ice to be applied after CPM machine. Nursing is awaiting return call from [facility] regarding CPM settings, as patient reports she can not tolerate current settings. Will follow-up and continue to monitor.    Patient was encouraged to keep me informed of any  acute changes, lack of improvement, or any new concerning symptoms.    Nursing and PT to continue supportive care as needed for mobilization and any assistance with ADLs.         [x]  Discussed Patient in detail with nursing/staff, addressed all needs today.     [x]  Plan of Care Reviewed   []  PT/OT Reviewed   [x]  Order Changes  []  Discharge Plans Reviewed  [x]  Advance Directive on file with Nursing Home.   [x]  POA on file with Nursing Home.   []  Code Status listed: [x]  Full Code   []  DNR       “I confirm accuracy of unchanged data/findings which have been carried forward from previous visit, as well as I have updated appropriately those that have changed.”                Marlene Garcia, APRN.

## 2021-09-26 NOTE — PROGRESS NOTES
Nursing Home Follow Up Note      Luigi Urena DO []   LATONIA Tony [x]  [address] [place] [address]  Phone: [phone]  Fax: [phone] Marcell Christina MD []    Rm Tran DO []   [address] [place] [address]  Phone: [phone]  Fax: [phone]     PATIENT NAME: Viviane Peñaloza                                                                          YOB: 1955           DATE OF SERVICE: 9/23/2021  FACILITY:  [][place]   [] [place]   [x] [facility]   [] [facility]   [] Other ______________________________________________________________________      CHIEF COMPLAINT:    Medication review      HISTORY OF PRESENT ILLNESS:     Patient with right total knee replacement yesterday and return to facility earlier today with medication changes.  Nursing would like medication reviewed to ensure changes are approved.  Aspirin, meloxicam, folic acid and vitamin C have been added to her medications.  Nursing was to ensure that these are not contraindicated with her other medications.    PAST MEDICAL & SURGICAL HISTORY:   Past Medical History:   Diagnosis Date   • Epilepsy (CMS/[facility])    • Fracture of unspecified phalanx of left index finger, initial encounter for closed fracture 1/16/2019   • Fracture, clavicle     left    • Shoulder fracture, left       Past Surgical History:   Procedure Laterality Date   • OOPHORECTOMY           MEDICATIONS:  I have reviewed and reconciled the patients medication list in the patients chart at the skilled nursing facility today.      ALLERGIES:  No Known Allergies      SOCIAL HISTORY:  Social History     Socioeconomic History   • Marital status: Single     Spouse name: Not on file   • Number of children: Not on file   • Years of education: Not on file   • Highest education level: Not on file   Tobacco Use   • Smoking status: Never Smoker   • Smokeless tobacco: Never Used   Substance and Sexual Activity   • Alcohol use: No   • Drug use: No    • Sexual activity: Defer       FAMILY HISTORY:  Family History   Problem Relation Age of Onset   • Diabetes Mother    • Heart disease Mother    • Hypertension Mother    • Hypertension Father    • Diabetes Sister    • Hypertension Sister        REVIEW OF SYSTEMS:  Review of Systems   Constitutional: Negative for activity change, appetite change, chills, diaphoresis, fatigue, fever, unexpected weight gain and unexpected weight loss.   HENT: Negative for congestion, ear pain, mouth sores, nosebleeds, postnasal drip, rhinorrhea, sinus pressure, sneezing, sore throat, swollen glands and trouble swallowing.    Eyes: Negative for blurred vision, pain, discharge, redness, itching and visual disturbance.   Respiratory: Negative for apnea, cough, choking, chest tightness, shortness of breath, wheezing and stridor.    Cardiovascular: Negative for chest pain, palpitations and leg swelling.   Gastrointestinal: Negative for abdominal distention, abdominal pain, blood in stool, constipation, diarrhea, nausea, vomiting, GERD and indigestion.   Endocrine: Negative for polydipsia and polyuria.   Genitourinary: Negative for decreased urine volume, difficulty urinating, dysuria, flank pain, frequency, hematuria, urgency and urinary incontinence.   Musculoskeletal: Positive for arthralgias, gait problem and myalgias. Negative for back pain and joint swelling.        Right knee pain and decreased range of motion status post TKR   Skin: Negative for color change, dry skin, rash and skin lesions.   Allergic/Immunologic: Negative for environmental allergies.   Neurological: Positive for seizures and weakness. Negative for dizziness, speech difficulty, memory problem and confusion.   Psychiatric/Behavioral: Negative for behavioral problems, dysphoric mood, hallucinations, sleep disturbance and depressed mood. The patient is not nervous/anxious.          PHYSICAL EXAMINATION:   VITAL SIGNS:     Vitals:    09/23/21 1600   BP: 130/70   Pulse:  62   Resp: 20   Temp: 98 °F (36.7 °C)   SpO2: 98%   Weight: 98 kg (216 lb)       Physical Exam   Constitutional: She appears well-developed and well-nourished.   HENT:   Head: Normocephalic.   Right Ear: External ear normal.   Left Ear: External ear normal.   Nose: Nose normal.   Eyes: Conjunctivae are normal.   Cardiovascular: Normal rate.   Pulmonary/Chest: Effort normal. No respiratory distress.   Abdominal: She exhibits no distension.   Musculoskeletal: No deformity.      Right knee: She exhibits decreased range of motion and swelling. Tenderness found.      Left knee: She exhibits decreased range of motion.      Comments: Dressing intact right knee   Neurological: She is alert.   Skin: Skin is warm and dry. No rash noted.   Psychiatric: Her behavior is normal.   Nursing note and vitals reviewed.      RECORDS REVIEW:   I have reviewed and interpreted the most recent labs    ASSESSMENT     Diagnoses and all orders for this visit:    1. Encounter for medication review (Primary)    2. Current use of aspirin    3. DVT prophylaxis    4. Encounter for monitoring NSAID therapy    5. Status post total right knee replacement        PLAN      Medication review/use of aspirin/DVT prophylaxis/NSAID therapy/status post TKR  -It is okay for patient to continue aspirin for DVT prophylaxis status post her total knee replacement.  Okay also for NSAID therapy as ordered by Ortho.  She was started on vitamin C and folic acid to promote healing.  She is working with therapy during visit today so will better assess knee tomorrow.  Will follow-up and continue to monitor.    Patient was encouraged to keep me informed of any acute changes, lack of improvement, or any new concerning symptoms.    Continue supportive care as needed for mobilization and any assistance with ADLs.         [x]  Discussed Patient in detail with nursing/staff, addressed all needs today.     [x]  Plan of Care Reviewed   []  PT/OT Reviewed   [x]  Order  Changes  []  Discharge Plans Reviewed  [x]  Advance Directive on file with Nursing Home.   [x]  POA on file with Nursing Home.   []  Code Status listed: [x]  Full Code   []  DNR       “I confirm accuracy of unchanged data/findings which have been carried forward from previous visit, as well as I have updated appropriately those that have changed.”                  Marlene Garcia, APRN.

## 2021-09-27 DIAGNOSIS — G62.9 NEUROPATHY: ICD-10-CM

## 2021-09-27 RX ORDER — PREGABALIN 100 MG/1
100 CAPSULE ORAL 2 TIMES DAILY
Qty: 60 CAPSULE | Refills: 5 | Status: SHIPPED | OUTPATIENT
Start: 2021-09-27 | End: 2021-10-13 | Stop reason: SDUPTHER

## 2021-09-27 RX ORDER — CLONAZEPAM 0.5 MG/1
TABLET ORAL
Qty: 90 TABLET | Refills: 3 | Status: SHIPPED | OUTPATIENT
Start: 2021-09-27 | End: 2021-11-22 | Stop reason: SDUPTHER

## 2021-09-27 NOTE — TELEPHONE ENCOUNTER
[facility] MEDICATION REQUEST FOR LYRICA 100 MG AND CLONAZEPAM 0.5 MG.    LYRICA DIRECTIONS: TAKE 1 TAB PO BID.    CLONAZEPAM DIRECTIONS: TAKE 1 TAB PO EVERY MORNING AND TAKE 2 TABS PO AT BEDTIME.

## 2021-10-13 DIAGNOSIS — G62.9 NEUROPATHY: ICD-10-CM

## 2021-10-13 NOTE — TELEPHONE ENCOUNTER
REQUESTED BY [facility]     NEW SCRIPT NEEDED    LYRICA 100 MG ONT CAPSULE PO BID SCHEDULED

## 2021-10-15 RX ORDER — PREGABALIN 100 MG/1
100 CAPSULE ORAL 2 TIMES DAILY
Qty: 60 CAPSULE | Refills: 5 | Status: SHIPPED | OUTPATIENT
Start: 2021-10-15 | End: 2021-11-22 | Stop reason: SDUPTHER

## 2021-10-20 ENCOUNTER — DOCUMENTATION (OUTPATIENT)
Dept: FAMILY MEDICINE CLINIC | Facility: CLINIC | Age: 66
End: 2021-10-20
Payer: MEDICARE

## 2021-10-20 VITALS
HEART RATE: 67 BPM | SYSTOLIC BLOOD PRESSURE: 118 MMHG | TEMPERATURE: 98.2 F | WEIGHT: 214 LBS | OXYGEN SATURATION: 96 % | RESPIRATION RATE: 18 BRPM | DIASTOLIC BLOOD PRESSURE: 70 MMHG | BODY MASS INDEX: 39.14 KG/M2

## 2021-10-25 DIAGNOSIS — G62.9 NEUROPATHY: ICD-10-CM

## 2021-10-25 DIAGNOSIS — Z96.651 TOTAL KNEE REPLACEMENT STATUS, RIGHT: Primary | ICD-10-CM

## 2021-10-26 RX ORDER — GABAPENTIN 300 MG/1
300 CAPSULE ORAL NIGHTLY
Qty: 30 CAPSULE | Refills: 5 | Status: SHIPPED | OUTPATIENT
Start: 2021-10-26 | End: 2022-04-03

## 2021-10-29 ENCOUNTER — NURSING HOME (OUTPATIENT)
Dept: FAMILY MEDICINE CLINIC | Facility: CLINIC | Age: 66
End: 2021-10-29

## 2021-10-29 VITALS
BODY MASS INDEX: 38.78 KG/M2 | HEART RATE: 72 BPM | SYSTOLIC BLOOD PRESSURE: 113 MMHG | TEMPERATURE: 98.1 F | DIASTOLIC BLOOD PRESSURE: 70 MMHG | RESPIRATION RATE: 16 BRPM | WEIGHT: 212 LBS | OXYGEN SATURATION: 97 %

## 2021-10-29 DIAGNOSIS — Z74.09 IMPAIRED MOBILITY AND ADLS: Chronic | ICD-10-CM

## 2021-10-29 DIAGNOSIS — M25.562 PAIN AND SWELLING OF LEFT KNEE: Primary | ICD-10-CM

## 2021-10-29 DIAGNOSIS — Z78.9 IMPAIRED MOBILITY AND ADLS: Chronic | ICD-10-CM

## 2021-10-29 DIAGNOSIS — M25.462 PAIN AND SWELLING OF LEFT KNEE: Primary | ICD-10-CM

## 2021-10-29 DIAGNOSIS — M17.12 PRIMARY OSTEOARTHRITIS OF LEFT KNEE: ICD-10-CM

## 2021-10-29 PROCEDURE — 99309 SBSQ NF CARE MODERATE MDM 30: CPT | Performed by: NURSE PRACTITIONER

## 2021-10-31 NOTE — PROGRESS NOTES
Nursing Home Follow Up Note      Luigi Urena DO []   LATONIA Tony [x]  [address] [place] [address]  Phone: [phone]  Fax: [phone] Marcell Christina MD []    Rm Tran DO []   [address] [place] [address]  Phone: [phone]  Fax: [phone]     PATIENT NAME: Viviane Peñaloza                                                                          YOB: 1955           DATE OF SERVICE: 10/29/2021  FACILITY:  [][place]   [] [place]   [x] [facility]   [] [facility]   [] Other ______________________________________________________________________      CHIEF COMPLAINT:    Increased pain and swelling to superior aspect of left knee.        HISTORY OF PRESENT ILLNESS:     Patient has complaints today of increased pain and swelling to the superior aspect of her left knee for the past few days.  The pain is there some, rated at a 4, when just resting but goes to 7 or higher when she is working with PT.  The pain is better today than it has been over the past couple days but the swelling is still there.  No increased pain with palpation and she does not know of any injury.  She does have surgery scheduled for 11/10.    PAST MEDICAL & SURGICAL HISTORY:   Past Medical History:   Diagnosis Date   • Epilepsy (HCC)    • Fracture of unspecified phalanx of left index finger, initial encounter for closed fracture 1/16/2019   • Fracture, clavicle     left    • Shoulder fracture, left       Past Surgical History:   Procedure Laterality Date   • OOPHORECTOMY           MEDICATIONS:  I have reviewed and reconciled the patients medication list in the patients chart at the skilled nursing facility today.      ALLERGIES:  No Known Allergies      SOCIAL HISTORY:  Social History     Socioeconomic History   • Marital status: Single   Tobacco Use   • Smoking status: Never Smoker   • Smokeless tobacco: Never Used   Substance and Sexual Activity   • Alcohol use: No   • Drug use: No   •  Sexual activity: Defer       FAMILY HISTORY:  Family History   Problem Relation Age of Onset   • Diabetes Mother    • Heart disease Mother    • Hypertension Mother    • Hypertension Father    • Diabetes Sister    • Hypertension Sister        REVIEW OF SYSTEMS:  Review of Systems   Constitutional: Negative for activity change, appetite change, chills, diaphoresis, fatigue, fever, unexpected weight gain and unexpected weight loss.   HENT: Negative for congestion, ear pain, mouth sores, nosebleeds, postnasal drip, rhinorrhea, sinus pressure, sneezing, sore throat, swollen glands and trouble swallowing.    Eyes: Negative for blurred vision, pain, discharge, redness, itching and visual disturbance.   Respiratory: Negative for apnea, cough, choking, chest tightness, shortness of breath, wheezing and stridor.    Cardiovascular: Negative for chest pain, palpitations and leg swelling.   Gastrointestinal: Negative for abdominal distention, abdominal pain, blood in stool, constipation, diarrhea, nausea, vomiting, GERD and indigestion.   Endocrine: Negative for polydipsia and polyuria.   Genitourinary: Negative for decreased urine volume, difficulty urinating, dysuria, flank pain, frequency, hematuria, urgency and urinary incontinence.   Musculoskeletal: Positive for arthralgias, gait problem and myalgias. Negative for back pain and joint swelling.        Left superior knee pain, swelling and weakness   Skin: Negative for color change, dry skin, rash and skin lesions.   Allergic/Immunologic: Negative for environmental allergies.   Neurological: Positive for seizures and weakness. Negative for dizziness, speech difficulty, memory problem and confusion.   Psychiatric/Behavioral: Negative for behavioral problems, dysphoric mood, hallucinations, sleep disturbance and depressed mood. The patient is not nervous/anxious.          PHYSICAL EXAMINATION:   VITAL SIGNS:     Vitals:    10/29/21 1526   BP: 113/70   Pulse: 72   Resp: 16    Temp: 98.1 °F (36.7 °C)   SpO2: 97%   Weight: 96.2 kg (212 lb)       Physical Exam   Constitutional: She appears well-developed and well-nourished.   HENT:   Head: Normocephalic.   Right Ear: External ear normal.   Left Ear: External ear normal.   Nose: Nose normal.   Eyes: Conjunctivae are normal.   Cardiovascular: Normal rate.   Pulmonary/Chest: Effort normal. No respiratory distress.   Abdominal: She exhibits no distension.   Musculoskeletal: No deformity.      Right knee: She exhibits decreased range of motion and swelling. She exhibits no effusion, no ecchymosis, no deformity, no erythema and normal alignment. Tenderness found. No patellar tendon tenderness noted.      Left knee: She exhibits decreased range of motion, swelling and effusion (Superior to left knee).        Legs:       Comments: Painful ROM of left knee     Lower extremity weakness   Neurological: She is alert.   Skin: Skin is warm and dry. No rash noted.   Psychiatric: Her behavior is normal.   Nursing note and vitals reviewed.      RECORDS REVIEW:   I have reviewed and interpreted the most recent labs    ASSESSMENT     Diagnoses and all orders for this visit:    1. Pain and swelling of left knee (Primary)    2. Primary osteoarthritis of left knee    3. Impaired mobility and ADLs        PLAN       Pain and swelling left knee/OA left knee  -Will obtain x-ray of left knee and femur for further evaluation of symptoms.  Nursing to apply compression wrap to knee and superior aspect of knee, where swelling is.  She has knee replacement surgery scheduled for 11/10.  Will follow-up and continue to monitor.    Patient was encouraged to keep me informed of any acute changes, lack of improvement, or any new concerning symptoms.    Nursing and PT to continue supportive care as needed for mobilization and any assistance with ADLs.         [x]  Discussed Patient in detail with nursing/staff, addressed all needs today.     [x]  Plan of Care Reviewed   []   PT/OT Reviewed   [x]  Order Changes  []  Discharge Plans Reviewed  [x]  Advance Directive on file with Nursing Home.   [x]  POA on file with Nursing Home.   []  Code Status listed: [x]  Full Code   []  DNR       “I confirm accuracy of unchanged data/findings which have been carried forward from previous visit, as well as I have updated appropriately those that have changed.”                Marlene Garcia, APRN.

## 2021-11-02 ENCOUNTER — NURSING HOME (OUTPATIENT)
Dept: FAMILY MEDICINE CLINIC | Facility: CLINIC | Age: 66
End: 2021-11-02

## 2021-11-02 VITALS
DIASTOLIC BLOOD PRESSURE: 86 MMHG | TEMPERATURE: 97.4 F | OXYGEN SATURATION: 98 % | SYSTOLIC BLOOD PRESSURE: 181 MMHG | HEART RATE: 70 BPM | BODY MASS INDEX: 38.78 KG/M2 | WEIGHT: 212 LBS | RESPIRATION RATE: 18 BRPM

## 2021-11-02 DIAGNOSIS — G62.9 NEUROPATHY: ICD-10-CM

## 2021-11-02 DIAGNOSIS — Z78.9 IMPAIRED MOBILITY AND ADLS: Chronic | ICD-10-CM

## 2021-11-02 DIAGNOSIS — Z96.651 STATUS POST TOTAL RIGHT KNEE REPLACEMENT: ICD-10-CM

## 2021-11-02 DIAGNOSIS — M17.12 PRIMARY OSTEOARTHRITIS OF LEFT KNEE: ICD-10-CM

## 2021-11-02 DIAGNOSIS — R29.898 WEAKNESS OF BOTH LOWER EXTREMITIES: ICD-10-CM

## 2021-11-02 DIAGNOSIS — Z74.09 IMPAIRED MOBILITY AND ADLS: Chronic | ICD-10-CM

## 2021-11-02 DIAGNOSIS — R29.6 MULTIPLE FALLS: Primary | ICD-10-CM

## 2021-11-02 PROCEDURE — 99309 SBSQ NF CARE MODERATE MDM 30: CPT | Performed by: NURSE PRACTITIONER

## 2021-11-03 NOTE — PROGRESS NOTES
Nursing Home Follow Up Note      Luigi Urena DO []   LATONIA Tony [x]  [address] [place] [address]  Phone: [phone]  Fax: [phone] Marcell Christina MD []    Rm Tran DO []   [address] [place] [address]  Phone: [phone]  Fax: [phone]     PATIENT NAME: Viviane Peñaloza                                                                          YOB: 1955           DATE OF SERVICE: 11/2/2021  FACILITY:  [][place]   [] [place]   [x] [facility]   [] [facility]   [] Other ______________________________________________________________________      CHIEF COMPLAINT:    Follow up fall    BLE weakness        HISTORY OF PRESENT ILLNESS:     At approximately 0100 this morning, patient took herself to the restroom and had a fall into the floor.  She reports that something sticky was in the floor and it made her lose her balance and she fell back onto her buttocks.  She had no pain at the time but later this morning started experiencing some neuropathy in her left upper leg but felt like it was radiating from her knee up her leg.  She reports that this is not chronic, it comes and goes.    Just prior to visit today, patient was hanging up her laundry and fell again.  She reports at this time she was just standing there and her legs gave out.  She felt like she was going to do the splits so she went down on her buttocks again.  She has no pain and no known injury from this.    PAST MEDICAL & SURGICAL HISTORY:   Past Medical History:   Diagnosis Date   • Epilepsy (HCC)    • Fracture of unspecified phalanx of left index finger, initial encounter for closed fracture 1/16/2019   • Fracture, clavicle     left    • Shoulder fracture, left       Past Surgical History:   Procedure Laterality Date   • OOPHORECTOMY           MEDICATIONS:  I have reviewed and reconciled the patients medication list in the patients chart at the skilled nursing facility today.       ALLERGIES:  No Known Allergies      SOCIAL HISTORY:  Social History     Socioeconomic History   • Marital status: Single   Tobacco Use   • Smoking status: Never Smoker   • Smokeless tobacco: Never Used   Substance and Sexual Activity   • Alcohol use: No   • Drug use: No   • Sexual activity: Defer       FAMILY HISTORY:  Family History   Problem Relation Age of Onset   • Diabetes Mother    • Heart disease Mother    • Hypertension Mother    • Hypertension Father    • Diabetes Sister    • Hypertension Sister        REVIEW OF SYSTEMS:  Review of Systems   Constitutional: Negative for activity change, appetite change, chills, diaphoresis, fatigue, fever, unexpected weight gain and unexpected weight loss.   HENT: Negative for congestion, ear pain, mouth sores, nosebleeds, postnasal drip, rhinorrhea, sinus pressure, sneezing, sore throat, swollen glands and trouble swallowing.    Eyes: Negative for blurred vision, pain, discharge, redness, itching and visual disturbance.   Respiratory: Negative for apnea, cough, choking, chest tightness, shortness of breath, wheezing and stridor.    Cardiovascular: Negative for chest pain, palpitations and leg swelling.   Gastrointestinal: Negative for abdominal distention, abdominal pain, blood in stool, constipation, diarrhea, nausea, vomiting, GERD and indigestion.   Endocrine: Negative for polydipsia and polyuria.   Genitourinary: Negative for decreased urine volume, difficulty urinating, dysuria, flank pain, frequency, hematuria, urgency and urinary incontinence.   Musculoskeletal: Positive for arthralgias, gait problem and myalgias. Negative for back pain and joint swelling.   Skin: Negative for color change, dry skin, rash and skin lesions.   Allergic/Immunologic: Negative for environmental allergies.   Neurological: Positive for seizures and weakness. Negative for dizziness, speech difficulty, memory problem and confusion.        Intermittent neuropathy left upper leg    Psychiatric/Behavioral: Negative for behavioral problems, dysphoric mood, hallucinations, sleep disturbance and depressed mood. The patient is not nervous/anxious.          PHYSICAL EXAMINATION:   VITAL SIGNS:     Vitals:    11/02/21 1338   BP: (!) 181/86   Pulse: 70   Resp: 18   Temp: 97.4 °F (36.3 °C)   SpO2: 98%   Weight: 96.2 kg (212 lb)       Physical Exam   Constitutional: She appears well-developed and well-nourished.   HENT:   Head: Normocephalic.   Right Ear: External ear normal.   Left Ear: External ear normal.   Nose: Nose normal.   Eyes: Conjunctivae are normal.   Cardiovascular: Normal rate.   Pulmonary/Chest: Effort normal. No respiratory distress.   Abdominal: She exhibits no distension.   Musculoskeletal: No deformity.      Right knee: She exhibits decreased range of motion and swelling. She exhibits no effusion, no ecchymosis, no deformity, no erythema and normal alignment. Tenderness found. No patellar tendon tenderness noted.      Left knee: She exhibits decreased range of motion and swelling.        Legs:       Comments: Painful ROM of left knee     Lower extremity weakness   Neurological: She is alert.   Skin: Skin is warm and dry. No rash noted.   Psychiatric: Her behavior is normal.   Nursing note and vitals reviewed.      RECORDS REVIEW:   I have reviewed and interpreted the most recent labs    ASSESSMENT     Diagnoses and all orders for this visit:    1. Multiple falls (Primary)    2. Neuropathy    3. Status post total right knee replacement    4. Primary osteoarthritis of left knee    5. Weakness of both lower extremities    6. Impaired mobility and ADLs        PLAN       Multiple falls/neuropathy/status post right TKR/left  Knee OA/lower extremity weakness  -X-ray of left hip and femur ordered due to fall and reported neuropathy.  She was advised to call for assistance for any transportation or ADLs.  She was advised that due to her recent right knee replacement in the severe arthritis  and weakness of left knee, she has to use wheelchair or walker when out of bed because she is at very high risk for falling.  Nursing to send results of x-ray when available.  She is scheduled for left knee replacement surgery next week.  Will follow-up and continue to monitor.    Patient was encouraged to keep me informed of any acute changes, lack of improvement, or any new concerning symptoms.    Nursing and PT to continue supportive care as needed for mobilization and any assistance with ADLs.         [x]  Discussed Patient in detail with nursing/staff, addressed all needs today.     [x]  Plan of Care Reviewed   []  PT/OT Reviewed   [x]  Order Changes  []  Discharge Plans Reviewed  [x]  Advance Directive on file with Nursing Home.   [x]  POA on file with Nursing Home.   []  Code Status listed: [x]  Full Code   []  DNR       “I confirm accuracy of unchanged data/findings which have been carried forward from previous visit, as well as I have updated appropriately those that have changed.”                  Marlene Garcia, APRN.

## 2021-11-08 ENCOUNTER — NURSING HOME (OUTPATIENT)
Dept: FAMILY MEDICINE CLINIC | Facility: CLINIC | Age: 66
End: 2021-11-08

## 2021-11-08 VITALS
WEIGHT: 212 LBS | HEART RATE: 67 BPM | DIASTOLIC BLOOD PRESSURE: 72 MMHG | BODY MASS INDEX: 38.78 KG/M2 | SYSTOLIC BLOOD PRESSURE: 118 MMHG | RESPIRATION RATE: 18 BRPM | TEMPERATURE: 97.8 F | OXYGEN SATURATION: 97 %

## 2021-11-08 DIAGNOSIS — M17.12 PRIMARY OSTEOARTHRITIS OF LEFT KNEE: ICD-10-CM

## 2021-11-08 DIAGNOSIS — R29.6 MULTIPLE FALLS: Primary | ICD-10-CM

## 2021-11-08 DIAGNOSIS — Z96.651 STATUS POST RIGHT KNEE REPLACEMENT: ICD-10-CM

## 2021-11-08 DIAGNOSIS — Z78.9 IMPAIRED MOBILITY AND ADLS: Chronic | ICD-10-CM

## 2021-11-08 DIAGNOSIS — Z74.09 IMPAIRED MOBILITY AND ADLS: Chronic | ICD-10-CM

## 2021-11-08 PROCEDURE — 99309 SBSQ NF CARE MODERATE MDM 30: CPT | Performed by: NURSE PRACTITIONER

## 2021-11-10 NOTE — PROGRESS NOTES
Nursing Home Follow Up Note      Luigi Urena DO []   LATONIA Tony [x]  [address] [place] [address]  Phone: [phone]  Fax: [phone] Marcell Christina MD []    Rm Tran DO []   [address] [place] [address]  Phone: [phone]  Fax: [phone]     PATIENT NAME: Viviane Peñaloza                                                                          YOB: 1955           DATE OF SERVICE: 11/8/2021  FACILITY:  [][facility]   [] [facility]   [x] [facility]   [] [facility]   [] Other ______________________________________________________________________      CHIEF COMPLAINT:    Follow-up fall        HISTORY OF PRESENT ILLNESS:     On 11/7, nurse was called into the room to find patient lying in the floor on her left side.  She had a backup as needed under her head has a pillow.  Patient reports she had gotten up and got her bag of yarn and was bringing it back to her bed to work on a project.  The next thing she knows she is lying in the floor on her left side.  She does not remember what happened.  This is the third time she is feeling in the past week.  The first time she reported slipping in the floor, the second time her leg gave out but this time she is not sure what happened.  She has no noted or reported injuries.  She is having the surgery Thursday.      PAST MEDICAL & SURGICAL HISTORY:   Past Medical History:   Diagnosis Date   • Epilepsy (HCC)    • Fracture of unspecified phalanx of left index finger, initial encounter for closed fracture 1/16/2019   • Fracture, clavicle     left    • Shoulder fracture, left       Past Surgical History:   Procedure Laterality Date   • OOPHORECTOMY           MEDICATIONS:  I have reviewed and reconciled the patients medication list in the patients chart at the skilled nursing facility today.      ALLERGIES:  No Known Allergies      SOCIAL HISTORY:  Social History     Socioeconomic History   • Marital status: Single   Tobacco  Use   • Smoking status: Never Smoker   • Smokeless tobacco: Never Used   Substance and Sexual Activity   • Alcohol use: No   • Drug use: No   • Sexual activity: Defer       FAMILY HISTORY:  Family History   Problem Relation Age of Onset   • Diabetes Mother    • Heart disease Mother    • Hypertension Mother    • Hypertension Father    • Diabetes Sister    • Hypertension Sister        REVIEW OF SYSTEMS:  Review of Systems   Constitutional: Negative for activity change, appetite change, chills, diaphoresis, fatigue, fever, unexpected weight gain and unexpected weight loss.   HENT: Negative for congestion, ear pain, mouth sores, nosebleeds, postnasal drip, rhinorrhea, sinus pressure, sneezing, sore throat, swollen glands and trouble swallowing.    Eyes: Negative for blurred vision, pain, discharge, redness, itching and visual disturbance.   Respiratory: Negative for apnea, cough, choking, chest tightness, shortness of breath, wheezing and stridor.    Cardiovascular: Negative for chest pain, palpitations and leg swelling.   Gastrointestinal: Negative for abdominal distention, abdominal pain, blood in stool, constipation, diarrhea, nausea, vomiting, GERD and indigestion.   Endocrine: Negative for polydipsia and polyuria.   Genitourinary: Negative for decreased urine volume, difficulty urinating, dysuria, flank pain, frequency, hematuria, urgency and urinary incontinence.   Musculoskeletal: Positive for arthralgias, gait problem and myalgias. Negative for back pain and joint swelling.   Skin: Negative for color change, dry skin, rash and skin lesions.   Allergic/Immunologic: Negative for environmental allergies.   Neurological: Positive for seizures, weakness and memory problem. Negative for dizziness, speech difficulty and confusion.        Intermittent neuropathy left upper leg   Psychiatric/Behavioral: Negative for behavioral problems, dysphoric mood, hallucinations, sleep disturbance and depressed mood. The patient is  not nervous/anxious.          PHYSICAL EXAMINATION:   VITAL SIGNS:     Vitals:    11/08/21 1351   BP: 118/72   Pulse: 67   Resp: 18   Temp: 97.8 °F (36.6 °C)   SpO2: 97%   Weight: 96.2 kg (212 lb)       Physical Exam   Constitutional: She appears well-developed and well-nourished.   HENT:   Head: Normocephalic.   Right Ear: External ear normal.   Left Ear: External ear normal.   Nose: Nose normal.   Eyes: Conjunctivae are normal.   Cardiovascular: Normal rate.   Pulmonary/Chest: Effort normal. No respiratory distress.   Abdominal: She exhibits no distension.   Musculoskeletal: No deformity.      Right knee: She exhibits decreased range of motion and swelling. She exhibits no effusion, no ecchymosis, no deformity, no erythema and normal alignment. Tenderness found. No patellar tendon tenderness noted.      Left knee: She exhibits decreased range of motion and swelling.        Legs:       Comments: Painful ROM of left knee     Lower extremity weakness   Neurological: She is alert.   Skin: Skin is warm and dry. No rash noted.   Psychiatric: Her behavior is normal.   Nursing note and vitals reviewed.      RECORDS REVIEW:   I have reviewed and interpreted the most recent labs    ASSESSMENT     Diagnoses and all orders for this visit:    1. Multiple falls (Primary)    2. Primary osteoarthritis of left knee    3. Status post right knee replacement    4. Impaired mobility and ADLs        PLAN       Multiple falls/status post right TKR/left  Knee OA/lower extremity weakness  -Patient with another fall on 11/7 but no injury.  She does not know exactly what happened with this fall but continues to feel it is related to the weakness in her left knee which she is having surgery on Thursday, as well as the weakness in her right knee status post recent replacement.  She has been educated multiple times on not ambulating without assistance.  Will follow-up and continue to monitor.    Patient was encouraged to keep me informed of  any acute changes, lack of improvement, or any new concerning symptoms.    Nursing and PT to continue supportive care as needed for mobilization and any assistance with ADLs.         [x]  Discussed Patient in detail with nursing/staff, addressed all needs today.     [x]  Plan of Care Reviewed   []  PT/OT Reviewed   [x]  Order Changes  []  Discharge Plans Reviewed  [x]  Advance Directive on file with Nursing Home.   [x]  POA on file with Nursing Home.   []  Code Status listed: [x]  Full Code   []  DNR       “I confirm accuracy of unchanged data/findings which have been carried forward from previous visit, as well as I have updated appropriately those that have changed.”                    Marlene Garcia, APRN.

## 2021-11-22 DIAGNOSIS — G62.9 NEUROPATHY: ICD-10-CM

## 2021-11-22 DIAGNOSIS — M25.562 CHRONIC PAIN OF LEFT KNEE: ICD-10-CM

## 2021-11-22 DIAGNOSIS — G40.309 NONINTRACTABLE GENERALIZED IDIOPATHIC EPILEPSY WITHOUT STATUS EPILEPTICUS (HCC): Chronic | ICD-10-CM

## 2021-11-22 DIAGNOSIS — G89.29 CHRONIC PAIN OF LEFT KNEE: ICD-10-CM

## 2021-11-22 RX ORDER — PREGABALIN 100 MG/1
100 CAPSULE ORAL 2 TIMES DAILY
Qty: 60 CAPSULE | Refills: 5 | Status: SHIPPED | OUTPATIENT
Start: 2021-11-22 | End: 2022-02-07 | Stop reason: SDUPTHER

## 2021-11-22 RX ORDER — CLONAZEPAM 0.5 MG/1
TABLET ORAL
Qty: 90 TABLET | Refills: 3 | Status: SHIPPED | OUTPATIENT
Start: 2021-11-22 | End: 2022-02-07 | Stop reason: SDUPTHER

## 2021-11-22 RX ORDER — HYDROCODONE BITARTRATE AND ACETAMINOPHEN 5; 325 MG/1; MG/1
1 TABLET ORAL EVERY 4 HOURS PRN
Qty: 120 TABLET | Refills: 0 | Status: SHIPPED | OUTPATIENT
Start: 2021-11-22 | End: 2022-02-23 | Stop reason: SDUPTHER

## 2021-11-22 RX ORDER — PHENOBARBITAL 64.8 MG/1
64.8 TABLET ORAL 2 TIMES DAILY
Qty: 60 TABLET | Refills: 5 | Status: SHIPPED | OUTPATIENT
Start: 2021-11-22 | End: 2022-01-07 | Stop reason: SDUPTHER

## 2021-11-22 NOTE — TELEPHONE ENCOUNTER
[facility] MEDICATION REQUEST FOR NORCO 5/325 MG, LYRICA 100 MG, PHENOBARBITAL 64.8 MG,  AND CLONAZEPAM 0.5 MG.    NORCO DIRECTIONS: TAKE 1 TAB PO Q 4 HRS PRN    CLONAZEPAM DIRECTIONS: TAKE 1 TAB PO Q AM AND TAKE 2 TABS PO QHS.    LYRICA DIRECTIONS: TAKE 1 TAB PO BID.    PHENOBARBITAL DIRECTIONS: TAKE 1 TAB PO BID

## 2021-12-01 ENCOUNTER — NURSING HOME (OUTPATIENT)
Dept: FAMILY MEDICINE CLINIC | Facility: CLINIC | Age: 66
End: 2021-12-01

## 2021-12-01 VITALS
TEMPERATURE: 98 F | OXYGEN SATURATION: 97 % | DIASTOLIC BLOOD PRESSURE: 53 MMHG | RESPIRATION RATE: 18 BRPM | WEIGHT: 212 LBS | SYSTOLIC BLOOD PRESSURE: 97 MMHG | BODY MASS INDEX: 38.78 KG/M2 | HEART RATE: 63 BPM

## 2021-12-01 DIAGNOSIS — Z96.652 STATUS POST TOTAL LEFT KNEE REPLACEMENT: Primary | ICD-10-CM

## 2021-12-01 DIAGNOSIS — Z78.9 IMPAIRED MOBILITY AND ADLS: Chronic | ICD-10-CM

## 2021-12-01 DIAGNOSIS — Z74.09 IMPAIRED MOBILITY AND ADLS: Chronic | ICD-10-CM

## 2021-12-01 PROCEDURE — 99309 SBSQ NF CARE MODERATE MDM 30: CPT | Performed by: NURSE PRACTITIONER

## 2021-12-02 NOTE — PROGRESS NOTES
Nursing Home Follow Up Note      Luigi Urena DO []   LATONIA Tony [x]  [address] [place] [address]  Phone: [phone]  Fax: [phone] Marcell Christina MD []    Rm Tran DO []   [address] [place] [address]  Phone: [phone]  Fax: [phone]     PATIENT NAME: Viviane Peñaloza                                                                          YOB: 1955           DATE OF SERVICE: 12/1/2021  FACILITY:  [][facility]   [] [facility]   [x] [facility]   [] [facility]   [] Other ______________________________________________________________________      CHIEF COMPLAINT:    Status post left TKR        HISTORY OF PRESENT ILLNESS:     Patient had left TKR on 11/29 and remained in hospital for 24 hour observation. She was readmitted to facility yesterday. She reports that she does have pain but pain medication is effective. She has been working with PT and doing well. She is concerned though because PT told her they are going to decrease the amount of time they apply her ice compression machine to her knee, to 15 minutes 3 times daily, but her Ortho surgeon ordered it to be applied 20-30 minutes 3 times per day. She reports this is the amount of time that was used for her right knee as well, and everything has went really well postoperatively with it. She reports that PT also discussed using her CPM machine differently than it was ordered as well, and she is concerned.       PAST MEDICAL & SURGICAL HISTORY:   Past Medical History:   Diagnosis Date   • Epilepsy (HCC)    • Fracture of unspecified phalanx of left index finger, initial encounter for closed fracture 1/16/2019   • Fracture, clavicle     left    • Shoulder fracture, left       Past Surgical History:   Procedure Laterality Date   • OOPHORECTOMY           MEDICATIONS:  I have reviewed and reconciled the patients medication list in the patients chart at the skilled nursing facility today.      ALLERGIES:  No  Known Allergies      SOCIAL HISTORY:  Social History     Socioeconomic History   • Marital status: Single   Tobacco Use   • Smoking status: Never Smoker   • Smokeless tobacco: Never Used   Substance and Sexual Activity   • Alcohol use: No   • Drug use: No   • Sexual activity: Defer       FAMILY HISTORY:  Family History   Problem Relation Age of Onset   • Diabetes Mother    • Heart disease Mother    • Hypertension Mother    • Hypertension Father    • Diabetes Sister    • Hypertension Sister        REVIEW OF SYSTEMS:  Review of Systems   Constitutional: Negative for activity change, appetite change, chills, diaphoresis, fatigue, fever, unexpected weight gain and unexpected weight loss.   HENT: Negative for congestion, ear pain, mouth sores, nosebleeds, postnasal drip, rhinorrhea, sinus pressure, sneezing, sore throat, swollen glands and trouble swallowing.    Eyes: Negative for blurred vision, pain, discharge, redness, itching and visual disturbance.   Respiratory: Negative for apnea, cough, choking, chest tightness, shortness of breath, wheezing and stridor.    Cardiovascular: Negative for chest pain, palpitations and leg swelling.   Gastrointestinal: Negative for abdominal distention, abdominal pain, blood in stool, constipation, diarrhea, nausea, vomiting, GERD and indigestion.   Endocrine: Negative for polydipsia and polyuria.   Genitourinary: Negative for decreased urine volume, difficulty urinating, dysuria, flank pain, frequency, hematuria, urgency and urinary incontinence.   Musculoskeletal: Positive for arthralgias, gait problem and myalgias. Negative for back pain and joint swelling.        S/p left TKR   Skin: Negative for color change, dry skin, rash and skin lesions.   Allergic/Immunologic: Negative for environmental allergies.   Neurological: Positive for seizures (none recently), weakness and memory problem. Negative for dizziness, speech difficulty and confusion.   Psychiatric/Behavioral: Negative for  behavioral problems, dysphoric mood, hallucinations, sleep disturbance and depressed mood. The patient is not nervous/anxious.          PHYSICAL EXAMINATION:   VITAL SIGNS:     Vitals:    12/01/21 1617   BP: 97/53   Pulse: 63   Resp: 18   Temp: 98 °F (36.7 °C)   SpO2: 97%   Weight: 96.2 kg (212 lb)       Physical Exam   Constitutional: She appears well-developed and well-nourished.   HENT:   Head: Normocephalic.   Right Ear: External ear normal.   Left Ear: External ear normal.   Nose: Nose normal.   Eyes: Conjunctivae are normal.   Cardiovascular: Normal rate.   Pulmonary/Chest: Effort normal. No respiratory distress.   Abdominal: She exhibits no distension.   Musculoskeletal: No deformity.      Right knee: She exhibits decreased range of motion. She exhibits no swelling, no effusion, no ecchymosis, no deformity, no erythema and normal alignment. No tenderness found. No patellar tendon tenderness noted.      Left knee: She exhibits decreased range of motion. Tenderness found.        Legs:       Comments: Painful ROM of left knee     Lower extremity weakness   Neurological: She is alert.   Skin: Skin is warm and dry. No rash noted.   Psychiatric: Her behavior is normal.   Nursing note and vitals reviewed.      RECORDS REVIEW:   I have reviewed and interpreted the most recent labs    ASSESSMENT     Diagnoses and all orders for this visit:    1. Status post total left knee replacement (Primary)    2. Impaired mobility and ADLs        PLAN       Status Post left TKR  -Pain well controlled with current regimen.  Nursing to advise PT that they are not to change the way her CPM machine is used or the length of time her ice compressions are applied unless approved by her orthopedic surgeon.  She will follow-up with Ortho as directed.  She is on aspirin for DVT prophylaxis.  Will follow-up and continue to monitor.    Patient was encouraged to keep me informed of any acute changes, lack of improvement, or any new concerning  symptoms.    Nursing and PT to continue supportive care as needed for mobilization and any assistance with ADLs.         [x]  Discussed Patient in detail with nursing/staff, addressed all needs today.     [x]  Plan of Care Reviewed   []  PT/OT Reviewed   [x]  Order Changes  []  Discharge Plans Reviewed  [x]  Advance Directive on file with Nursing Home.   [x]  POA on file with Nursing Home.   []  Code Status listed: [x]  Full Code   []  DNR       “I confirm accuracy of unchanged data/findings which have been carried forward from previous visit, as well as I have updated appropriately those that have changed.”                  Marlene Garcia, APRN.

## 2021-12-15 ENCOUNTER — NURSING HOME (OUTPATIENT)
Dept: FAMILY MEDICINE CLINIC | Facility: CLINIC | Age: 66
End: 2021-12-15

## 2021-12-15 VITALS
TEMPERATURE: 97.9 F | DIASTOLIC BLOOD PRESSURE: 60 MMHG | RESPIRATION RATE: 18 BRPM | SYSTOLIC BLOOD PRESSURE: 112 MMHG | OXYGEN SATURATION: 98 % | WEIGHT: 212 LBS | HEART RATE: 72 BPM | BODY MASS INDEX: 38.78 KG/M2

## 2021-12-15 DIAGNOSIS — Z78.9 IMPAIRED MOBILITY AND ADLS: Chronic | ICD-10-CM

## 2021-12-15 DIAGNOSIS — M17.0 PRIMARY OSTEOARTHRITIS OF BOTH KNEES: ICD-10-CM

## 2021-12-15 DIAGNOSIS — E55.9 VITAMIN D DEFICIENCY: ICD-10-CM

## 2021-12-15 DIAGNOSIS — Z74.09 IMPAIRED MOBILITY AND ADLS: Chronic | ICD-10-CM

## 2021-12-15 DIAGNOSIS — M25.561 ARTHRALGIA OF BOTH KNEES: ICD-10-CM

## 2021-12-15 DIAGNOSIS — G40.309 NONINTRACTABLE GENERALIZED IDIOPATHIC EPILEPSY WITHOUT STATUS EPILEPTICUS (HCC): Chronic | ICD-10-CM

## 2021-12-15 DIAGNOSIS — Z96.652 S/P TOTAL KNEE ARTHROPLASTY, LEFT: ICD-10-CM

## 2021-12-15 DIAGNOSIS — M17.12 PRIMARY OSTEOARTHRITIS OF LEFT KNEE: Primary | ICD-10-CM

## 2021-12-15 DIAGNOSIS — M25.562 ARTHRALGIA OF BOTH KNEES: ICD-10-CM

## 2021-12-15 PROCEDURE — 99309 SBSQ NF CARE MODERATE MDM 30: CPT | Performed by: FAMILY MEDICINE

## 2021-12-16 NOTE — PROGRESS NOTES
Nursing Home Progress Note        Luigi Urena DO [x]  LATONIA Tony []  [address] [place] [address]  Phone: [phone]  Fax: [phone] Marcell Christina MD []  Rm Tran DO []  [address] [place] [address]  Phone: [phone]  Fax: [phone]     PATIENT NAME: Viviane Peñaloza                                                                          YOB: 1955           DATE OF SERVICE: 12/15/2021  FACILITY: []  [facility]  [] [facility]  [x]  [facility]  [] [facility]  []  Other ______________________________________________________________________     CHIEF COMPLAINT:   Chronic medical management and long-term care/seizure activity      HISTORY OF PRESENT ILLNESS:   [x]  Follow Up visit for coordination of long term care issues and chronic medical management of Diagnoses and all orders for this visit:    1. Primary osteoarthritis of left knee (Primary)    2. Impaired mobility and ADLs    3. 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 Arthralgia of both knees    5. Primary osteoarthritis of both knees    6. Vitamin D deficiency    7. S/P total knee arthroplasty, left    Patient is status post total left knee arthroplasty.  She is now approximately 2 weeks post surgery date.  She has done increasingly well since returning from the hospital.  Pain well controlled at this time, tolerating all p.o. intake.  Physical therapy/occupational therapy going well.  No reports of any falls or injuries.  Clean dry surgical incision.    Vital signs have been stable.    No seizure-like activity reported.      PAST MEDICAL & SURGICAL HISTORY:   Past Medical History:   Diagnosis Date   • Epilepsy (HCC)    • Fracture of unspecified phalanx of left index finger, initial encounter for closed fracture 1/16/2019   • Fracture, clavicle     left    • Shoulder fracture, left       Past Surgical History:   Procedure Laterality Date   •  OOPHORECTOMY           MEDICATIONS:  I have reviewed and reconciled the patients medication list in the patients chart at the skilled nursing facility today.      ALLERGIES:    No Known Allergies      SOCIAL HISTORY:    Social History     Socioeconomic History   • Marital status: Single   Tobacco Use   • Smoking status: Never Smoker   • Smokeless tobacco: Never Used   Substance and Sexual Activity   • Alcohol use: No   • Drug use: No   • Sexual activity: Defer       FAMILY HISTORY:    Family History   Problem Relation Age of Onset   • Diabetes Mother    • Heart disease Mother    • Hypertension Mother    • Hypertension Father    • Diabetes Sister    • Hypertension Sister        REVIEW OF SYSTEMS:    Review of Systems  · Appetite: Fair []   Good [x]   Poor []   Weight Loss []  [x]  Weight Stable   Unavoidable Weight Loss []  Tolerating Tube Feeding []    Supplements Provided []   · Constitutional: Negative for fever, chills, diaphoresis or fatigue and weight change.   · HENT: No dysphagia; no changes to vision/hearing/smell/taste; no epistaxis  · Eyes: Negative for redness and visual disturbance.   · Respiratory: Negative for shortness of breath, chest pain, cough or chest tightness.   · Cardiovascular: Negative for chest pain and palpitations.   · Gastrointestinal: Negative for abdominal distention, abdominal pain and blood in stool.   · Endocrine: Negative for cold intolerance and heat intolerance.   · Genitourinary: Negative for difficulty urinating, dysuria and frequency.Negative for hematuria   · Musculoskeletal: Chronic myalgias and arthralgias, worse to the left knee but improving.  · Integumentary: No open wounds, rash or concerning skin lesions  · Neurological: Negative for syncope, weakness and headaches.   · Hematological: Negative for adenopathy. Does not bruise/bleed easily.   · Immunological: Negative for reported allergies or immunological disorders  · Psychological: No acute behavioral  changes    PHYSICAL EXAMINATION:   VITAL SIGNS:   Vitals:    12/15/21 1713   BP: 112/60   Pulse: 72   Resp: 18   Temp: 97.9 °F (36.6 °C)   SpO2: 98%       Physical Exam      General Appearance:  [x]  Alert   [x]  Oriented x person  [x]  No acute distress     []  Confused  []  Disoriented   []  Comatose   Head:  Atraumatic and normocephalic, without obvious abnormality.   Eyes:         PERRLA, conjunctivae and sclerae normal, no Icterus. No pallor. Extra-occular movements are within normal limits.   Ears:  Ears appear intact with no abnormalities noted.   Throat: No oral lesions, no thrush, oral mucosa moist.   Neck: Supple, trachea midline, no thyromegaly, no carotid bruit.   Back:   No kyphoscoliosis. No tenderness to palpation.   Lungs:   Chest shape is normal. Breath sounds heard bilaterally equally.  No wheezing.    Audible air exchange noted all lung fields.   Heart:  Normal S1 and S2, no murmur, no gallop, no rub. No JVD.   Abdomen:   Normal bowel sounds, no masses, no organomegaly. Soft, non-tender, non-distended, no guarding.  Mild truncal obesity.   Extremities: Moves all extremities; expected postsurgical edema to left lower extremity, cyanosis or clubbing.    Poor core strength and stability.   Requires assistance with transfers at times.  Utilizes walker for assistance with ambulation.  Quadriceps mechanism intact.  Johnathan/Nguyen test negative.  Symmetric and appropriate dorsiflexion and plantar flexion of bilateral feet.   Pulses: Pulses palpable and equal bilaterally.   Skin: No bleeding or rash.  Generalized dry skin noted.  Age-related atrophy of skin.  Clean dry surgical incision to the left knee.   Neurologic: [x] Normal speech []  Normal mental status    [x] Cranial nerves II through XII intact   [x]  No anosmia [x]  DTR 2+ [x]  Proprioception intact  [x]  No focal motor/sensory deficits      Psych/Mood:                    [x]  No acute changes []  Depressed  Urinary:                             [x]  Continent  []  Incontinent []  Retention  []  F/C      []  UTI w/treatment in progress           ASSESSMENT     Diagnoses and all orders for this visit:    1. Primary osteoarthritis of left knee (Primary)    2. Impaired mobility and ADLs    3. 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 Arthralgia of both knees    5. Primary osteoarthritis of both knees    6. Vitamin D deficiency    7. S/P total knee arthroplasty, left          PLAN  Continue current therapy plan, routine orthopedic aftercare.  Pain appears well-controlled at this time.  Keep scheduled follow-up appointment with orthopedic surgery.    Demonstrates no findings of seizure-like activity today, none reported since last visit.  Continue current treatment regimen.    Vital signs demonstrate hemodynamic stability.    Surveillance labs when needed.    Continue supportive care for any mobilization/transfer assistance, as well as ADLs of need.  Doing well with respect to nutrition/hydration.        [x]  Discussed Patient in detail with nursing/staff, addressed all needs today.     [x]  Plan of Care Reviewed   [x]  PT/OT Reviewed   []  Order Changes  []  Discharge Plans Reviewed   []  Code Status Changes     I spent 30 minutes caring for Viviane on this date of service. This time includes time spent by me in the following activities:preparing for the visit, performing a medically appropriate examination and/or evaluation , counseling and educating the patient/family/caregiver, ordering medications, tests, or procedures, documenting information in the medical record and care coordination    I have reviewed and updated all copied forward information, as appropriate.  I attest to the accuracy and relevance of any unchanged information.       Luigi Urena DO  12/15/2021

## 2022-01-03 ENCOUNTER — NURSING HOME (OUTPATIENT)
Dept: FAMILY MEDICINE CLINIC | Facility: CLINIC | Age: 67
End: 2022-01-03

## 2022-01-03 VITALS
RESPIRATION RATE: 18 BRPM | BODY MASS INDEX: 38.78 KG/M2 | HEART RATE: 76 BPM | WEIGHT: 212 LBS | TEMPERATURE: 97.6 F | DIASTOLIC BLOOD PRESSURE: 64 MMHG | OXYGEN SATURATION: 95 % | SYSTOLIC BLOOD PRESSURE: 108 MMHG

## 2022-01-03 DIAGNOSIS — M25.462 SWELLING OF LEFT KNEE JOINT: Primary | ICD-10-CM

## 2022-01-03 DIAGNOSIS — Z96.652 STATUS POST TOTAL KNEE REPLACEMENT, LEFT: ICD-10-CM

## 2022-01-03 DIAGNOSIS — Z78.9 IMPAIRED MOBILITY AND ADLS: Chronic | ICD-10-CM

## 2022-01-03 DIAGNOSIS — Z74.09 IMPAIRED MOBILITY AND ADLS: Chronic | ICD-10-CM

## 2022-01-03 PROCEDURE — 99309 SBSQ NF CARE MODERATE MDM 30: CPT | Performed by: NURSE PRACTITIONER

## 2022-01-04 PROBLEM — Z96.652 STATUS POST TOTAL KNEE REPLACEMENT, LEFT: Status: ACTIVE | Noted: 2022-01-04

## 2022-01-04 NOTE — PROGRESS NOTES
Nursing Home Follow Up Note      Luigi Urena DO []   LATONIA Tony [x]  [address] [place] [address]  Phone: [phone]  Fax: [phone] Marcell Christina MD []    Rm Tran DO []   [address] [place] [address]  Phone: [phone]  Fax: [phone]     PATIENT NAME: Viviane Peñaloza                                                                          YOB: 1955           DATE OF SERVICE: 1/3/2022  FACILITY:  [][facility]   [] [place]   [x] [facility]   [] [facility]   [] Other ______________________________________________________________________      CHIEF COMPLAINT:    Left knee swelling for the past several days, status post left TKR.        HISTORY OF PRESENT ILLNESS:     Patient had left TKR on 11/29 and has been doing very well post op. She has complaints today of some swelling to the knee for the past 5 days. She reports she had some redness, warmth and swelling last Wednesday, but the redness was gone Thursday and she has just had some swelling and warmth since. There is not any increased pain, or tenderness, just increased swelling and warmth. No known injury or other complication. No other complaints today. She is up in wheelchair moving about facility.     PAST MEDICAL & SURGICAL HISTORY:   Past Medical History:   Diagnosis Date   • Epilepsy (HCC)    • Fracture of unspecified phalanx of left index finger, initial encounter for closed fracture 1/16/2019   • Fracture, clavicle     left    • Shoulder fracture, left       Past Surgical History:   Procedure Laterality Date   • OOPHORECTOMY           MEDICATIONS:  I have reviewed and reconciled the patients medication list in the patients chart at the skilled nursing facility today.      ALLERGIES:  No Known Allergies      SOCIAL HISTORY:  Social History     Socioeconomic History   • Marital status: Single   Tobacco Use   • Smoking status: Never Smoker   • Smokeless tobacco: Never Used   Substance and  Sexual Activity   • Alcohol use: No   • Drug use: No   • Sexual activity: Defer       FAMILY HISTORY:  Family History   Problem Relation Age of Onset   • Diabetes Mother    • Heart disease Mother    • Hypertension Mother    • Hypertension Father    • Diabetes Sister    • Hypertension Sister        REVIEW OF SYSTEMS:  Review of Systems   Constitutional: Negative for activity change, appetite change, chills, diaphoresis, fatigue, fever, unexpected weight gain and unexpected weight loss.   HENT: Negative for congestion, ear pain, mouth sores, nosebleeds, postnasal drip, rhinorrhea, sinus pressure, sneezing, sore throat, swollen glands and trouble swallowing.    Eyes: Negative for blurred vision, pain, discharge, redness, itching and visual disturbance.   Respiratory: Negative for apnea, cough, choking, chest tightness, shortness of breath, wheezing and stridor.    Cardiovascular: Negative for chest pain, palpitations and leg swelling.   Gastrointestinal: Negative for abdominal distention, abdominal pain, blood in stool, constipation, diarrhea, nausea, vomiting, GERD and indigestion.   Endocrine: Negative for polydipsia and polyuria.   Genitourinary: Negative for decreased urine volume, difficulty urinating, dysuria, flank pain, frequency, hematuria, urgency and urinary incontinence.   Musculoskeletal: Positive for arthralgias and gait problem. Negative for back pain, joint swelling and myalgias.        Swelling left knee   Skin: Negative for color change, dry skin, rash and skin lesions.   Allergic/Immunologic: Negative for environmental allergies.   Neurological: Positive for seizures (none recently), weakness and memory problem. Negative for dizziness, speech difficulty and confusion.   Psychiatric/Behavioral: Negative for behavioral problems, dysphoric mood, hallucinations, sleep disturbance and depressed mood. The patient is not nervous/anxious.          PHYSICAL EXAMINATION:   VITAL SIGNS:     Vitals:    01/03/22  1437   BP: 108/64   Pulse: 76   Resp: 18   Temp: 97.6 °F (36.4 °C)   SpO2: 95%   Weight: 96.2 kg (212 lb)       Physical Exam   Constitutional: She appears well-developed and well-nourished.   HENT:   Head: Normocephalic.   Right Ear: External ear normal.   Left Ear: External ear normal.   Nose: Nose normal.   Eyes: Conjunctivae are normal.   Cardiovascular: Normal rate.   Pulmonary/Chest: Effort normal. No respiratory distress.   Abdominal: She exhibits no distension.   Musculoskeletal: No deformity.      Right knee: She exhibits decreased range of motion. She exhibits no swelling, no effusion, no ecchymosis, no deformity, no erythema and normal alignment. No tenderness found. No patellar tendon tenderness noted.      Left knee: She exhibits decreased range of motion and swelling. She exhibits no erythema (no redness or warmth noted).      Comments:     Lower extremity weakness   Neurological: She is alert.   Skin: Skin is warm and dry. No rash noted.   Psychiatric: Her behavior is normal.   Nursing note and vitals reviewed.      RECORDS REVIEW:   I have reviewed and interpreted the most recent labs    ASSESSMENT     Diagnoses and all orders for this visit:    1. Swelling of left knee joint (Primary)    2. Status post total knee replacement, left    3. Impaired mobility and ADLs        PLAN       Status Post left TKR/knee swelling  - Xray obtained of left knee and reports normal findings of hardware and normal soft tissues, no acute findings. Will have nursing notify Ortho of patient concerns and see if they would like to see her sooner than her appointment on 1/24. Patient to continue PT and post operative interventions as directed. Will follow up and continue to monitor.     Patient was encouraged to keep me informed of any acute changes, lack of improvement, or any new concerning symptoms.    Nursing and PT to continue supportive care as needed for mobilization and any assistance with ADLs.         [x]   Discussed Patient in detail with nursing/staff, addressed all needs today.     [x]  Plan of Care Reviewed   []  PT/OT Reviewed   [x]  Order Changes  []  Discharge Plans Reviewed  [x]  Advance Directive on file with Nursing Home.   [x]  POA on file with Nursing Home.   []  Code Status listed: [x]  Full Code   []  DNR       “I confirm accuracy of unchanged data/findings which have been carried forward from previous visit, as well as I have updated appropriately those that have changed.”                  Marlene Garcia, APRN.

## 2022-01-07 ENCOUNTER — NURSING HOME (OUTPATIENT)
Dept: FAMILY MEDICINE CLINIC | Facility: CLINIC | Age: 67
End: 2022-01-07

## 2022-01-07 VITALS
HEART RATE: 64 BPM | TEMPERATURE: 98.4 F | DIASTOLIC BLOOD PRESSURE: 54 MMHG | OXYGEN SATURATION: 94 % | SYSTOLIC BLOOD PRESSURE: 104 MMHG

## 2022-01-07 DIAGNOSIS — R05.8 RESPIRATORY TRACT CONGESTION WITH COUGH: ICD-10-CM

## 2022-01-07 DIAGNOSIS — G40.309 NONINTRACTABLE GENERALIZED IDIOPATHIC EPILEPSY WITHOUT STATUS EPILEPTICUS: Chronic | ICD-10-CM

## 2022-01-07 DIAGNOSIS — Z78.9 IMPAIRED MOBILITY AND ADLS: Chronic | ICD-10-CM

## 2022-01-07 DIAGNOSIS — Z74.09 IMPAIRED MOBILITY AND ADLS: Chronic | ICD-10-CM

## 2022-01-07 DIAGNOSIS — J20.9 ACUTE BRONCHITIS, UNSPECIFIED ORGANISM: Primary | ICD-10-CM

## 2022-01-07 PROCEDURE — 99309 SBSQ NF CARE MODERATE MDM 30: CPT | Performed by: NURSE PRACTITIONER

## 2022-01-07 RX ORDER — PHENOBARBITAL 64.8 MG/1
64.8 TABLET ORAL 2 TIMES DAILY
Qty: 60 TABLET | Refills: 5 | Status: SHIPPED | OUTPATIENT
Start: 2022-01-07 | End: 2022-04-03 | Stop reason: SDUPTHER

## 2022-01-07 RX ORDER — PREGABALIN 100 MG/1
100 CAPSULE ORAL 2 TIMES DAILY
Qty: 60 CAPSULE | Refills: 0 | Status: SHIPPED | OUTPATIENT
Start: 2022-01-07 | End: 2022-04-03

## 2022-01-07 NOTE — TELEPHONE ENCOUNTER
[facility] MEDICATION REQUEST FOR PHENOBARBITAL 64.8 MG AND LYRICA 100.    PHENOBARBITAL DIRECTIONS: TAKE 1 TAB PO BID.    LYRICA DIRECTIONS: TAKE 1 TAB PO BID.

## 2022-01-09 NOTE — PROGRESS NOTES
Nursing Home Follow Up Note      Luigi Urena DO []   LATONIA Tony [x]  [address] [place] [address]  Phone: [phone]  Fax: [phone] Marcell Christina MD []    Rm Tran DO []   [address] [place] [address]  Phone: [phone]  Fax: [phone]     PATIENT NAME: Viviane Peñaloza                                                                          YOB: 1955           DATE OF SERVICE: 1/7/2022  FACILITY:  [][facility]   [] [place]   [x] [facility]   [] [facility]   [] Other ______________________________________________________________________      CHIEF COMPLAINT:    Increased cough and congestion since this morning.         HISTORY OF PRESENT ILLNESS:     Patient has complaints of increased cough and congestion since this morning. She does not have any shortness of breath or fever, no other abnormal VS. Her roommate had this earlier in the week and had bronchitis. She would like to get started on some treatment so it does not turn into pneumonia. She has no other complaints today, just does not feel good related to her upper respiratory symptoms.    PAST MEDICAL & SURGICAL HISTORY:   Past Medical History:   Diagnosis Date   • Epilepsy (HCC)    • Fracture of unspecified phalanx of left index finger, initial encounter for closed fracture 1/16/2019   • Fracture, clavicle     left    • Shoulder fracture, left       Past Surgical History:   Procedure Laterality Date   • OOPHORECTOMY           MEDICATIONS:  I have reviewed and reconciled the patients medication list in the patients chart at the skilled nursing facility today.      ALLERGIES:  No Known Allergies      SOCIAL HISTORY:  Social History     Socioeconomic History   • Marital status: Single   Tobacco Use   • Smoking status: Never Smoker   • Smokeless tobacco: Never Used   Substance and Sexual Activity   • Alcohol use: No   • Drug use: No   • Sexual activity: Defer       FAMILY HISTORY:  Family History    Problem Relation Age of Onset   • Diabetes Mother    • Heart disease Mother    • Hypertension Mother    • Hypertension Father    • Diabetes Sister    • Hypertension Sister        REVIEW OF SYSTEMS:  Review of Systems   Constitutional: Negative for activity change, appetite change, chills, diaphoresis, fatigue, fever, unexpected weight gain and unexpected weight loss.   HENT: Positive for congestion, postnasal drip, rhinorrhea and sinus pressure. Negative for ear pain, mouth sores, nosebleeds, sneezing, sore throat, swollen glands and trouble swallowing.    Eyes: Negative for blurred vision, pain, discharge, redness, itching and visual disturbance.   Respiratory: Positive for cough and wheezing. Negative for choking, chest tightness and shortness of breath.    Cardiovascular: Negative for chest pain, palpitations and leg swelling.   Gastrointestinal: Negative for abdominal distention, abdominal pain, blood in stool, constipation, diarrhea, nausea, vomiting and GERD.   Endocrine: Negative for polydipsia and polyuria.   Genitourinary: Negative for decreased urine volume, difficulty urinating, dysuria, flank pain, frequency, hematuria, urgency and urinary incontinence.   Musculoskeletal: Positive for arthralgias and gait problem. Negative for back pain, joint swelling and myalgias.        Swelling left knee   Skin: Negative for color change, dry skin, rash and skin lesions.   Allergic/Immunologic: Negative for environmental allergies.   Neurological: Positive for seizures (none recently), weakness and memory problem. Negative for dizziness, speech difficulty and confusion.   Psychiatric/Behavioral: Negative for behavioral problems, dysphoric mood, hallucinations, sleep disturbance and depressed mood. The patient is not nervous/anxious.          PHYSICAL EXAMINATION:   VITAL SIGNS:     Vitals:    01/07/22 1454   BP: 104/54   Pulse: 64   Temp: 98.4 °F (36.9 °C)   SpO2: 94%       Physical Exam   Constitutional: She  appears well-developed and well-nourished.   HENT:   Head: Normocephalic.   Right Ear: External ear normal.   Left Ear: External ear normal.   Nose: Nose normal.   Eyes: Conjunctivae are normal.   Cardiovascular: Normal rate.   Pulmonary/Chest: Effort normal. No respiratory distress. She has wheezes in the right upper field and the left upper field.   Abdominal: She exhibits no distension.   Musculoskeletal: No deformity.      Right knee: No tenderness found.      Comments:     Lower extremity weakness   Neurological: She is alert.   Skin: Skin is warm and dry. No rash noted.   Psychiatric: Her behavior is normal.   Nursing note and vitals reviewed.      RECORDS REVIEW:   I have reviewed and interpreted the most recent labs    ASSESSMENT     Diagnoses and all orders for this visit:    1. Acute bronchitis, unspecified organism (Primary)    2. Respiratory tract congestion with cough    3. Impaired mobility and ADLs        PLAN       Acute bronchitis/URI with cough and congestion  -Will get a CXR. Give Prednisone 10 mg po bid x 5 days. Give Duo Nebs q 6 hours x 7 days. Give Doxy 100 mg po bid x 7 days. Will follow up and continue to monitor.    Patient was encouraged to keep me informed of any acute changes, lack of improvement, or any new concerning symptoms.    Nursing and PT to continue supportive care as needed for mobilization and any assistance with ADLs.         [x]  Discussed Patient in detail with nursing/staff, addressed all needs today.     [x]  Plan of Care Reviewed   []  PT/OT Reviewed   [x]  Order Changes  []  Discharge Plans Reviewed  [x]  Advance Directive on file with Nursing Home.   [x]  POA on file with Nursing Home.   []  Code Status listed: [x]  Full Code   []  DNR       “I confirm accuracy of unchanged data/findings which have been carried forward from previous visit, as well as I have updated appropriately those that have changed.”                    Marlene Garcia, APRN.

## 2022-01-19 ENCOUNTER — NURSING HOME (OUTPATIENT)
Dept: FAMILY MEDICINE CLINIC | Facility: CLINIC | Age: 67
End: 2022-01-19

## 2022-01-19 DIAGNOSIS — M17.0 PRIMARY OSTEOARTHRITIS OF BOTH KNEES: ICD-10-CM

## 2022-01-19 DIAGNOSIS — M25.561 ARTHRALGIA OF BOTH KNEES: ICD-10-CM

## 2022-01-19 DIAGNOSIS — M25.562 ARTHRALGIA OF BOTH KNEES: ICD-10-CM

## 2022-01-19 DIAGNOSIS — Z78.9 IMPAIRED MOBILITY AND ADLS: Primary | Chronic | ICD-10-CM

## 2022-01-19 DIAGNOSIS — G40.309 NONINTRACTABLE GENERALIZED IDIOPATHIC EPILEPSY WITHOUT STATUS EPILEPTICUS: Chronic | ICD-10-CM

## 2022-01-19 DIAGNOSIS — Z96.652 STATUS POST TOTAL KNEE REPLACEMENT, LEFT: ICD-10-CM

## 2022-01-19 DIAGNOSIS — Z74.09 IMPAIRED MOBILITY AND ADLS: Primary | Chronic | ICD-10-CM

## 2022-01-19 PROCEDURE — 99309 SBSQ NF CARE MODERATE MDM 30: CPT | Performed by: FAMILY MEDICINE

## 2022-01-20 VITALS
WEIGHT: 203 LBS | TEMPERATURE: 97.5 F | HEART RATE: 76 BPM | BODY MASS INDEX: 37.13 KG/M2 | DIASTOLIC BLOOD PRESSURE: 74 MMHG | SYSTOLIC BLOOD PRESSURE: 114 MMHG | RESPIRATION RATE: 18 BRPM | OXYGEN SATURATION: 97 %

## 2022-01-31 NOTE — PROGRESS NOTES
Nursing Home Progress Note        Luigi Urena DO [x]  LATONIA Tony []  [address] [place] [address]  Phone: [phone]  Fax: [phone] Marcell Christina MD []  Rm Tran DO []  [address] [place] [address]  Phone: [phone]  Fax: [phone]     PATIENT NAME: Viviane Peñaloza                                                                          YOB: 1955           DATE OF SERVICE: 1/19/2022  FACILITY: []  [facility]  [] [facility]  [x]  [facility]  [] [facility]  []  Other ______________________________________________________________________     CHIEF COMPLAINT:   Chronic medical management and long-term care/seizure activity      HISTORY OF PRESENT ILLNESS:   [x]  Follow Up visit for coordination of long term care issues and chronic medical management of Diagnoses and all orders for this visit:    1. Impaired mobility and ADLs (Primary)    2. 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 Primary osteoarthritis of both knees    4. Status post total knee replacement, left    5. Arthralgia of both knees    Patient continues to tolerate therapy with respect to routine orthopedic aftercare of her left knee, status post total knee arthroplasty.  No reports of fever, chills or night sweats.  Pain well controlled.  No reports of falls or injuries.    No seizure-like activity.    Diuresing well, vital signs have been stable.    Tolerating all p.o. intake, no nutritional/hydration deficits reported.      PAST MEDICAL & SURGICAL HISTORY:   Past Medical History:   Diagnosis Date   • Epilepsy (HCC)    • Fracture of unspecified phalanx of left index finger, initial encounter for closed fracture 1/16/2019   • Fracture, clavicle     left    • Shoulder fracture, left       Past Surgical History:   Procedure Laterality Date   • OOPHORECTOMY           MEDICATIONS:  I have reviewed and reconciled the patients medication list in the  patients chart at the skilled nursing facility today.      ALLERGIES:    No Known Allergies      SOCIAL HISTORY:    Social History     Socioeconomic History   • Marital status: Single   Tobacco Use   • Smoking status: Never Smoker   • Smokeless tobacco: Never Used   Substance and Sexual Activity   • Alcohol use: No   • Drug use: No   • Sexual activity: Defer       FAMILY HISTORY:    Family History   Problem Relation Age of Onset   • Diabetes Mother    • Heart disease Mother    • Hypertension Mother    • Hypertension Father    • Diabetes Sister    • Hypertension Sister        REVIEW OF SYSTEMS:    Review of Systems  · Appetite: Fair []   Good [x]   Poor []   Weight Loss []  [x]  Weight Stable   Unavoidable Weight Loss []  Tolerating Tube Feeding []    Supplements Provided []   · Constitutional: Negative for fever, chills, diaphoresis or fatigue and weight change.   · HENT: No dysphagia; no changes to vision/hearing/smell/taste; no epistaxis  · Eyes: Negative for redness and visual disturbance.   · Respiratory: Negative for shortness of breath, chest pain, cough or chest tightness.   · Cardiovascular: Negative for chest pain and palpitations.   · Gastrointestinal: Negative for abdominal distention, abdominal pain and blood in stool.   · Endocrine: Negative for cold intolerance and heat intolerance.   · Genitourinary: Negative for difficulty urinating, dysuria and frequency.Negative for hematuria   · Musculoskeletal: Chronic myalgias and arthralgias, worse to the left knee but improving.  · Integumentary: Postsurgical scarring noted to the left knee.  No drainage or streaking erythema.  · Neurological: Negative for syncope, weakness and headaches.   · Hematological: Negative for adenopathy. Does not bruise/bleed easily.   · Immunological: Negative for reported allergies or immunological disorders  · Psychological: No acute behavioral changes    PHYSICAL EXAMINATION:   VITAL SIGNS:   Vitals:    01/20/22 1546   BP:  114/74   Pulse: 76   Resp: 18   Temp: 97.5 °F (36.4 °C)   SpO2: 97%       Physical Exam      General Appearance:  [x]  Alert   [x]  Oriented x person  [x]  No acute distress     []  Confused  []  Disoriented   []  Comatose   Head:  Atraumatic and normocephalic, without obvious abnormality.   Eyes:         PERRLA, conjunctivae and sclerae normal, no Icterus. No pallor. Extra-occular movements are within normal limits.   Ears:  Ears appear intact with no abnormalities noted.   Throat: No oral lesions, no thrush, oral mucosa moist.   Neck: Supple, trachea midline, no thyromegaly, no carotid bruit.   Back:   No kyphoscoliosis. No tenderness to palpation.   Lungs:   Chest shape is normal. Breath sounds heard bilaterally equally.  No wheezing.    Audible air exchange noted all lung fields.   Heart:  Normal S1 and S2, no murmur, no gallop, no rub. No JVD.   Abdomen:   Normal bowel sounds, no masses, no organomegaly. Soft, non-tender, non-distended, no guarding.  Mild truncal obesity.   Extremities: Moves all extremities; expected postsurgical edema to left lower extremity, cyanosis or clubbing.    Poor core strength and stability.   Requires assistance with transfers at times.  Utilizes walker for assistance with ambulation.  Quadriceps mechanism intact.  Johnathan/Nguyen test negative.  Symmetric and appropriate dorsiflexion and plantar flexion of bilateral feet.   Pulses: Pulses palpable and equal bilaterally.   Skin: No bleeding or rash.  Generalized dry skin noted.  Age-related atrophy of skin.  Clean dry surgical incision to the left knee, well approximated edges.   Neurologic: [x] Normal speech []  Normal mental status    [x] Cranial nerves II through XII intact   [x]  No anosmia [x]  DTR 2+ [x]  Proprioception intact  [x]  No focal motor/sensory deficits      Psych/Mood:                    [x]  No acute changes []  Depressed  Urinary:                            [x]  Continent  []  Incontinent []  Retention  []  F/C       []  UTI w/treatment in progress           ASSESSMENT     Diagnoses and all orders for this visit:    1. Impaired mobility and ADLs (Primary)    2. 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 Primary osteoarthritis of both knees    4. Status post total knee replacement, left    5. Arthralgia of both knees          PLAN  Continue current supportive care for any mobilization/transfer assistance of knee, as well as routine orthopedic aftercare PT/OT.  Fall precautions in place.  Advance activity as tolerated.  Keep scheduled follow-up appointment with orthopedic surgery.    Continue current antiepileptic regimen.  No seizure-like activity reported since last visit.    Pain appears clinically well controlled.    Signs demonstrate hemodynamic stability.    Surveillance labs when needed.      [x]  Discussed Patient in detail with nursing/staff, addressed all needs today.     [x]  Plan of Care Reviewed   [x]  PT/OT Reviewed   []  Order Changes  []  Discharge Plans Reviewed   []  Code Status Changes     I spent 30 minutes caring for Viviane on this date of service. This time includes time spent by me in the following activities:preparing for the visit, performing a medically appropriate examination and/or evaluation , counseling and educating the patient/family/caregiver, ordering medications, tests, or procedures, documenting information in the medical record and care coordination    I have reviewed and updated all copied forward information, as appropriate.  I attest to the accuracy and relevance of any unchanged information.       Luigi Urena DO  1/19/2022

## 2022-02-03 ENCOUNTER — DOCUMENTATION (OUTPATIENT)
Dept: FAMILY MEDICINE CLINIC | Facility: CLINIC | Age: 67
End: 2022-02-03
Payer: MEDICARE

## 2022-02-03 VITALS
OXYGEN SATURATION: 93 % | SYSTOLIC BLOOD PRESSURE: 122 MMHG | HEART RATE: 79 BPM | DIASTOLIC BLOOD PRESSURE: 72 MMHG | RESPIRATION RATE: 16 BRPM | TEMPERATURE: 97.6 F

## 2022-02-07 DIAGNOSIS — G62.9 NEUROPATHY: ICD-10-CM

## 2022-02-07 RX ORDER — PREGABALIN 100 MG/1
100 CAPSULE ORAL 2 TIMES DAILY
Qty: 60 CAPSULE | Refills: 5 | Status: SHIPPED | OUTPATIENT
Start: 2022-02-07 | End: 2022-04-03 | Stop reason: SDUPTHER

## 2022-02-07 RX ORDER — CLONAZEPAM 0.5 MG/1
TABLET ORAL
Qty: 90 TABLET | Refills: 3 | Status: SHIPPED | OUTPATIENT
Start: 2022-02-07 | End: 2022-04-03 | Stop reason: SDUPTHER

## 2022-02-07 NOTE — TELEPHONE ENCOUNTER
[facility] MEDICATION REQUEST FOR CLONAZEPAM 0.5 MG AND LYRICA 100 MG.    CLONAZEPAM DIRECTIONS: TAKE 1 TAB PO ONCE DAILY, AND TAKE 2 TABS PO AT BEDTIME.    LYRICA DIRECTIONS: TAKE 1 TAB PO BID

## 2022-02-23 DIAGNOSIS — M25.562 CHRONIC PAIN OF LEFT KNEE: ICD-10-CM

## 2022-02-23 DIAGNOSIS — G89.29 CHRONIC PAIN OF LEFT KNEE: ICD-10-CM

## 2022-02-23 RX ORDER — HYDROCODONE BITARTRATE AND ACETAMINOPHEN 5; 325 MG/1; MG/1
1 TABLET ORAL EVERY 4 HOURS PRN
Qty: 90 TABLET | Refills: 0 | Status: SHIPPED | OUTPATIENT
Start: 2022-02-23

## 2022-02-23 NOTE — TELEPHONE ENCOUNTER
[facility] requesting medication refill for Norco 5-325 mg.    Directions: Norco 5-325 mg 1 tablet po q 4hrs prn

## 2022-03-16 ENCOUNTER — DOCUMENTATION (OUTPATIENT)
Dept: FAMILY MEDICINE CLINIC | Facility: CLINIC | Age: 67
End: 2022-03-16
Payer: MEDICARE

## 2022-03-16 VITALS
WEIGHT: 207 LBS | TEMPERATURE: 97.6 F | SYSTOLIC BLOOD PRESSURE: 102 MMHG | BODY MASS INDEX: 37.86 KG/M2 | OXYGEN SATURATION: 98 % | HEART RATE: 72 BPM | DIASTOLIC BLOOD PRESSURE: 56 MMHG | RESPIRATION RATE: 18 BRPM

## 2022-04-03 DIAGNOSIS — G62.9 NEUROPATHY: ICD-10-CM

## 2022-04-03 DIAGNOSIS — G40.309 NONINTRACTABLE GENERALIZED IDIOPATHIC EPILEPSY WITHOUT STATUS EPILEPTICUS: Chronic | ICD-10-CM

## 2022-04-03 RX ORDER — PHENOBARBITAL 64.8 MG/1
64.8 TABLET ORAL 2 TIMES DAILY
Qty: 30 TABLET | Refills: 0 | Status: SHIPPED | OUTPATIENT
Start: 2022-04-03 | End: 2022-04-16

## 2022-04-03 RX ORDER — CLONAZEPAM 0.5 MG/1
TABLET ORAL
Qty: 45 TABLET | Refills: 0 | Status: SHIPPED | OUTPATIENT
Start: 2022-04-03 | End: 2022-04-20

## 2022-04-03 RX ORDER — PREGABALIN 100 MG/1
100 CAPSULE ORAL 2 TIMES DAILY
Qty: 30 CAPSULE | Refills: 0 | Status: SHIPPED | OUTPATIENT
Start: 2022-04-03 | End: 2022-04-20

## 2022-04-04 ENCOUNTER — TELEPHONE (OUTPATIENT)
Dept: FAMILY MEDICINE CLINIC | Facility: CLINIC | Age: 67
End: 2022-04-04

## 2022-04-04 NOTE — TELEPHONE ENCOUNTER
Medication was written for BID # 30.  Should it be daily or #60 or should this just be a 2 week RX?

## 2022-04-04 NOTE — TELEPHONE ENCOUNTER
Pharmacy Name: [facility] [place] - [address] - [phone] [facility] [phone]      Pharmacy representative name: JIMBO    Pharmacy representative phone number: [phone]    What medication are you calling in regards to:    PHENobarbital (LUMINAL) 64.8 MG tablet     What question does the pharmacy have: CLARIFICATION ON DOSAGE.      Who is the provider that prescribed the medication: MALCOM    Additional notes: PHARMACY STATED THEY'VE FILLED PRESCRIPTION OF 60 MG IN THE PAST AND REQUESTING CLARIFICATION IF THE DOCTOR DOES WANT THE 64.8 MG

## 2022-04-14 DIAGNOSIS — G40.309 NONINTRACTABLE GENERALIZED IDIOPATHIC EPILEPSY WITHOUT STATUS EPILEPTICUS: Chronic | ICD-10-CM

## 2022-04-15 ENCOUNTER — TELEPHONE (OUTPATIENT)
Dept: FAMILY MEDICINE CLINIC | Facility: CLINIC | Age: 67
End: 2022-04-15

## 2022-04-16 RX ORDER — PHENOBARBITAL 60 MG/1
TABLET ORAL
Qty: 30 TABLET | Refills: 2 | Status: SHIPPED | OUTPATIENT
Start: 2022-04-16 | End: 2022-04-20

## 2022-04-18 DIAGNOSIS — G62.9 NEUROPATHY: ICD-10-CM

## 2022-04-18 DIAGNOSIS — G40.309 NONINTRACTABLE GENERALIZED IDIOPATHIC EPILEPSY WITHOUT STATUS EPILEPTICUS: Chronic | ICD-10-CM

## 2022-04-20 ENCOUNTER — TELEPHONE (OUTPATIENT)
Dept: FAMILY MEDICINE CLINIC | Facility: CLINIC | Age: 67
End: 2022-04-20

## 2022-04-20 RX ORDER — PREGABALIN 100 MG/1
CAPSULE ORAL
Qty: 60 CAPSULE | Refills: 2 | Status: SHIPPED | OUTPATIENT
Start: 2022-04-20 | End: 2022-04-20

## 2022-04-20 RX ORDER — CLONAZEPAM 0.5 MG/1
TABLET ORAL
Qty: 90 TABLET | Refills: 2 | Status: SHIPPED | OUTPATIENT
Start: 2022-04-20 | End: 2022-04-20

## 2022-04-20 NOTE — TELEPHONE ENCOUNTER
[facility] called and sts pt is getting pregabalin, clonazepam and phenobarbital by a Neurologist with .

## 2022-04-20 NOTE — TELEPHONE ENCOUNTER
CHRISS WITH [facility] CALLED TO INFORM PROVIDER THAT THEY HAVE RECEIVED REFILL FOR RX  clonazePAM (KlonoPIN) 0.5 MG tablet     AND   pregabalin (LYRICA) 100 MG capsule     FROM ANOTHER PROVIDER FOR THE PT.    PLEASE ADVISE.  CALL BACK:[phone]

## 2023-06-16 ENCOUNTER — DOCUMENTATION (OUTPATIENT)
Dept: FAMILY MEDICINE CLINIC | Facility: CLINIC | Age: 68
End: 2023-06-16
Payer: MEDICARE

## 2023-06-16 PROBLEM — M62.81 MUSCLE WEAKNESS (GENERALIZED): Status: ACTIVE | Noted: 2023-06-16

## 2023-06-16 PROBLEM — R53.81 OTHER MALAISE: Status: ACTIVE | Noted: 2023-06-16

## 2023-06-16 PROBLEM — R29.6 REPEATED FALLS: Status: ACTIVE | Noted: 2023-06-16

## 2023-06-16 PROBLEM — K59.00 CONSTIPATION, UNSPECIFIED: Status: ACTIVE | Noted: 2023-06-16

## 2023-06-16 PROBLEM — G40.909 EPILEPSY, UNSPECIFIED, NOT INTRACTABLE, WITHOUT STATUS EPILEPTICUS: Status: ACTIVE | Noted: 2018-07-09

## 2023-06-16 RX ORDER — PREGABALIN 100 MG/1
100 CAPSULE ORAL 2 TIMES DAILY
Qty: 60 CAPSULE | Refills: 3 | Status: SHIPPED | OUTPATIENT
Start: 2023-06-16

## 2023-06-16 RX ORDER — CLONAZEPAM 0.5 MG/1
0.5 TABLET ORAL 2 TIMES DAILY
Qty: 60 TABLET | Refills: 3 | Status: SHIPPED | OUTPATIENT
Start: 2023-06-16

## 2023-06-16 NOTE — TELEPHONE ENCOUNTER
(NEW ADMIT)      [facility] REQUESTING MED REFILL FOR LYRICA 100 MG  AND CLONAZEPAM 0.5 MG.    DIRECTIONS: LYRICA 100 MG 1 CAP PO BID.    CLONAZEPAM 0.5 MG 1 PO BID.

## 2023-06-19 RX ORDER — METOPROLOL SUCCINATE 25 MG/1
25 TABLET, EXTENDED RELEASE ORAL DAILY
COMMUNITY

## 2023-06-19 RX ORDER — SENNA PLUS 8.6 MG/1
1 TABLET ORAL DAILY PRN
COMMUNITY

## 2023-06-19 RX ORDER — ASCORBIC ACID 500 MG
500 TABLET ORAL DAILY
COMMUNITY

## 2023-06-19 RX ORDER — GUAIFENESIN 600 MG/1
1200 TABLET, EXTENDED RELEASE ORAL 2 TIMES DAILY
COMMUNITY

## 2023-06-19 RX ORDER — ONDANSETRON 4 MG/1
4 TABLET, FILM COATED ORAL EVERY 6 HOURS PRN
COMMUNITY

## 2023-06-19 RX ORDER — PHENOBARBITAL 64.8 MG/1
64.8 TABLET ORAL 2 TIMES DAILY
COMMUNITY

## 2023-06-19 RX ORDER — CALCIUM CARBONATE 750 MG/1
1500 TABLET, CHEWABLE ORAL DAILY
COMMUNITY

## 2023-06-19 RX ORDER — LAMOTRIGINE 25 MG/1
75 TABLET ORAL 2 TIMES DAILY
COMMUNITY

## 2023-06-19 RX ORDER — FLUTICASONE PROPIONATE 50 MCG
1 SPRAY, SUSPENSION (ML) NASAL DAILY
COMMUNITY

## 2023-06-19 RX ORDER — ASPIRIN 81 MG/1
81 TABLET, CHEWABLE ORAL DAILY
COMMUNITY

## 2023-06-19 RX ORDER — BISACODYL 10 MG
10 SUPPOSITORY, RECTAL RECTAL DAILY PRN
COMMUNITY

## 2023-06-19 RX ORDER — ATORVASTATIN CALCIUM 40 MG/1
40 TABLET, FILM COATED ORAL NIGHTLY
COMMUNITY

## 2023-06-20 RX ORDER — LEVETIRACETAM 750 MG/1
1500 TABLET ORAL 2 TIMES DAILY
COMMUNITY

## 2023-07-12 PROBLEM — G40.909 EPILEPSY, UNSPECIFIED, NOT INTRACTABLE, WITHOUT STATUS EPILEPTICUS: Status: RESOLVED | Noted: 2018-07-09 | Resolved: 2023-07-12

## 2023-08-09 ENCOUNTER — NURSING HOME (OUTPATIENT)
Dept: FAMILY MEDICINE CLINIC | Facility: CLINIC | Age: 68
End: 2023-08-09
Payer: MEDICARE

## 2023-08-09 VITALS
SYSTOLIC BLOOD PRESSURE: 137 MMHG | HEART RATE: 74 BPM | DIASTOLIC BLOOD PRESSURE: 68 MMHG | WEIGHT: 173.6 LBS | TEMPERATURE: 98.1 F | BODY MASS INDEX: 31.75 KG/M2 | OXYGEN SATURATION: 98 % | RESPIRATION RATE: 18 BRPM

## 2023-08-09 DIAGNOSIS — M54.41 ACUTE RIGHT-SIDED LOW BACK PAIN WITH RIGHT-SIDED SCIATICA: ICD-10-CM

## 2023-08-09 DIAGNOSIS — Z78.9 IMPAIRED MOBILITY AND ADLS: Chronic | ICD-10-CM

## 2023-08-09 DIAGNOSIS — Z74.09 IMPAIRED MOBILITY AND ADLS: Chronic | ICD-10-CM

## 2023-08-09 NOTE — LETTER
Nursing Home Follow Up Note      Luigi Urena DO []   LATONIA Tony [x]  [address] [place] [address]  Phone: [phone]  Fax: [phone] Marcell Christina MD []    Rm Tran DO []   [address] [place] [address]  Phone: [phone]  Fax: [phone]     PATIENT NAME: Viviane Peñaloza                                                                          YOB: 1955           DATE OF SERVICE: 08/9/2023  FACILITY:  [][facility]   [] [facility]   [x] [facility]   [] [facility]   [] Other ______________________________________________________________________      CHIEF COMPLAINT:    Right lower back pain that radiates down buttock and back of leg for the past couple days.      HISTORY OF PRESENT ILLNESS:     Patient has complaints today of right lower back pain that radiates down her right buttock and the back of her right leg to her knee.  She reports that she has been having this for the past 3 days and 9 of her regular medications or Tylenol has helped any.  She reports the pain is a throbbing and aching pain that becomes sharp with certain range of motion and is constant.  Sometimes certain positions will relieve it some and walking makes it worse.  She has no other complaints today and reports that she is doing well otherwise.  She does appear to be having some distress while ambulating in the del cid.  She does remember having back pain in the past but does not remember it radiating like it is currently.  She has no other concerns at this time.    PAST MEDICAL & SURGICAL HISTORY:   Past Medical History:   Diagnosis Date    Anemia     Anxiety     Ascorbic acid deficiency     Depression     Epilepsy     Fracture of unspecified phalanx of left index finger, initial encounter for closed fracture 01/16/2019    Fracture, clavicle     left     Hyperlipidemia     Hypertension     Need for assistance with personal care     Obesity     Presence of artificial knee joint, left      Presence of right artificial knee joint     Repeated falls     Shoulder fracture, left     Vitamin D deficiency       Past Surgical History:   Procedure Laterality Date    OOPHORECTOMY           MEDICATIONS:  I have reviewed and reconciled the patients medication list in the patients chart at the skilled nursing facility today.      ALLERGIES:    No Known Allergies      SOCIAL HISTORY:    Social History     Socioeconomic History    Marital status: Single   Tobacco Use    Smoking status: Never    Smokeless tobacco: Never   Substance and Sexual Activity    Alcohol use: No    Drug use: No    Sexual activity: Defer       FAMILY HISTORY:    Family History   Problem Relation Age of Onset    Diabetes Mother     Heart disease Mother     Hypertension Mother     Hypertension Father     Diabetes Sister     Hypertension Sister        REVIEW OF SYSTEMS:    Review of Systems   Constitutional:  Negative for activity change, appetite change, chills, diaphoresis, fatigue, fever, unexpected weight gain and unexpected weight loss.   HENT:  Negative for congestion, ear pain, mouth sores, nosebleeds, postnasal drip, sneezing, sore throat and trouble swallowing.    Eyes:  Negative for discharge, redness, itching and visual disturbance.   Respiratory:  Negative for apnea, cough, choking, chest tightness, shortness of breath, wheezing and stridor.    Cardiovascular:  Negative for chest pain, palpitations and leg swelling.   Gastrointestinal:  Negative for abdominal distention, abdominal pain, blood in stool, constipation, diarrhea, nausea, vomiting, GERD and indigestion.   Genitourinary:  Negative for decreased urine volume, difficulty urinating, dysuria, flank pain, frequency, hematuria, urgency and urinary incontinence.   Musculoskeletal:  Positive for arthralgias (chronic) and back pain. Negative for gait problem, joint swelling and myalgias.   Skin:  Negative for color change, dry skin, rash and skin lesions.   Neurological:   Positive for weakness and memory problem. Negative for dizziness, seizures, speech difficulty and confusion.        Sciatica   Psychiatric/Behavioral:  Negative for behavioral problems, dysphoric mood, hallucinations, sleep disturbance and depressed mood. The patient is not nervous/anxious.        PHYSICAL EXAMINATION:   VITAL SIGNS:   Vitals:    08/09/23 1530   BP: 137/68   Pulse: 74   Resp: 18   Temp: 98.1 øF (36.7 øC)   SpO2: 98%   Weight: 78.7 kg (173 lb 9.6 oz)        Physical Exam  Vitals and nursing note reviewed.   Constitutional:       Appearance: She is well-developed.   HENT:      Head: Normocephalic.   Cardiovascular:      Rate and Rhythm: Normal rate and regular rhythm.      Heart sounds: Normal heart sounds.   Pulmonary:      Effort: Pulmonary effort is normal. No respiratory distress.      Breath sounds: Normal breath sounds. No wheezing or rales.   Abdominal:      General: Bowel sounds are normal. There is no distension.      Palpations: Abdomen is soft.      Tenderness: There is no abdominal tenderness.   Musculoskeletal:        Arms:       Cervical back: Normal range of motion.      Comments: LE weakness   Skin:     General: Skin is warm.      Findings: No rash.   Neurological:      Mental Status: She is alert.   Psychiatric:         Behavior: Behavior normal.       RECORDS REVIEW:   I have reviewed records in [facility]    ASSESSMENT     Diagnoses and all orders for this visit:    1. Acute right-sided low back pain with right-sided sciatica    2. Impaired mobility and ADLs        PLAN    Right sided back pain with sciatica  -Toradol 10 mg IM and Depo Medrol 40 mg IM x 1 now. Will follow up with patient next week to ensure improvement.     Nursing to continue supportive care for all ADLs.    Patient was encouraged to keep me informed of any acute changes, lack of improvement, or any new concerning symptoms.  Patient voiced understanding of all instructions and denied further questions.     Will follow up  "and continue to monitor.     [x]  Discussed Patient in detail with nursing/staff, addressed all needs today.     [x]  Plan of Care Reviewed   [x]  PT/OT Reviewed   [x]  Order Changes  [x]  Discharge Plans Reviewed  [x]  Advance Directive on file with Nursing Home.   [x]  POA on file with Nursing Home.   [x]  Code Status listed: [x]  Full Code   []  DNR     "I confirm accuracy of unchanged data/findings which have been carried forward from previous visit, as well as I have updated appropriately those that have changed."        Marlene Garcia, APRN.       "

## 2023-08-10 NOTE — PROGRESS NOTES
Nursing Home Follow Up Note      Luigi Urena DO []   LATONIA Tony [x]  [address] [place] [address]  Phone: [phone]  Fax: [phone] Marcell Christina MD []    Rm Tran DO []   [address] [place] [address]  Phone: [phone]  Fax: [phone]     PATIENT NAME: Viviane Peñaloza                                                                          YOB: 1955           DATE OF SERVICE: 08/9/2023  FACILITY:  [][facility]   [] [place]   [x] [facility]   [] [facility]   [] Other ______________________________________________________________________      CHIEF COMPLAINT:    Right lower back pain that radiates down buttock and back of leg for the past couple days.      HISTORY OF PRESENT ILLNESS:     Patient has complaints today of right lower back pain that radiates down her right buttock and the back of her right leg to her knee.  She reports that she has been having this for the past 3 days and 9 of her regular medications or Tylenol has helped any.  She reports the pain is a throbbing and aching pain that becomes sharp with certain range of motion and is constant.  Sometimes certain positions will relieve it some and walking makes it worse.  She has no other complaints today and reports that she is doing well otherwise.  She does appear to be having some distress while ambulating in the del cid.  She does remember having back pain in the past but does not remember it radiating like it is currently.  She has no other concerns at this time.    PAST MEDICAL & SURGICAL HISTORY:   Past Medical History:   Diagnosis Date    Anemia     Anxiety     Ascorbic acid deficiency     Depression     Epilepsy     Fracture of unspecified phalanx of left index finger, initial encounter for closed fracture 01/16/2019    Fracture, clavicle     left     Hyperlipidemia     Hypertension     Need for assistance with personal care     Obesity     Presence of artificial knee joint, left      Presence of right artificial knee joint     Repeated falls     Shoulder fracture, left     Vitamin D deficiency       Past Surgical History:   Procedure Laterality Date    OOPHORECTOMY           MEDICATIONS:  I have reviewed and reconciled the patients medication list in the patients chart at the skilled nursing facility today.      ALLERGIES:    No Known Allergies      SOCIAL HISTORY:    Social History     Socioeconomic History    Marital status: Single   Tobacco Use    Smoking status: Never    Smokeless tobacco: Never   Substance and Sexual Activity    Alcohol use: No    Drug use: No    Sexual activity: Defer       FAMILY HISTORY:    Family History   Problem Relation Age of Onset    Diabetes Mother     Heart disease Mother     Hypertension Mother     Hypertension Father     Diabetes Sister     Hypertension Sister        REVIEW OF SYSTEMS:    Review of Systems   Constitutional:  Negative for activity change, appetite change, chills, diaphoresis, fatigue, fever, unexpected weight gain and unexpected weight loss.   HENT:  Negative for congestion, ear pain, mouth sores, nosebleeds, postnasal drip, sneezing, sore throat and trouble swallowing.    Eyes:  Negative for discharge, redness, itching and visual disturbance.   Respiratory:  Negative for apnea, cough, choking, chest tightness, shortness of breath, wheezing and stridor.    Cardiovascular:  Negative for chest pain, palpitations and leg swelling.   Gastrointestinal:  Negative for abdominal distention, abdominal pain, blood in stool, constipation, diarrhea, nausea, vomiting, GERD and indigestion.   Genitourinary:  Negative for decreased urine volume, difficulty urinating, dysuria, flank pain, frequency, hematuria, urgency and urinary incontinence.   Musculoskeletal:  Positive for arthralgias (chronic) and back pain. Negative for gait problem, joint swelling and myalgias.   Skin:  Negative for color change, dry skin, rash and skin lesions.   Neurological:   Positive for weakness and memory problem. Negative for dizziness, seizures, speech difficulty and confusion.        Sciatica   Psychiatric/Behavioral:  Negative for behavioral problems, dysphoric mood, hallucinations, sleep disturbance and depressed mood. The patient is not nervous/anxious.        PHYSICAL EXAMINATION:   VITAL SIGNS:   Vitals:    08/09/23 1530   BP: 137/68   Pulse: 74   Resp: 18   Temp: 98.1 øF (36.7 øC)   SpO2: 98%   Weight: 78.7 kg (173 lb 9.6 oz)        Physical Exam  Vitals and nursing note reviewed.   Constitutional:       Appearance: She is well-developed.   HENT:      Head: Normocephalic.   Cardiovascular:      Rate and Rhythm: Normal rate and regular rhythm.      Heart sounds: Normal heart sounds.   Pulmonary:      Effort: Pulmonary effort is normal. No respiratory distress.      Breath sounds: Normal breath sounds. No wheezing or rales.   Abdominal:      General: Bowel sounds are normal. There is no distension.      Palpations: Abdomen is soft.      Tenderness: There is no abdominal tenderness.   Musculoskeletal:        Arms:       Cervical back: Normal range of motion.      Comments: LE weakness   Skin:     General: Skin is warm.      Findings: No rash.   Neurological:      Mental Status: She is alert.   Psychiatric:         Behavior: Behavior normal.       RECORDS REVIEW:   I have reviewed records in [facility]    ASSESSMENT     Diagnoses and all orders for this visit:    1. Acute right-sided low back pain with right-sided sciatica    2. Impaired mobility and ADLs        PLAN    Right sided back pain with sciatica  -Toradol 10 mg IM and Depo Medrol 40 mg IM x 1 now. Will follow up with patient next week to ensure improvement.     Nursing to continue supportive care for all ADLs.    Patient was encouraged to keep me informed of any acute changes, lack of improvement, or any new concerning symptoms.  Patient voiced understanding of all instructions and denied further questions.     Will follow up  "and continue to monitor.     [x]  Discussed Patient in detail with nursing/staff, addressed all needs today.     [x]  Plan of Care Reviewed   [x]  PT/OT Reviewed   [x]  Order Changes  [x]  Discharge Plans Reviewed  [x]  Advance Directive on file with Nursing Home.   [x]  POA on file with Nursing Home.   [x]  Code Status listed: [x]  Full Code   []  DNR     "I confirm accuracy of unchanged data/findings which have been carried forward from previous visit, as well as I have updated appropriately those that have changed."        Marlene Garcia, APRN.     "

## 2023-08-16 ENCOUNTER — NURSING HOME (OUTPATIENT)
Dept: FAMILY MEDICINE CLINIC | Facility: CLINIC | Age: 68
End: 2023-08-16
Payer: MEDICARE

## 2023-08-16 VITALS
OXYGEN SATURATION: 98 % | DIASTOLIC BLOOD PRESSURE: 72 MMHG | RESPIRATION RATE: 18 BRPM | TEMPERATURE: 98.3 F | WEIGHT: 173.6 LBS | BODY MASS INDEX: 31.75 KG/M2 | HEART RATE: 70 BPM | SYSTOLIC BLOOD PRESSURE: 131 MMHG

## 2023-08-16 DIAGNOSIS — M25.562 ARTHRALGIA OF BOTH KNEES: ICD-10-CM

## 2023-08-16 DIAGNOSIS — Z74.09 IMPAIRED MOBILITY AND ADLS: Primary | Chronic | ICD-10-CM

## 2023-08-16 DIAGNOSIS — R29.6 REPEATED FALLS: ICD-10-CM

## 2023-08-16 DIAGNOSIS — G40.309 NONINTRACTABLE GENERALIZED IDIOPATHIC EPILEPSY WITHOUT STATUS EPILEPTICUS: Chronic | ICD-10-CM

## 2023-08-16 DIAGNOSIS — M25.561 ARTHRALGIA OF BOTH KNEES: ICD-10-CM

## 2023-08-16 DIAGNOSIS — Z78.9 IMPAIRED MOBILITY AND ADLS: Primary | Chronic | ICD-10-CM

## 2023-08-16 DIAGNOSIS — M17.0 PRIMARY OSTEOARTHRITIS OF BOTH KNEES: ICD-10-CM

## 2023-08-16 NOTE — PROGRESS NOTES
Nursing Home Progress Note        Luigi Urena DO [x]  LATONIA Tony []  [address] [place] [address]  Phone: [phone]  Fax: [phone] Marcell Christina MD []  Rm Tran DO []  [address] [place] [address]  Phone: [phone]  Fax: [phone]     PATIENT NAME: Viviane Peñaloza                                                                          YOB: 1955           DATE OF SERVICE: 8/16/2023  FACILITY: []  [facility]  [] [facility]  [x]  [facility]  [] [facility]  []  Other ______________________________________________________________________     CHIEF COMPLAINT:   Chronic medical management and long-term care/seizure activity      HISTORY OF PRESENT ILLNESS:   [x]  Follow Up visit for coordination of long term care issues and chronic medical management of Diagnoses and all orders for this visit:    1. Impaired mobility and ADLs (Primary)    2. Nonintractable generalized idiopathic epilepsy without status epilepticus    3. Primary osteoarthritis of both knees    4. Arthralgia of both knees    5. Repeated falls    Patient states her pain has been well controlled.  No reports of any falls or injuries.  She remains receptive to supportive care for mobilization/transfer assistance, as well as ADLs of knee.  No reports of any nutritional/hydration deficits.    No new seizure-like activity reported since last visit.    Vital signs of been stable, blood pressure is at goal.      PAST MEDICAL & SURGICAL HISTORY:   Past Medical History:   Diagnosis Date    Anemia     Anxiety     Ascorbic acid deficiency     Depression     Epilepsy     Fracture of unspecified phalanx of left index finger, initial encounter for closed fracture 01/16/2019    Fracture, clavicle     left     Hyperlipidemia     Hypertension     Need for assistance with personal care     Obesity     Presence of artificial knee joint, left     Presence of right artificial knee joint     Repeated falls      Shoulder fracture, left     Vitamin D deficiency       Past Surgical History:   Procedure Laterality Date    OOPHORECTOMY           MEDICATIONS:  I have reviewed and reconciled the patients medication list in the patients chart at the skilled nursing facility today.      ALLERGIES:    No Known Allergies      SOCIAL HISTORY:    Social History     Socioeconomic History    Marital status: Single   Tobacco Use    Smoking status: Never    Smokeless tobacco: Never   Substance and Sexual Activity    Alcohol use: No    Drug use: No    Sexual activity: Defer       FAMILY HISTORY:    Family History   Problem Relation Age of Onset    Diabetes Mother     Heart disease Mother     Hypertension Mother     Hypertension Father     Diabetes Sister     Hypertension Sister        REVIEW OF SYSTEMS:    Review of Systems  Appetite: Fair []   Good [x]   Poor []   Weight Loss []  [x]  Weight Stable   Unavoidable Weight Loss []  Tolerating Tube Feeding []    Supplements Provided []   Constitutional: Negative for fever, chills, diaphoresis or fatigue and weight change.   HENT: No dysphagia; no changes to vision/hearing/smell/taste; no epistaxis  Eyes: Negative for redness and visual disturbance.   Respiratory: Negative for shortness of breath, chest pain, cough or chest tightness.   Cardiovascular: Negative for chest pain and palpitations.   Gastrointestinal: Negative for abdominal distention, abdominal pain and blood in stool.   Endocrine: Negative for cold intolerance and heat intolerance.   Genitourinary: Negative for difficulty urinating, dysuria and frequency.Negative for hematuria   Musculoskeletal: Chronic myalgias and arthralgias, worse to the left knee but improving.  Integumentary: Postsurgical scarring noted to the left knee.  No drainage or streaking erythema.  Neurological: Negative for syncope, weakness and headaches.   Hematological: Negative for adenopathy. Does not bruise/bleed easily.   Immunological: Negative for reported  allergies or immunological disorders  Psychological: No acute behavioral changes    PHYSICAL EXAMINATION:   VITAL SIGNS:   Vitals:    08/16/23 1323   BP: 131/72   Pulse: 70   Resp: 18   Temp: 98.3 øF (36.8 øC)   SpO2: 98%       Physical Exam      General Appearance:  [x]  Alert   [x]  Oriented x person  [x]  No acute distress     []  Confused  []  Disoriented   []  Comatose   Head:  Atraumatic and normocephalic, without obvious abnormality.   Eyes:         PERRLA, conjunctivae and sclerae normal, no Icterus. No pallor. Extra-occular movements are within normal limits.   Ears:  Ears appear intact with no abnormalities noted.   Throat: No oral lesions, no thrush, oral mucosa moist.   Neck: Supple, trachea midline, no thyromegaly, no carotid bruit.   Back:   No kyphoscoliosis. No tenderness to palpation.   Lungs:   Chest shape is normal. Breath sounds heard bilaterally equally.  No wheezing.    Audible air exchange noted all lung fields.   Heart:  Normal S1 and S2, no murmur, no gallop, no rub. No JVD.   Abdomen:   Normal bowel sounds, no masses, no organomegaly. Soft, non-tender, non-distended, no guarding.  Mild truncal obesity.   Extremities: Moves all extremities; mild gravity dependent edema of bilateral lower extremities, without cyanosis or clubbing.    Poor core strength and stability.   Requires assistance with transfers at times.  Utilizes walker for assistance with ambulation.  Quadriceps mechanism intact.  Johnathan/Nguyen test negative.  Symmetric and appropriate dorsiflexion and plantar flexion of bilateral feet.   Pulses: Pulses palpable and equal bilaterally.   Skin: No bleeding or rash.  Generalized dry skin noted.  Age-related atrophy of skin.  Clean dry surgical incision to the left knee, well approximated edges.   Neurologic: [x] Normal speech []  Normal mental status    [x] Cranial nerves II through XII intact   [x]  No anosmia [x]  DTR 2+ [x]  Proprioception intact  [x]  No focal motor/sensory  deficits      Psych/Mood:                    [x]  No acute changes []  Depressed  Urinary:                            [x]  Continent  []  Incontinent []  Retention  []  F/C      []  UTI w/treatment in progress           ASSESSMENT     Diagnoses and all orders for this visit:    1. Impaired mobility and ADLs (Primary)    2. Nonintractable generalized idiopathic epilepsy without status epilepticus    3. Primary osteoarthritis of both knees    4. Arthralgia of both knees    5. Repeated falls          PLAN  Continue current supportive care for mobilization/transfer assistance, fall precautions in place.  Continue rollator use.  Plan to follow her nutritional/hydration status closely.  I have stressed the importance of maintaining adequate hydration, avoid prolonged fasting periods.    Continue healthy dietary choices.  Continue current antiepileptic treatment regimen.    Vital signs demonstrate hemodynamic stability.  Surveillance labs when needed.      [x]  Discussed Patient in detail with nursing/staff, addressed all needs today.     [x]  Plan of Care Reviewed   []  PT/OT Reviewed   []  Order Changes  []  Discharge Plans Reviewed   []  Code Status Changes     I spent 30 minutes caring for Viviane on this date of service. This time includes time spent by me in the following activities:preparing for the visit, performing a medically appropriate examination and/or evaluation , counseling and educating the patient/family/caregiver, ordering medications, tests, or procedures, documenting information in the medical record, and care coordination    I have reviewed and updated all copied forward information, as appropriate.  I attest to the accuracy and relevance of any unchanged information.    Luigi Urena DO  8/16/2023

## 2023-08-16 NOTE — LETTER
Nursing Home Progress Note        Luigi Urena DO [x]  LATONIA Tony []  [address] [place] [address]  Phone: [phone]  Fax: [phone] Marcell Christina MD []  Rm Tran DO []  [address] [place] [address]  Phone: [phone]  Fax: [phone]     PATIENT NAME: Viviane Peñaloza                                                                          YOB: 1955           DATE OF SERVICE: 8/16/2023  FACILITY: []  [facility]  [] [place]  [x]  [facility]  [] [facility]  []  Other ______________________________________________________________________     CHIEF COMPLAINT:   Chronic medical management and long-term care/seizure activity      HISTORY OF PRESENT ILLNESS:   [x]  Follow Up visit for coordination of long term care issues and chronic medical management of Diagnoses and all orders for this visit:    1. Impaired mobility and ADLs (Primary)    2. Nonintractable generalized idiopathic epilepsy without status epilepticus    3. Primary osteoarthritis of both knees    4. Arthralgia of both knees    5. Repeated falls    Patient states her pain has been well controlled.  No reports of any falls or injuries.  She remains receptive to supportive care for mobilization/transfer assistance, as well as ADLs of knee.  No reports of any nutritional/hydration deficits.    No new seizure-like activity reported since last visit.    Vital signs of been stable, blood pressure is at goal.      PAST MEDICAL & SURGICAL HISTORY:   Past Medical History:   Diagnosis Date    Anemia     Anxiety     Ascorbic acid deficiency     Depression     Epilepsy     Fracture of unspecified phalanx of left index finger, initial encounter for closed fracture 01/16/2019    Fracture, clavicle     left     Hyperlipidemia     Hypertension     Need for assistance with personal care     Obesity     Presence of artificial knee joint, left     Presence of right artificial knee joint     Repeated falls      Shoulder fracture, left     Vitamin D deficiency       Past Surgical History:   Procedure Laterality Date    OOPHORECTOMY           MEDICATIONS:  I have reviewed and reconciled the patients medication list in the patients chart at the skilled nursing facility today.      ALLERGIES:    No Known Allergies      SOCIAL HISTORY:    Social History     Socioeconomic History    Marital status: Single   Tobacco Use    Smoking status: Never    Smokeless tobacco: Never   Substance and Sexual Activity    Alcohol use: No    Drug use: No    Sexual activity: Defer       FAMILY HISTORY:    Family History   Problem Relation Age of Onset    Diabetes Mother     Heart disease Mother     Hypertension Mother     Hypertension Father     Diabetes Sister     Hypertension Sister        REVIEW OF SYSTEMS:    Review of Systems  Appetite: Fair []   Good [x]   Poor []   Weight Loss []  [x]  Weight Stable   Unavoidable Weight Loss []  Tolerating Tube Feeding []    Supplements Provided []   Constitutional: Negative for fever, chills, diaphoresis or fatigue and weight change.   HENT: No dysphagia; no changes to vision/hearing/smell/taste; no epistaxis  Eyes: Negative for redness and visual disturbance.   Respiratory: Negative for shortness of breath, chest pain, cough or chest tightness.   Cardiovascular: Negative for chest pain and palpitations.   Gastrointestinal: Negative for abdominal distention, abdominal pain and blood in stool.   Endocrine: Negative for cold intolerance and heat intolerance.   Genitourinary: Negative for difficulty urinating, dysuria and frequency.Negative for hematuria   Musculoskeletal: Chronic myalgias and arthralgias, worse to the left knee but improving.  Integumentary: Postsurgical scarring noted to the left knee.  No drainage or streaking erythema.  Neurological: Negative for syncope, weakness and headaches.   Hematological: Negative for adenopathy. Does not bruise/bleed easily.   Immunological: Negative for reported  allergies or immunological disorders  Psychological: No acute behavioral changes    PHYSICAL EXAMINATION:   VITAL SIGNS:   Vitals:    08/16/23 1323   BP: 131/72   Pulse: 70   Resp: 18   Temp: 98.3 øF (36.8 øC)   SpO2: 98%       Physical Exam      General Appearance:  [x]  Alert   [x]  Oriented x person  [x]  No acute distress     []  Confused  []  Disoriented   []  Comatose   Head:  Atraumatic and normocephalic, without obvious abnormality.   Eyes:         PERRLA, conjunctivae and sclerae normal, no Icterus. No pallor. Extra-occular movements are within normal limits.   Ears:  Ears appear intact with no abnormalities noted.   Throat: No oral lesions, no thrush, oral mucosa moist.   Neck: Supple, trachea midline, no thyromegaly, no carotid bruit.   Back:   No kyphoscoliosis. No tenderness to palpation.   Lungs:   Chest shape is normal. Breath sounds heard bilaterally equally.  No wheezing.    Audible air exchange noted all lung fields.   Heart:  Normal S1 and S2, no murmur, no gallop, no rub. No JVD.   Abdomen:   Normal bowel sounds, no masses, no organomegaly. Soft, non-tender, non-distended, no guarding.  Mild truncal obesity.   Extremities: Moves all extremities; mild gravity dependent edema of bilateral lower extremities, without cyanosis or clubbing.    Poor core strength and stability.   Requires assistance with transfers at times.  Utilizes walker for assistance with ambulation.  Quadriceps mechanism intact.  Johnathan/Nguyen test negative.  Symmetric and appropriate dorsiflexion and plantar flexion of bilateral feet.   Pulses: Pulses palpable and equal bilaterally.   Skin: No bleeding or rash.  Generalized dry skin noted.  Age-related atrophy of skin.  Clean dry surgical incision to the left knee, well approximated edges.   Neurologic: [x] Normal speech []  Normal mental status    [x] Cranial nerves II through XII intact   [x]  No anosmia [x]  DTR 2+ [x]  Proprioception intact  [x]  No focal motor/sensory  deficits      Psych/Mood:                    [x]  No acute changes []  Depressed  Urinary:                            [x]  Continent  []  Incontinent []  Retention  []  F/C      []  UTI w/treatment in progress           ASSESSMENT     Diagnoses and all orders for this visit:    1. Impaired mobility and ADLs (Primary)    2. Nonintractable generalized idiopathic epilepsy without status epilepticus    3. Primary osteoarthritis of both knees    4. Arthralgia of both knees    5. Repeated falls          PLAN  Continue current supportive care for mobilization/transfer assistance, fall precautions in place.  Continue rollator use.  Plan to follow her nutritional/hydration status closely.  I have stressed the importance of maintaining adequate hydration, avoid prolonged fasting periods.    Continue healthy dietary choices.  Continue current antiepileptic treatment regimen.    Vital signs demonstrate hemodynamic stability.  Surveillance labs when needed.      [x]  Discussed Patient in detail with nursing/staff, addressed all needs today.     [x]  Plan of Care Reviewed   []  PT/OT Reviewed   []  Order Changes  []  Discharge Plans Reviewed   []  Code Status Changes     I spent 30 minutes caring for Viviane on this date of service. This time includes time spent by me in the following activities:preparing for the visit, performing a medically appropriate examination and/or evaluation , counseling and educating the patient/family/caregiver, ordering medications, tests, or procedures, documenting information in the medical record, and care coordination    I have reviewed and updated all copied forward information, as appropriate.  I attest to the accuracy and relevance of any unchanged information.    Luigi Urena DO  8/16/2023

## 2023-08-24 RX ORDER — CLONAZEPAM 0.5 MG/1
0.5 TABLET ORAL 2 TIMES DAILY
Qty: 60 TABLET | Refills: 5 | Status: SHIPPED | OUTPATIENT
Start: 2023-08-24

## 2023-08-24 NOTE — TELEPHONE ENCOUNTER
[facility] REQUESTING MED REFILL FOR CLONAZEPAM 0.5 MG.    DIRECTIONS: CLONAZEPAM 0.5 MG 1 TAB PO BID SCHEDULED.

## 2023-09-18 RX ORDER — PREGABALIN 100 MG/1
100 CAPSULE ORAL 2 TIMES DAILY
Qty: 60 CAPSULE | Refills: 5 | Status: SHIPPED | OUTPATIENT
Start: 2023-09-18

## 2023-10-11 ENCOUNTER — NURSING HOME (OUTPATIENT)
Dept: FAMILY MEDICINE CLINIC | Facility: CLINIC | Age: 68
End: 2023-10-11
Payer: MEDICARE

## 2023-10-11 VITALS
RESPIRATION RATE: 16 BRPM | OXYGEN SATURATION: 99 % | SYSTOLIC BLOOD PRESSURE: 134 MMHG | BODY MASS INDEX: 33.18 KG/M2 | HEART RATE: 80 BPM | DIASTOLIC BLOOD PRESSURE: 78 MMHG | WEIGHT: 181.4 LBS | TEMPERATURE: 98.1 F

## 2023-10-11 DIAGNOSIS — M25.562 ACUTE PAIN OF LEFT KNEE: ICD-10-CM

## 2023-10-11 DIAGNOSIS — Z78.9 IMPAIRED MOBILITY AND ADLS: Chronic | ICD-10-CM

## 2023-10-11 DIAGNOSIS — Z96.652 HISTORY OF KNEE REPLACEMENT PROCEDURE OF LEFT KNEE: ICD-10-CM

## 2023-10-11 DIAGNOSIS — Z74.09 IMPAIRED MOBILITY AND ADLS: Chronic | ICD-10-CM

## 2023-10-11 NOTE — LETTER
"Nursing Home Follow Up Note      Luigi Urena DO []   LATONIA Tony [x]  [address] [place] [address]  Phone: [phone]  Fax: [phone] Marcell Christina MD []    Rm Tran DO []   [address] [place] [address]  Phone: [phone]  Fax: [phone]     PATIENT NAME: Viviane Peñaloza                                                                          YOB: 1955           DATE OF SERVICE: 10/11/2023  FACILITY:  [][facility]   [] [place]   [x] [facility]   [] [facility]   [] Other ______________________________________________________________________      CHIEF COMPLAINT:    Complaints of discomfort and instability of left knee for the past couple days.       HISTORY OF PRESENT ILLNESS:     Patient has complaints today left knee feeling like it is \"going to give out\", for the past few days. She reports that she will be ambulating with her walker and all of the sudden it feel like her knee \"is going to buckle\". She reports there is some discomfort when this happens, but no pain. She has felt like she would have fallen a couple time if not for her walker or something catching her. No known injury. Has not been working with therapy or doing any special exercises. No other complaints today. No pain with palpation. Ambulating in del cid with walker. No signs and symptoms of any distress.       PAST MEDICAL & SURGICAL HISTORY:   Past Medical History:   Diagnosis Date    Anemia     Anxiety     Ascorbic acid deficiency     Depression     Epilepsy     Fracture of unspecified phalanx of left index finger, initial encounter for closed fracture 01/16/2019    Fracture, clavicle     left     Hyperlipidemia     Hypertension     Need for assistance with personal care     Obesity     Presence of artificial knee joint, left     Presence of right artificial knee joint     Repeated falls     Shoulder fracture, left     Vitamin D deficiency       Past Surgical History:   Procedure " Laterality Date    OOPHORECTOMY           MEDICATIONS:  I have reviewed and reconciled the patients medication list in the patients chart at the skilled nursing facility today.      ALLERGIES:    No Known Allergies      SOCIAL HISTORY:    Social History     Socioeconomic History    Marital status: Single   Tobacco Use    Smoking status: Never    Smokeless tobacco: Never   Substance and Sexual Activity    Alcohol use: No    Drug use: No    Sexual activity: Defer       FAMILY HISTORY:    Family History   Problem Relation Age of Onset    Diabetes Mother     Heart disease Mother     Hypertension Mother     Hypertension Father     Diabetes Sister     Hypertension Sister        REVIEW OF SYSTEMS:    Review of Systems   Constitutional:  Negative for activity change, appetite change, chills, diaphoresis, fatigue, fever, unexpected weight gain and unexpected weight loss.   Respiratory:  Negative for apnea, cough, chest tightness, shortness of breath, wheezing and stridor.    Cardiovascular:  Negative for chest pain, palpitations and leg swelling.   Gastrointestinal:  Negative for abdominal pain, blood in stool, constipation, diarrhea, nausea and vomiting.   Musculoskeletal:  Positive for arthralgias (left knee) and gait problem. Negative for back pain and myalgias.   Skin:  Negative for color change and rash.   Neurological:  Positive for weakness (left knee) and memory problem. Negative for dizziness, seizures, speech difficulty and confusion.   Psychiatric/Behavioral:  Negative for behavioral problems, dysphoric mood, sleep disturbance and depressed mood. The patient is not nervous/anxious.          PHYSICAL EXAMINATION:   VITAL SIGNS:   Vitals:    10/11/23 1609   BP: 134/78   Pulse: 80   Resp: 16   Temp: 98.1 øF (36.7 øC)   SpO2: 99%   Weight: 82.3 kg (181 lb 6.4 oz)        Physical Exam  Vitals and nursing note reviewed.   Constitutional:       Appearance: She is well-developed.   Cardiovascular:      Rate and Rhythm:  "Normal rate and regular rhythm.      Heart sounds: Normal heart sounds.   Pulmonary:      Effort: Pulmonary effort is normal. No respiratory distress.      Breath sounds: Normal breath sounds. No wheezing or rales.   Abdominal:      General: Bowel sounds are normal. There is no distension.      Palpations: Abdomen is soft.      Tenderness: There is no abdominal tenderness.   Musculoskeletal:      Left knee: Decreased range of motion. No tenderness.      Comments: LE weakness   Skin:     General: Skin is warm.      Findings: No rash.   Neurological:      Mental Status: She is alert.   Psychiatric:         Mood and Affect: Mood and affect normal.         Speech: Speech normal.         Behavior: Behavior normal.         Cognition and Memory: Cognition and memory normal.         RECORDS REVIEW:   I have reviewed records in [facility]    ASSESSMENT     Diagnoses and all orders for this visit:    1. Acute pain of left knee    2. History of knee replacement procedure of left knee    3. Impaired mobility and ADLs          PLAN    Left knee pain  -will obtain xray. Referral placed for her Ortho for follow up. Will consult PT for further evaluation as well. She does have pain medication which does help. Will follow up with results.     Nursing to continue supportive care for all ADLs.    Patient was encouraged to keep me informed of any acute changes, lack of improvement, or any new concerning symptoms.  Patient voiced understanding of all instructions and denied further questions.     Will follow up and continue to monitor.     [x]  Discussed Patient in detail with nursing/staff, addressed all needs today.     [x]  Plan of Care Reviewed   [x]  PT/OT Reviewed   [x]  Order Changes  [x]  Discharge Plans Reviewed  [x]  Advance Directive on file with Nursing Home.   [x]  POA on file with Nursing Home.   [x]  Code Status listed: [x]  Full Code   []  DNR     "I confirm accuracy of unchanged data/findings which have been carried forward " "from previous visit, as well as I have updated appropriately those that have changed."       Marlene Garcia, APRN.     "

## 2023-10-12 NOTE — PROGRESS NOTES
"Nursing Home Follow Up Note      Luigi Urena DO []   LATONIA Tony [x]  [address] [place] [address]  Phone: [phone]  Fax: [phone] Marcell Christina MD []    Rm Tran DO []   [address] [place] [address]  Phone: [phone]  Fax: [phone]     PATIENT NAME: Viviane Peñaloza                                                                          YOB: 1955           DATE OF SERVICE: 10/11/2023  FACILITY:  [][place]   [] [place]   [x] [facility]   [] [facility]   [] Other ______________________________________________________________________      CHIEF COMPLAINT:    Complaints of discomfort and instability of left knee for the past couple days.       HISTORY OF PRESENT ILLNESS:     Patient has complaints today left knee feeling like it is \"going to give out\", for the past few days. She reports that she will be ambulating with her walker and all of the sudden it feel like her knee \"is going to buckle\". She reports there is some discomfort when this happens, but no pain. She has felt like she would have fallen a couple time if not for her walker or something catching her. No known injury. Has not been working with therapy or doing any special exercises. No other complaints today. No pain with palpation. Ambulating in del cid with walker. No signs and symptoms of any distress.       PAST MEDICAL & SURGICAL HISTORY:   Past Medical History:   Diagnosis Date    Anemia     Anxiety     Ascorbic acid deficiency     Depression     Epilepsy     Fracture of unspecified phalanx of left index finger, initial encounter for closed fracture 01/16/2019    Fracture, clavicle     left     Hyperlipidemia     Hypertension     Need for assistance with personal care     Obesity     Presence of artificial knee joint, left     Presence of right artificial knee joint     Repeated falls     Shoulder fracture, left     Vitamin D deficiency       Past Surgical History:   Procedure " Laterality Date    OOPHORECTOMY           MEDICATIONS:  I have reviewed and reconciled the patients medication list in the patients chart at the skilled nursing facility today.      ALLERGIES:    No Known Allergies      SOCIAL HISTORY:    Social History     Socioeconomic History    Marital status: Single   Tobacco Use    Smoking status: Never    Smokeless tobacco: Never   Substance and Sexual Activity    Alcohol use: No    Drug use: No    Sexual activity: Defer       FAMILY HISTORY:    Family History   Problem Relation Age of Onset    Diabetes Mother     Heart disease Mother     Hypertension Mother     Hypertension Father     Diabetes Sister     Hypertension Sister        REVIEW OF SYSTEMS:    Review of Systems   Constitutional:  Negative for activity change, appetite change, chills, diaphoresis, fatigue, fever, unexpected weight gain and unexpected weight loss.   Respiratory:  Negative for apnea, cough, chest tightness, shortness of breath, wheezing and stridor.    Cardiovascular:  Negative for chest pain, palpitations and leg swelling.   Gastrointestinal:  Negative for abdominal pain, blood in stool, constipation, diarrhea, nausea and vomiting.   Musculoskeletal:  Positive for arthralgias (left knee) and gait problem. Negative for back pain and myalgias.   Skin:  Negative for color change and rash.   Neurological:  Positive for weakness (left knee) and memory problem. Negative for dizziness, seizures, speech difficulty and confusion.   Psychiatric/Behavioral:  Negative for behavioral problems, dysphoric mood, sleep disturbance and depressed mood. The patient is not nervous/anxious.          PHYSICAL EXAMINATION:   VITAL SIGNS:   Vitals:    10/11/23 1609   BP: 134/78   Pulse: 80   Resp: 16   Temp: 98.1 øF (36.7 øC)   SpO2: 99%   Weight: 82.3 kg (181 lb 6.4 oz)        Physical Exam  Vitals and nursing note reviewed.   Constitutional:       Appearance: She is well-developed.   Cardiovascular:      Rate and Rhythm:  "Normal rate and regular rhythm.      Heart sounds: Normal heart sounds.   Pulmonary:      Effort: Pulmonary effort is normal. No respiratory distress.      Breath sounds: Normal breath sounds. No wheezing or rales.   Abdominal:      General: Bowel sounds are normal. There is no distension.      Palpations: Abdomen is soft.      Tenderness: There is no abdominal tenderness.   Musculoskeletal:      Left knee: Decreased range of motion. No tenderness.      Comments: LE weakness   Skin:     General: Skin is warm.      Findings: No rash.   Neurological:      Mental Status: She is alert.   Psychiatric:         Mood and Affect: Mood and affect normal.         Speech: Speech normal.         Behavior: Behavior normal.         Cognition and Memory: Cognition and memory normal.         RECORDS REVIEW:   I have reviewed records in [facility]    ASSESSMENT     Diagnoses and all orders for this visit:    1. Acute pain of left knee    2. History of knee replacement procedure of left knee    3. Impaired mobility and ADLs          PLAN    Left knee pain  -will obtain xray. Referral placed for her Ortho for follow up. Will consult PT for further evaluation as well. She does have pain medication which does help. Will follow up with results.     Nursing to continue supportive care for all ADLs.    Patient was encouraged to keep me informed of any acute changes, lack of improvement, or any new concerning symptoms.  Patient voiced understanding of all instructions and denied further questions.     Will follow up and continue to monitor.     [x]  Discussed Patient in detail with nursing/staff, addressed all needs today.     [x]  Plan of Care Reviewed   [x]  PT/OT Reviewed   [x]  Order Changes  [x]  Discharge Plans Reviewed  [x]  Advance Directive on file with Nursing Home.   [x]  POA on file with Nursing Home.   [x]  Code Status listed: [x]  Full Code   []  DNR     "I confirm accuracy of unchanged data/findings which have been carried forward " "from previous visit, as well as I have updated appropriately those that have changed."       Marlene Garcia, APRN.     "

## 2023-10-18 ENCOUNTER — NURSING HOME (OUTPATIENT)
Dept: FAMILY MEDICINE CLINIC | Facility: CLINIC | Age: 68
End: 2023-10-18
Payer: MEDICARE

## 2023-10-18 VITALS
DIASTOLIC BLOOD PRESSURE: 70 MMHG | RESPIRATION RATE: 18 BRPM | HEART RATE: 80 BPM | OXYGEN SATURATION: 95 % | BODY MASS INDEX: 33.18 KG/M2 | TEMPERATURE: 97.6 F | SYSTOLIC BLOOD PRESSURE: 126 MMHG | WEIGHT: 181.4 LBS

## 2023-10-18 DIAGNOSIS — Z74.09 IMPAIRED MOBILITY AND ADLS: Primary | Chronic | ICD-10-CM

## 2023-10-18 DIAGNOSIS — G40.309 NONINTRACTABLE GENERALIZED IDIOPATHIC EPILEPSY WITHOUT STATUS EPILEPTICUS: Chronic | ICD-10-CM

## 2023-10-18 DIAGNOSIS — Z78.9 IMPAIRED MOBILITY AND ADLS: Primary | Chronic | ICD-10-CM

## 2023-10-18 DIAGNOSIS — M17.0 PRIMARY OSTEOARTHRITIS OF BOTH KNEES: ICD-10-CM

## 2023-10-18 DIAGNOSIS — M25.561 ARTHRALGIA OF BOTH KNEES: ICD-10-CM

## 2023-10-18 DIAGNOSIS — M25.562 ARTHRALGIA OF BOTH KNEES: ICD-10-CM

## 2023-10-18 NOTE — LETTER
Nursing Home Progress Note        Luigi Urena DO [x]  LATONIA Tony []  [address] [place] [address]  Phone: [phone]  Fax: [phone] Marcell Christina MD []  Rm Tran DO []  [address] [place] [address]  Phone: [phone]  Fax: [phone]     PATIENT NAME: Viviane Peñaloza                                                                          YOB: 1955           DATE OF SERVICE: 10/18/2023  FACILITY: []  [facility]  [] [facility]  [x]  [facility]  [] [facility]  []  Other ______________________________________________________________________     CHIEF COMPLAINT:   Chronic medical management and long-term care/seizure activity      HISTORY OF PRESENT ILLNESS:   [x]  Follow Up visit for coordination of long term care issues and chronic medical management of Diagnoses and all orders for this visit:    1. Impaired mobility and ADLs (Primary)    2. Nonintractable generalized idiopathic epilepsy without status epilepticus    3. Primary osteoarthritis of both knees    4. Arthralgia of both knees    Patient remains pleasant and interactive during questioning/examination.  She remains receptive to supportive care for mobilization/transfer assistance.    She does complain of some left forefoot pain, chronically high arching foot.  She also describes some situations where her left knee feels as though it is unstable.  She denies any new falls or injuries.    Denies any seizure-like activity since last visit.    Vital signs have been stable.      PAST MEDICAL & SURGICAL HISTORY:   Past Medical History:   Diagnosis Date    Anemia     Anxiety     Ascorbic acid deficiency     Depression     Epilepsy     Fracture of unspecified phalanx of left index finger, initial encounter for closed fracture 01/16/2019    Fracture, clavicle     left     Hyperlipidemia     Hypertension     Need for assistance with personal care     Obesity     Presence of artificial knee joint, left      Presence of right artificial knee joint     Repeated falls     Shoulder fracture, left     Vitamin D deficiency       Past Surgical History:   Procedure Laterality Date    OOPHORECTOMY           MEDICATIONS:  I have reviewed and reconciled the patients medication list in the patients chart at the skilled nursing facility today.      ALLERGIES:    No Known Allergies      SOCIAL HISTORY:    Social History     Socioeconomic History    Marital status: Single   Tobacco Use    Smoking status: Never    Smokeless tobacco: Never   Substance and Sexual Activity    Alcohol use: No    Drug use: No    Sexual activity: Defer       FAMILY HISTORY:    Family History   Problem Relation Age of Onset    Diabetes Mother     Heart disease Mother     Hypertension Mother     Hypertension Father     Diabetes Sister     Hypertension Sister        REVIEW OF SYSTEMS:    Review of Systems  Appetite: Fair []   Good [x]   Poor []   Weight Loss []  [x]  Weight Stable   Unavoidable Weight Loss []  Tolerating Tube Feeding []    Supplements Provided []   Constitutional: Negative for fever, chills, diaphoresis or fatigue and weight change.   HENT: No dysphagia; no changes to vision/hearing/smell/taste; no epistaxis  Eyes: Negative for redness and visual disturbance.   Respiratory: Negative for shortness of breath, chest pain, cough or chest tightness.   Cardiovascular: Negative for chest pain and palpitations.   Gastrointestinal: Negative for abdominal distention, abdominal pain and blood in stool.   Endocrine: Negative for cold intolerance and heat intolerance.   Genitourinary: Negative for difficulty urinating, dysuria and frequency.Negative for hematuria   Musculoskeletal: Chronic myalgias and arthralgias, worse to the left knee but improving.  Left foot as per above.  Integumentary: Postsurgical scarring noted to the left knee.  No drainage or streaking erythema.  Neurological: Negative for syncope, weakness and headaches.   Hematological:  Negative for adenopathy. Does not bruise/bleed easily.   Immunological: Negative for reported allergies or immunological disorders  Psychological: No acute behavioral changes    PHYSICAL EXAMINATION:   VITAL SIGNS:   Vitals:    10/18/23 0849   BP: 126/70   Pulse: 80   Resp: 18   Temp: 97.6 °F (36.4 °C)   SpO2: 95%       Physical Exam      General Appearance:  [x]  Alert   [x]  Oriented x person  [x]  No acute distress     []  Confused  []  Disoriented   []  Comatose   Head:  Atraumatic and normocephalic, without obvious abnormality.   Eyes:         PERRLA, conjunctivae and sclerae normal, no Icterus. No pallor. Extra-occular movements are within normal limits.   Ears:  Ears appear intact with no abnormalities noted.   Throat: No oral lesions, no thrush, oral mucosa moist.   Neck: Supple, trachea midline, no thyromegaly, no carotid bruit.   Back:   No kyphoscoliosis. No tenderness to palpation.   Lungs:   Chest shape is normal. Breath sounds heard bilaterally equally.  No wheezing.    Audible air exchange noted all lung fields.   Heart:  Normal S1 and S2, no murmur, no gallop, no rub. No JVD.   Abdomen:   Normal bowel sounds, no masses, no organomegaly. Soft, non-tender, non-distended, no guarding.  Mild truncal obesity.   Extremities: Moves all extremities; mild gravity dependent edema of bilateral lower extremities, without cyanosis or clubbing.    Poor core strength and stability.   Requires assistance with transfers at times.  Utilizes walker for assistance with ambulation.  Quadriceps mechanism intact.  Johnathan/Nguyen test negative.  Symmetric and appropriate dorsiflexion and plantar flexion of bilateral feet.  Noted high arch to left foot.  Johnathan/Nguyen sign negative bilaterally.  Normal dorsiflexion/plantarflexion of bilateral feet.   Pulses: Pulses palpable and equal bilaterally.   Skin: No bleeding or rash.  Generalized dry skin noted.  Age-related atrophy of skin.  Clean dry surgical incision to the left  knee, well approximated edges.   Neurologic: [x] Normal speech []  Normal mental status    [x] Cranial nerves II through XII intact   [x]  No anosmia [x]  DTR 2+ [x]  Proprioception intact  [x]  No focal motor/sensory deficits      Psych/Mood:                    [x]  No acute changes []  Depressed  Urinary:                            [x]  Continent  []  Incontinent []  Retention  []  F/C      []  UTI w/treatment in progress           ASSESSMENT     Diagnoses and all orders for this visit:    1. Impaired mobility and ADLs (Primary)    2. Nonintractable generalized idiopathic epilepsy without status epilepticus    3. Primary osteoarthritis of both knees    4. Arthralgia of both knees          PLAN  Continue supportive care for mobilization/transfer assistance, fall precautions in place.  Continue use of walker to assist with ADLs and mobilization.    Pain appears clinically well controlled.  Continue quadricep strengthening exercises to bilateral knees.  Recommended heel wedges with use for weightbearing activities.    Continue antiepileptic treatment regimen.    Vital signs demonstrate hemodynamic stability.    Surveillance labs when needed.        [x]  Discussed Patient in detail with nursing/staff, addressed all needs today.     [x]  Plan of Care Reviewed   []  PT/OT Reviewed   []  Order Changes  []  Discharge Plans Reviewed   []  Code Status Changes     I spent 30 minutes caring for Viviane on this date of service. This time includes time spent by me in the following activities:preparing for the visit, performing a medically appropriate examination and/or evaluation , counseling and educating the patient/family/caregiver, ordering medications, tests, or procedures, documenting information in the medical record, and care coordination    I have reviewed and updated all copied forward information, as appropriate.  I attest to the accuracy and relevance of any unchanged information.    Luigi Urena  DO  10/18/2023

## 2023-10-20 NOTE — PROGRESS NOTES
Nursing Home Progress Note        Luigi Urena DO [x]  LATONIA Tony []  [address] [place] [address]  Phone: [phone]  Fax: [phone] Marcell Christina MD []  Rm Tran DO []  [address] [place] [address]  Phone: [phone]  Fax: [phone]     PATIENT NAME: Viviane Peñaloza                                                                          YOB: 1955           DATE OF SERVICE: 10/18/2023  FACILITY: []  [facility]  [] [facility]  [x]  [facility]  [] [facility]  []  Other ______________________________________________________________________     CHIEF COMPLAINT:   Chronic medical management and long-term care/seizure activity      HISTORY OF PRESENT ILLNESS:   [x]  Follow Up visit for coordination of long term care issues and chronic medical management of Diagnoses and all orders for this visit:    1. Impaired mobility and ADLs (Primary)    2. Nonintractable generalized idiopathic epilepsy without status epilepticus    3. Primary osteoarthritis of both knees    4. Arthralgia of both knees    Patient remains pleasant and interactive during questioning/examination.  She remains receptive to supportive care for mobilization/transfer assistance.    She does complain of some left forefoot pain, chronically high arching foot.  She also describes some situations where her left knee feels as though it is unstable.  She denies any new falls or injuries.    Denies any seizure-like activity since last visit.    Vital signs have been stable.      PAST MEDICAL & SURGICAL HISTORY:   Past Medical History:   Diagnosis Date    Anemia     Anxiety     Ascorbic acid deficiency     Depression     Epilepsy     Fracture of unspecified phalanx of left index finger, initial encounter for closed fracture 01/16/2019    Fracture, clavicle     left     Hyperlipidemia     Hypertension     Need for assistance with personal care     Obesity     Presence of artificial knee joint, left      Presence of right artificial knee joint     Repeated falls     Shoulder fracture, left     Vitamin D deficiency       Past Surgical History:   Procedure Laterality Date    OOPHORECTOMY           MEDICATIONS:  I have reviewed and reconciled the patients medication list in the patients chart at the skilled nursing facility today.      ALLERGIES:    No Known Allergies      SOCIAL HISTORY:    Social History     Socioeconomic History    Marital status: Single   Tobacco Use    Smoking status: Never    Smokeless tobacco: Never   Substance and Sexual Activity    Alcohol use: No    Drug use: No    Sexual activity: Defer       FAMILY HISTORY:    Family History   Problem Relation Age of Onset    Diabetes Mother     Heart disease Mother     Hypertension Mother     Hypertension Father     Diabetes Sister     Hypertension Sister        REVIEW OF SYSTEMS:    Review of Systems  Appetite: Fair []   Good [x]   Poor []   Weight Loss []  [x]  Weight Stable   Unavoidable Weight Loss []  Tolerating Tube Feeding []    Supplements Provided []   Constitutional: Negative for fever, chills, diaphoresis or fatigue and weight change.   HENT: No dysphagia; no changes to vision/hearing/smell/taste; no epistaxis  Eyes: Negative for redness and visual disturbance.   Respiratory: Negative for shortness of breath, chest pain, cough or chest tightness.   Cardiovascular: Negative for chest pain and palpitations.   Gastrointestinal: Negative for abdominal distention, abdominal pain and blood in stool.   Endocrine: Negative for cold intolerance and heat intolerance.   Genitourinary: Negative for difficulty urinating, dysuria and frequency.Negative for hematuria   Musculoskeletal: Chronic myalgias and arthralgias, worse to the left knee but improving.  Left foot as per above.  Integumentary: Postsurgical scarring noted to the left knee.  No drainage or streaking erythema.  Neurological: Negative for syncope, weakness and headaches.   Hematological:  Negative for adenopathy. Does not bruise/bleed easily.   Immunological: Negative for reported allergies or immunological disorders  Psychological: No acute behavioral changes    PHYSICAL EXAMINATION:   VITAL SIGNS:   Vitals:    10/18/23 0849   BP: 126/70   Pulse: 80   Resp: 18   Temp: 97.6 °F (36.4 °C)   SpO2: 95%       Physical Exam      General Appearance:  [x]  Alert   [x]  Oriented x person  [x]  No acute distress     []  Confused  []  Disoriented   []  Comatose   Head:  Atraumatic and normocephalic, without obvious abnormality.   Eyes:         PERRLA, conjunctivae and sclerae normal, no Icterus. No pallor. Extra-occular movements are within normal limits.   Ears:  Ears appear intact with no abnormalities noted.   Throat: No oral lesions, no thrush, oral mucosa moist.   Neck: Supple, trachea midline, no thyromegaly, no carotid bruit.   Back:   No kyphoscoliosis. No tenderness to palpation.   Lungs:   Chest shape is normal. Breath sounds heard bilaterally equally.  No wheezing.    Audible air exchange noted all lung fields.   Heart:  Normal S1 and S2, no murmur, no gallop, no rub. No JVD.   Abdomen:   Normal bowel sounds, no masses, no organomegaly. Soft, non-tender, non-distended, no guarding.  Mild truncal obesity.   Extremities: Moves all extremities; mild gravity dependent edema of bilateral lower extremities, without cyanosis or clubbing.    Poor core strength and stability.   Requires assistance with transfers at times.  Utilizes walker for assistance with ambulation.  Quadriceps mechanism intact.  Johnathna/Nguyen test negative.  Symmetric and appropriate dorsiflexion and plantar flexion of bilateral feet.  Noted high arch to left foot.  Johnathan/Nguyen sign negative bilaterally.  Normal dorsiflexion/plantarflexion of bilateral feet.   Pulses: Pulses palpable and equal bilaterally.   Skin: No bleeding or rash.  Generalized dry skin noted.  Age-related atrophy of skin.  Clean dry surgical incision to the left  knee, well approximated edges.   Neurologic: [x] Normal speech []  Normal mental status    [x] Cranial nerves II through XII intact   [x]  No anosmia [x]  DTR 2+ [x]  Proprioception intact  [x]  No focal motor/sensory deficits      Psych/Mood:                    [x]  No acute changes []  Depressed  Urinary:                            [x]  Continent  []  Incontinent []  Retention  []  F/C      []  UTI w/treatment in progress           ASSESSMENT     Diagnoses and all orders for this visit:    1. Impaired mobility and ADLs (Primary)    2. Nonintractable generalized idiopathic epilepsy without status epilepticus    3. Primary osteoarthritis of both knees    4. Arthralgia of both knees          PLAN  Continue supportive care for mobilization/transfer assistance, fall precautions in place.  Continue use of walker to assist with ADLs and mobilization.    Pain appears clinically well controlled.  Continue quadricep strengthening exercises to bilateral knees.  Recommended heel wedges with use for weightbearing activities.    Continue antiepileptic treatment regimen.    Vital signs demonstrate hemodynamic stability.    Surveillance labs when needed.        [x]  Discussed Patient in detail with nursing/staff, addressed all needs today.     [x]  Plan of Care Reviewed   []  PT/OT Reviewed   []  Order Changes  []  Discharge Plans Reviewed   []  Code Status Changes     I spent 30 minutes caring for Viviane on this date of service. This time includes time spent by me in the following activities:preparing for the visit, performing a medically appropriate examination and/or evaluation , counseling and educating the patient/family/caregiver, ordering medications, tests, or procedures, documenting information in the medical record, and care coordination    I have reviewed and updated all copied forward information, as appropriate.  I attest to the accuracy and relevance of any unchanged information.    Luigi Urena  DO  10/18/2023

## 2023-11-15 ENCOUNTER — NURSING HOME (OUTPATIENT)
Dept: FAMILY MEDICINE CLINIC | Facility: CLINIC | Age: 68
End: 2023-11-15
Payer: MEDICARE

## 2023-11-15 VITALS
SYSTOLIC BLOOD PRESSURE: 136 MMHG | WEIGHT: 184.4 LBS | TEMPERATURE: 98.2 F | DIASTOLIC BLOOD PRESSURE: 74 MMHG | RESPIRATION RATE: 16 BRPM | OXYGEN SATURATION: 98 % | HEART RATE: 68 BPM | BODY MASS INDEX: 33.73 KG/M2

## 2023-11-15 DIAGNOSIS — M54.41 ACUTE RIGHT-SIDED LOW BACK PAIN WITH RIGHT-SIDED SCIATICA: Primary | ICD-10-CM

## 2023-11-15 DIAGNOSIS — M17.0 PRIMARY OSTEOARTHRITIS OF BOTH KNEES: ICD-10-CM

## 2023-11-15 DIAGNOSIS — Z74.09 IMPAIRED MOBILITY AND ADLS: Chronic | ICD-10-CM

## 2023-11-15 DIAGNOSIS — Z78.9 IMPAIRED MOBILITY AND ADLS: Chronic | ICD-10-CM

## 2023-11-15 NOTE — PROGRESS NOTES
Nursing Home Follow Up Note      Luigi Urena DO []   LATONIA Tony [x]  [address] [place] [address]  Phone: [phone]  Fax: [phone] Marcell Christina MD []    Rm Tran DO []   [address] [place] [address]  Phone: [phone]  Fax: [phone]     PATIENT NAME: Viviane Peñaloza                                                                          YOB: 1955           DATE OF SERVICE: 11/15/2023  FACILITY:  [][facility]   [] [facility]   [x] [facility]   [] [facility]   [] Other ______________________________________________________________________      CHIEF COMPLAINT:    Right lower back pain that radiates down buttock and back of leg for the past couple days.      HISTORY OF PRESENT ILLNESS:     Patient has complaints today of right lower back pain that radiates down her right buttock and the back of her right leg to her knee.  She reports that she has been having this for the past 3 days and none of her regular medications or Tylenol have helped any.  She reports the pain is a throbbing and aching pain that becomes sharp with certain range of motion and is constant.  Sometimes certain positions will relieve it some and walking makes it worse.  Reports that she has been having some more knee pain recently and has been walking with an altered gait.  She has no other complaints today and reports that she is doing well otherwise.  She did have the symptoms about 3 months ago.  She has no other concerns at this time.    PAST MEDICAL & SURGICAL HISTORY:   Past Medical History:   Diagnosis Date    Anemia     Anxiety     Ascorbic acid deficiency     Depression     Epilepsy     Fracture of unspecified phalanx of left index finger, initial encounter for closed fracture 01/16/2019    Fracture, clavicle     left     Hyperlipidemia     Hypertension     Need for assistance with personal care     Obesity     Presence of artificial knee joint, left     Presence of right  artificial knee joint     Repeated falls     Shoulder fracture, left     Vitamin D deficiency       Past Surgical History:   Procedure Laterality Date    OOPHORECTOMY           MEDICATIONS:  I have reviewed and reconciled the patients medication list in the patients chart at the skilled nursing facility today.      ALLERGIES:    No Known Allergies      SOCIAL HISTORY:    Social History     Socioeconomic History    Marital status: Single   Tobacco Use    Smoking status: Never    Smokeless tobacco: Never   Substance and Sexual Activity    Alcohol use: No    Drug use: No    Sexual activity: Defer       FAMILY HISTORY:    Family History   Problem Relation Age of Onset    Diabetes Mother     Heart disease Mother     Hypertension Mother     Hypertension Father     Diabetes Sister     Hypertension Sister        REVIEW OF SYSTEMS:    Review of Systems   Constitutional:  Negative for activity change, appetite change, chills, diaphoresis, fatigue, fever, unexpected weight gain and unexpected weight loss.   HENT:  Negative for congestion, ear pain, mouth sores, nosebleeds, postnasal drip, sneezing, sore throat and trouble swallowing.    Eyes:  Negative for discharge, redness, itching and visual disturbance.   Respiratory:  Negative for apnea, cough, choking, chest tightness, shortness of breath, wheezing and stridor.    Cardiovascular:  Negative for chest pain, palpitations and leg swelling.   Gastrointestinal:  Negative for abdominal distention, abdominal pain, blood in stool, constipation, diarrhea, nausea, vomiting, GERD and indigestion.   Genitourinary:  Negative for decreased urine volume, difficulty urinating, dysuria, flank pain, frequency, hematuria, urgency and urinary incontinence.   Musculoskeletal:  Positive for arthralgias (chronic) and back pain. Negative for gait problem, joint swelling and myalgias.   Skin:  Negative for color change, dry skin, rash and skin lesions.   Neurological:  Positive for weakness and  memory problem. Negative for dizziness, seizures, speech difficulty and confusion.        Sciatica   Psychiatric/Behavioral:  Negative for behavioral problems, dysphoric mood, hallucinations, sleep disturbance and depressed mood. The patient is not nervous/anxious.          PHYSICAL EXAMINATION:   VITAL SIGNS:   Vitals:    11/15/23 1457   BP: 136/74   Pulse: 68   Resp: 16   Temp: 98.2 °F (36.8 °C)   SpO2: 98%   Weight: 83.6 kg (184 lb 6.4 oz)        Physical Exam  Vitals and nursing note reviewed.   Constitutional:       Appearance: She is well-developed.   Cardiovascular:      Rate and Rhythm: Normal rate.   Pulmonary:      Effort: Pulmonary effort is normal. No respiratory distress.   Musculoskeletal:        Arms:       Comments: LE weakness   Skin:     Findings: No rash.   Neurological:      General: No focal deficit present.      Mental Status: She is alert and oriented to person, place, and time.   Psychiatric:         Mood and Affect: Mood normal.         Behavior: Behavior normal.         Cognition and Memory: Cognition and memory normal.         RECORDS REVIEW:   I have reviewed [facility]    ASSESSMENT     Diagnoses and all orders for this visit:    1. Acute right-sided low back pain with right-sided sciatica (Primary)    2. Primary osteoarthritis of both knees    3. Impaired mobility and ADLs          PLAN    Right sided back pain with sciatica/OA bilateral knees  -Toradol 10 mg IM and Depo Medrol 40 mg IM x 1 now. Hold ASA x 3 days. Will follow up with patient next week to ensure improvement.     Nursing to continue supportive care for all ADLs.    Patient was encouraged to keep me informed of any acute changes, lack of improvement, or any new concerning symptoms.  Patient voiced understanding of all instructions and denied further questions.     Will follow up and continue to monitor.     [x]  Discussed Patient in detail with nursing/staff, addressed all needs today.     [x]  Plan of Care Reviewed    [x]  PT/OT Reviewed   [x]  Order Changes  [x]  Discharge Plans Reviewed  [x]  Advance Directive on file with Nursing Home.   [x]  POA on file with Nursing Home.   [x]  Code Status listed: [x]  Full Code   []  DNR     “I confirm accuracy of unchanged data/findings which have been carried forward from previous visit, as well as I have updated appropriately those that have changed.”       Marlene Garcia, APRN.

## 2023-11-15 NOTE — LETTER
Nursing Home Follow Up Note      Luigi Urena DO []   LATONIA Tony [x]  [address] [place] [address]  Phone: [phone]  Fax: [phone] Marcell Christina MD []    Rm Tran DO []   [address] [place] [address]  Phone: [phone]  Fax: [phone]     PATIENT NAME: Viviane Peñaloza                                                                          YOB: 1955           DATE OF SERVICE: 11/15/2023  FACILITY:  [][facility]   [] [facility]   [x] [facility]   [] [facility]   [] Other ______________________________________________________________________      CHIEF COMPLAINT:    Right lower back pain that radiates down buttock and back of leg for the past couple days.      HISTORY OF PRESENT ILLNESS:     Patient has complaints today of right lower back pain that radiates down her right buttock and the back of her right leg to her knee.  She reports that she has been having this for the past 3 days and none of her regular medications or Tylenol have helped any.  She reports the pain is a throbbing and aching pain that becomes sharp with certain range of motion and is constant.  Sometimes certain positions will relieve it some and walking makes it worse.  Reports that she has been having some more knee pain recently and has been walking with an altered gait.  She has no other complaints today and reports that she is doing well otherwise.  She did have the symptoms about 3 months ago.  She has no other concerns at this time.    PAST MEDICAL & SURGICAL HISTORY:   Past Medical History:   Diagnosis Date    Anemia     Anxiety     Ascorbic acid deficiency     Depression     Epilepsy     Fracture of unspecified phalanx of left index finger, initial encounter for closed fracture 01/16/2019    Fracture, clavicle     left     Hyperlipidemia     Hypertension     Need for assistance with personal care     Obesity     Presence of artificial knee joint, left     Presence of right  artificial knee joint     Repeated falls     Shoulder fracture, left     Vitamin D deficiency       Past Surgical History:   Procedure Laterality Date    OOPHORECTOMY           MEDICATIONS:  I have reviewed and reconciled the patients medication list in the patients chart at the skilled nursing facility today.      ALLERGIES:    No Known Allergies      SOCIAL HISTORY:    Social History     Socioeconomic History    Marital status: Single   Tobacco Use    Smoking status: Never    Smokeless tobacco: Never   Substance and Sexual Activity    Alcohol use: No    Drug use: No    Sexual activity: Defer       FAMILY HISTORY:    Family History   Problem Relation Age of Onset    Diabetes Mother     Heart disease Mother     Hypertension Mother     Hypertension Father     Diabetes Sister     Hypertension Sister        REVIEW OF SYSTEMS:    Review of Systems   Constitutional:  Negative for activity change, appetite change, chills, diaphoresis, fatigue, fever, unexpected weight gain and unexpected weight loss.   HENT:  Negative for congestion, ear pain, mouth sores, nosebleeds, postnasal drip, sneezing, sore throat and trouble swallowing.    Eyes:  Negative for discharge, redness, itching and visual disturbance.   Respiratory:  Negative for apnea, cough, choking, chest tightness, shortness of breath, wheezing and stridor.    Cardiovascular:  Negative for chest pain, palpitations and leg swelling.   Gastrointestinal:  Negative for abdominal distention, abdominal pain, blood in stool, constipation, diarrhea, nausea, vomiting, GERD and indigestion.   Genitourinary:  Negative for decreased urine volume, difficulty urinating, dysuria, flank pain, frequency, hematuria, urgency and urinary incontinence.   Musculoskeletal:  Positive for arthralgias (chronic) and back pain. Negative for gait problem, joint swelling and myalgias.   Skin:  Negative for color change, dry skin, rash and skin lesions.   Neurological:  Positive for weakness and  memory problem. Negative for dizziness, seizures, speech difficulty and confusion.        Sciatica   Psychiatric/Behavioral:  Negative for behavioral problems, dysphoric mood, hallucinations, sleep disturbance and depressed mood. The patient is not nervous/anxious.          PHYSICAL EXAMINATION:   VITAL SIGNS:   Vitals:    11/15/23 1457   BP: 136/74   Pulse: 68   Resp: 16   Temp: 98.2 °F (36.8 °C)   SpO2: 98%   Weight: 83.6 kg (184 lb 6.4 oz)        Physical Exam  Vitals and nursing note reviewed.   Constitutional:       Appearance: She is well-developed.   Cardiovascular:      Rate and Rhythm: Normal rate.   Pulmonary:      Effort: Pulmonary effort is normal. No respiratory distress.   Musculoskeletal:        Arms:       Comments: LE weakness   Skin:     Findings: No rash.   Neurological:      General: No focal deficit present.      Mental Status: She is alert and oriented to person, place, and time.   Psychiatric:         Mood and Affect: Mood normal.         Behavior: Behavior normal.         Cognition and Memory: Cognition and memory normal.         RECORDS REVIEW:   I have reviewed records in [facility]    ASSESSMENT     Diagnoses and all orders for this visit:    1. Acute right-sided low back pain with right-sided sciatica (Primary)    2. Primary osteoarthritis of both knees    3. Impaired mobility and ADLs          PLAN    Right sided back pain with sciatica/OA bilateral knees  -Toradol 10 mg IM and Depo Medrol 40 mg IM x 1 now. Hold ASA x 3 days. Will follow up with patient next week to ensure improvement.     Nursing to continue supportive care for all ADLs.    Patient was encouraged to keep me informed of any acute changes, lack of improvement, or any new concerning symptoms.  Patient voiced understanding of all instructions and denied further questions.     Will follow up and continue to monitor.     [x]  Discussed Patient in detail with nursing/staff, addressed all needs today.     [x]  Plan of Care Reviewed    [x]  PT/OT Reviewed   [x]  Order Changes  [x]  Discharge Plans Reviewed  [x]  Advance Directive on file with Nursing Home.   [x]  POA on file with Nursing Home.   [x]  Code Status listed: [x]  Full Code   []  DNR     “I confirm accuracy of unchanged data/findings which have been carried forward from previous visit, as well as I have updated appropriately those that have changed.”       Marlene Garcia, APRN.

## 2023-11-17 RX ORDER — PHENOBARBITAL 64.8 MG/1
64.8 TABLET ORAL 2 TIMES DAILY
Qty: 60 TABLET | Refills: 5 | Status: SHIPPED | OUTPATIENT
Start: 2023-11-17

## 2023-11-17 NOTE — TELEPHONE ENCOUNTER
PORF[facility] REQUESTING MED REFILL FOR PHENOBARBITAL 64.8 MG.    DIRECTIONS: PHENOBARBITAL 64.8 MG 1  TAB PO BID.

## 2023-11-21 ENCOUNTER — NURSING HOME (OUTPATIENT)
Dept: FAMILY MEDICINE CLINIC | Facility: CLINIC | Age: 68
End: 2023-11-21
Payer: MEDICARE

## 2023-11-21 VITALS
DIASTOLIC BLOOD PRESSURE: 70 MMHG | WEIGHT: 184.4 LBS | OXYGEN SATURATION: 98 % | SYSTOLIC BLOOD PRESSURE: 128 MMHG | RESPIRATION RATE: 18 BRPM | BODY MASS INDEX: 33.73 KG/M2 | HEART RATE: 72 BPM | TEMPERATURE: 97.4 F

## 2023-11-21 DIAGNOSIS — Z78.9 IMPAIRED MOBILITY AND ADLS: Chronic | ICD-10-CM

## 2023-11-21 DIAGNOSIS — Z74.09 IMPAIRED MOBILITY AND ADLS: Chronic | ICD-10-CM

## 2023-11-21 DIAGNOSIS — R93.7 ABNORMAL X-RAY OF BONE: ICD-10-CM

## 2023-11-21 DIAGNOSIS — S92.352A CLOSED DISPLACED FRACTURE OF FIFTH METATARSAL BONE OF LEFT FOOT, INITIAL ENCOUNTER: Primary | ICD-10-CM

## 2023-11-21 NOTE — LETTER
Nursing Home Follow Up Note      Luigi Urena DO []   LATONIA Tony [x]  [address] [place] [address]  Phone: [phone]  Fax: [phone] Marcell Christina MD []    Rm Tran DO []   [address] [place] [address]  Phone: [phone]  Fax: [phone]     PATIENT NAME: Viviane Peñaloza                                                                          YOB: 1955           DATE OF SERVICE: 11/21/2023  FACILITY:  [][facility]   [] [facility]   [x] [facility]   [] [facility]   [] Other ______________________________________________________________________      CHIEF COMPLAINT:    Follow-up abnormal foot x-ray results.      HISTORY OF PRESENT ILLNESS:     X-ray of left foot performed yesterday evening and results available today.  Results report mildly displaced fracture of the fifth metatarsal.  Patient reports that on the previous that she got up to go to the restroom and hit her toe into the door frame while walking into the bathroom.  She reports that it hurts some initially but became worse the next day in the evening.  The pain is not that bad unless palpated, due to the pain medication she is on.  Reports that she is doing well otherwise no pain anywhere else.  No other complaints or concerns.  No signs and symptoms of any distress.    PAST MEDICAL & SURGICAL HISTORY:   Past Medical History:   Diagnosis Date    Anemia     Anxiety     Ascorbic acid deficiency     Depression     Epilepsy     Fracture of unspecified phalanx of left index finger, initial encounter for closed fracture 01/16/2019    Fracture, clavicle     left     Hyperlipidemia     Hypertension     Need for assistance with personal care     Obesity     Presence of artificial knee joint, left     Presence of right artificial knee joint     Repeated falls     Shoulder fracture, left     Vitamin D deficiency       Past Surgical History:   Procedure Laterality Date    OOPHORECTOMY            MEDICATIONS:  I have reviewed and reconciled the patients medication list in the patients chart at the skilled nursing facility today.      ALLERGIES:    Allergies   Allergen Reactions    Tramadol Other (See Comments)         SOCIAL HISTORY:    Social History     Socioeconomic History    Marital status: Single   Tobacco Use    Smoking status: Never    Smokeless tobacco: Never   Substance and Sexual Activity    Alcohol use: No    Drug use: No    Sexual activity: Defer       FAMILY HISTORY:    Family History   Problem Relation Age of Onset    Diabetes Mother     Heart disease Mother     Hypertension Mother     Hypertension Father     Diabetes Sister     Hypertension Sister        REVIEW OF SYSTEMS:    Review of Systems   Constitutional:  Negative for activity change, appetite change, chills, diaphoresis, fatigue, fever, unexpected weight gain and unexpected weight loss.   HENT:  Negative for congestion, mouth sores, nosebleeds and trouble swallowing.    Respiratory:  Negative for cough, choking, chest tightness, shortness of breath and wheezing.    Cardiovascular:  Negative for chest pain, palpitations and leg swelling.   Gastrointestinal:  Negative for abdominal distention, abdominal pain, blood in stool, constipation, diarrhea, nausea, vomiting, GERD and indigestion.   Genitourinary:  Negative for decreased urine volume, difficulty urinating, dysuria, flank pain, frequency, hematuria, urgency and urinary incontinence.   Musculoskeletal:  Positive for arthralgias (left foot 5 th digit), back pain and gait problem. Negative for joint swelling and myalgias.   Skin:  Negative for color change, dry skin, rash and skin lesions.   Neurological:  Positive for weakness and memory problem. Negative for dizziness and confusion.   Psychiatric/Behavioral:  Negative for behavioral problems, dysphoric mood, hallucinations, sleep disturbance and depressed mood. The patient is not nervous/anxious.          PHYSICAL EXAMINATION:    VITAL SIGNS:   Vitals:    11/21/23 1520   BP: 128/70   Pulse: 72   Resp: 18   Temp: 97.4 °F (36.3 °C)   SpO2: 98%   Weight: 83.6 kg (184 lb 6.4 oz)        Physical Exam  Vitals and nursing note reviewed.   Constitutional:       Appearance: She is well-developed.   Cardiovascular:      Rate and Rhythm: Normal rate.   Pulmonary:      Effort: Pulmonary effort is normal. No respiratory distress.   Musculoskeletal:      Left foot: Decreased range of motion. Swelling and tenderness present.        Legs:       Comments: LE weakness   Skin:     Findings: No rash.   Neurological:      General: No focal deficit present.      Mental Status: She is alert and oriented to person, place, and time.   Psychiatric:         Mood and Affect: Mood normal.         Behavior: Behavior normal.         Cognition and Memory: Cognition and memory normal.         RECORDS REVIEW:   I have reviewed records in [facility]    ASSESSMENT     Diagnoses and all orders for this visit:    1. Closed displaced fracture of fifth metatarsal bone of left foot, initial encounter (Primary)    2. Abnormal x-ray of bone    3. Impaired mobility and ADLs            PLAN    Displaced fracture left fifth metatarsal/abnormal xray  -Nursing to take fifth digit to the fourth digit to immobilize and keep stable.  Nursing to place consult with her orthopedist.  Pain is currently controlled.  She was advised to be nonweightbearing on the foot and to move about in her wheelchair until further instructions given by Ortho.    Nursing to continue supportive care for all ADLs.    Patient was encouraged to keep me informed of any acute changes, lack of improvement, or any new concerning symptoms.  Patient voiced understanding of all instructions and denied further questions.     Will follow up and continue to monitor.     [x]  Discussed Patient in detail with nursing/staff, addressed all needs today.     [x]  Plan of Care Reviewed   [x]  PT/OT Reviewed   [x]  Order Changes  [x]   Discharge Plans Reviewed  [x]  Advance Directive on file with Nursing Home.   [x]  POA on file with Nursing Home.   [x]  Code Status listed: [x]  Full Code   []  DNR     “I confirm accuracy of unchanged data/findings which have been carried forward from previous visit, as well as I have updated appropriately those that have changed.”     I spent 30 minutes caring for Viviane on this date of service. This time includes time spent by me in the following activities:preparing for the visit, reviewing tests, obtaining and/or reviewing a separately obtained history, performing a medically appropriate examination and/or evaluation , counseling and educating the patient/family/caregiver, ordering medications, tests, or procedures, referring and communicating with other health care professionals , documenting information in the medical record, independently interpreting results and communicating that information with the patient/family/caregiver, and care coordination       Marlene Garcia, APRN.

## 2023-11-22 NOTE — PROGRESS NOTES
Nursing Home Follow Up Note      Luigi Urena DO []   LATONIA Tony [x]  [address] [place] [address]  Phone: [phone]  Fax: [phone] Marcell Christina MD []    Rm Tran DO []   [address] [place] [address]  Phone: [phone]  Fax: [phone]     PATIENT NAME: Viviane Peñaloza                                                                          YOB: 1955           DATE OF SERVICE: 11/21/2023  FACILITY:  [][facility]   [] [facility]   [x] [facility]   [] [facility]   [] Other ______________________________________________________________________      CHIEF COMPLAINT:    Follow-up abnormal foot x-ray results.      HISTORY OF PRESENT ILLNESS:     X-ray of left foot performed yesterday evening and results available today.  Results report mildly displaced fracture of the fifth metatarsal.  Patient reports that on the previous that she got up to go to the restroom and hit her toe into the door frame while walking into the bathroom.  She reports that it hurts some initially but became worse the next day in the evening.  The pain is not that bad unless palpated, due to the pain medication she is on.  Reports that she is doing well otherwise no pain anywhere else.  No other complaints or concerns.  No signs and symptoms of any distress.    PAST MEDICAL & SURGICAL HISTORY:   Past Medical History:   Diagnosis Date    Anemia     Anxiety     Ascorbic acid deficiency     Depression     Epilepsy     Fracture of unspecified phalanx of left index finger, initial encounter for closed fracture 01/16/2019    Fracture, clavicle     left     Hyperlipidemia     Hypertension     Need for assistance with personal care     Obesity     Presence of artificial knee joint, left     Presence of right artificial knee joint     Repeated falls     Shoulder fracture, left     Vitamin D deficiency       Past Surgical History:   Procedure Laterality Date    OOPHORECTOMY            MEDICATIONS:  I have reviewed and reconciled the patients medication list in the patients chart at the skilled nursing facility today.      ALLERGIES:    Allergies   Allergen Reactions    Tramadol Other (See Comments)         SOCIAL HISTORY:    Social History     Socioeconomic History    Marital status: Single   Tobacco Use    Smoking status: Never    Smokeless tobacco: Never   Substance and Sexual Activity    Alcohol use: No    Drug use: No    Sexual activity: Defer       FAMILY HISTORY:    Family History   Problem Relation Age of Onset    Diabetes Mother     Heart disease Mother     Hypertension Mother     Hypertension Father     Diabetes Sister     Hypertension Sister        REVIEW OF SYSTEMS:    Review of Systems   Constitutional:  Negative for activity change, appetite change, chills, diaphoresis, fatigue, fever, unexpected weight gain and unexpected weight loss.   HENT:  Negative for congestion, mouth sores, nosebleeds and trouble swallowing.    Respiratory:  Negative for cough, choking, chest tightness, shortness of breath and wheezing.    Cardiovascular:  Negative for chest pain, palpitations and leg swelling.   Gastrointestinal:  Negative for abdominal distention, abdominal pain, blood in stool, constipation, diarrhea, nausea, vomiting, GERD and indigestion.   Genitourinary:  Negative for decreased urine volume, difficulty urinating, dysuria, flank pain, frequency, hematuria, urgency and urinary incontinence.   Musculoskeletal:  Positive for arthralgias (left foot 5 th digit), back pain and gait problem. Negative for joint swelling and myalgias.   Skin:  Negative for color change, dry skin, rash and skin lesions.   Neurological:  Positive for weakness and memory problem. Negative for dizziness and confusion.   Psychiatric/Behavioral:  Negative for behavioral problems, dysphoric mood, hallucinations, sleep disturbance and depressed mood. The patient is not nervous/anxious.          PHYSICAL EXAMINATION:    VITAL SIGNS:   Vitals:    11/21/23 1520   BP: 128/70   Pulse: 72   Resp: 18   Temp: 97.4 °F (36.3 °C)   SpO2: 98%   Weight: 83.6 kg (184 lb 6.4 oz)        Physical Exam  Vitals and nursing note reviewed.   Constitutional:       Appearance: She is well-developed.   Cardiovascular:      Rate and Rhythm: Normal rate.   Pulmonary:      Effort: Pulmonary effort is normal. No respiratory distress.   Musculoskeletal:      Left foot: Decreased range of motion. Swelling and tenderness present.        Legs:       Comments: LE weakness   Skin:     Findings: No rash.   Neurological:      General: No focal deficit present.      Mental Status: She is alert and oriented to person, place, and time.   Psychiatric:         Mood and Affect: Mood normal.         Behavior: Behavior normal.         Cognition and Memory: Cognition and memory normal.         RECORDS REVIEW:   I have reviewed records in [facility]    ASSESSMENT     Diagnoses and all orders for this visit:    1. Closed displaced fracture of fifth metatarsal bone of left foot, initial encounter (Primary)    2. Abnormal x-ray of bone    3. Impaired mobility and ADLs            PLAN    Displaced fracture left fifth metatarsal/abnormal xray  -Nursing to take fifth digit to the fourth digit to immobilize and keep stable.  Nursing to place consult with her orthopedist.  Pain is currently controlled.  She was advised to be nonweightbearing on the foot and to move about in her wheelchair until further instructions given by Ortho.    Nursing to continue supportive care for all ADLs.    Patient was encouraged to keep me informed of any acute changes, lack of improvement, or any new concerning symptoms.  Patient voiced understanding of all instructions and denied further questions.     Will follow up and continue to monitor.     [x]  Discussed Patient in detail with nursing/staff, addressed all needs today.     [x]  Plan of Care Reviewed   [x]  PT/OT Reviewed   [x]  Order Changes  [x]   Discharge Plans Reviewed  [x]  Advance Directive on file with Nursing Home.   [x]  POA on file with Nursing Home.   [x]  Code Status listed: [x]  Full Code   []  DNR     “I confirm accuracy of unchanged data/findings which have been carried forward from previous visit, as well as I have updated appropriately those that have changed.”     I spent 30 minutes caring for Viviane on this date of service. This time includes time spent by me in the following activities:preparing for the visit, reviewing tests, obtaining and/or reviewing a separately obtained history, performing a medically appropriate examination and/or evaluation , counseling and educating the patient/family/caregiver, ordering medications, tests, or procedures, referring and communicating with other health care professionals , documenting information in the medical record, independently interpreting results and communicating that information with the patient/family/caregiver, and care coordination       Marlene Garcia, APRN.

## 2023-12-11 RX ORDER — CLONAZEPAM 0.5 MG/1
0.5 TABLET ORAL 2 TIMES DAILY
Qty: 60 TABLET | Refills: 5 | Status: SHIPPED | OUTPATIENT
Start: 2023-12-11

## 2023-12-20 ENCOUNTER — NURSING HOME (OUTPATIENT)
Dept: FAMILY MEDICINE CLINIC | Facility: CLINIC | Age: 68
End: 2023-12-20
Payer: MEDICARE

## 2023-12-20 VITALS
DIASTOLIC BLOOD PRESSURE: 78 MMHG | RESPIRATION RATE: 18 BRPM | TEMPERATURE: 97.9 F | SYSTOLIC BLOOD PRESSURE: 128 MMHG | WEIGHT: 182.8 LBS | BODY MASS INDEX: 33.43 KG/M2 | OXYGEN SATURATION: 98 % | HEART RATE: 74 BPM

## 2023-12-20 DIAGNOSIS — M79.672 ACUTE FOOT PAIN, LEFT: ICD-10-CM

## 2023-12-20 DIAGNOSIS — E55.9 VITAMIN D DEFICIENCY: ICD-10-CM

## 2023-12-20 DIAGNOSIS — G40.309 NONINTRACTABLE GENERALIZED IDIOPATHIC EPILEPSY WITHOUT STATUS EPILEPTICUS: Chronic | ICD-10-CM

## 2023-12-20 DIAGNOSIS — Z74.09 IMPAIRED MOBILITY AND ADLS: Primary | Chronic | ICD-10-CM

## 2023-12-20 DIAGNOSIS — Z78.9 IMPAIRED MOBILITY AND ADLS: Primary | Chronic | ICD-10-CM
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
## 2023-12-20 DIAGNOSIS — M17.0 PRIMARY OSTEOARTHRITIS OF BOTH KNEES: ICD-10-CM

## 2023-12-20 DIAGNOSIS — E78.2 MIXED HYPERLIPIDEMIA: ICD-10-CM

## 2023-12-20 DIAGNOSIS — S92.352A DISPLACED FRACTURE OF FIFTH METATARSAL BONE, LEFT FOOT, INITIAL ENCOUNTER FOR CLOSED FRACTURE: ICD-10-CM

## 2023-12-20 NOTE — LETTER
Nursing Home Progress Note        Luigi Urena DO [x]  LATONIA Tony []  [address] [place] [address]  Phone: [phone]  Fax: [phone] Marcell Christina MD []  Rm Tran DO []  [address] [place] [address]  Phone: [phone]  Fax: [phone]     PATIENT NAME: Viviane Peñaloza                                                                          YOB: 1955           DATE OF SERVICE: 12/20/2023  FACILITY: []  [place]  [] [place]  [x]  [facility]  [] [facility]  []  Other ______________________________________________________________________     CHIEF COMPLAINT:   Chronic medical management and long-term care/seizure activity      HISTORY OF PRESENT ILLNESS:   [x]  Follow Up visit for coordination of long term care issues and chronic medical management of Diagnoses and all orders for this visit:    1. Impaired mobility and ADLs (Primary)    2. Nonintractable generalized idiopathic epilepsy without status epilepticus    3. Primary osteoarthritis of both knees    4. Mixed hyperlipidemia    5. Vitamin D deficiency    6. Acute foot pain, left    7. Injury due to fall, initial encounter    8. Displaced fracture of fifth metatarsal bone, left foot, initial encounter for closed fracture    Patient remains interactive during questioning/examination.  She unfortunately suffered a mechanical/accidental fall while ambulating to/from the bathroom.  It resulted in an injury to her left ankle/foot.  She was seen by orthopedic surgery after x-rays demonstrated a slightly displaced left fifth metatarsal fracture.  Surgery was not recommended, instead she was placed in an Unna boot for 4 days, then transitioned to a low tide pneumatic walking boot.  Patient has not been compliant with strict use of pneumatic walking boot.  She was in fact ambulating without it at the time of my exam today.  She does try to utilize it as much time through the day as she can.    Continues  to have significant discomfort to the foot additionally.  Her altered ambulation/mobilization has led to some lumbar discomfort.    No new falls or injuries reported otherwise.  No nutritional/hydration deficits.    No seizure-like activity.    Vital signs have been stable.      PAST MEDICAL & SURGICAL HISTORY:   Past Medical History:   Diagnosis Date    Anemia     Anxiety     Ascorbic acid deficiency     Depression     Epilepsy     Fracture of unspecified phalanx of left index finger, initial encounter for closed fracture 01/16/2019    Fracture, clavicle     left     Hyperlipidemia     Hypertension     Need for assistance with personal care     Obesity     Presence of artificial knee joint, left     Presence of right artificial knee joint     Repeated falls     Shoulder fracture, left     Vitamin D deficiency       Past Surgical History:   Procedure Laterality Date    OOPHORECTOMY           MEDICATIONS:  I have reviewed and reconciled the patients medication list in the patients chart at the skilled nursing facility today.      ALLERGIES:    Allergies   Allergen Reactions    Tramadol Other (See Comments)         SOCIAL HISTORY:    Social History     Socioeconomic History    Marital status: Single   Tobacco Use    Smoking status: Never    Smokeless tobacco: Never   Substance and Sexual Activity    Alcohol use: No    Drug use: No    Sexual activity: Defer       FAMILY HISTORY:    Family History   Problem Relation Age of Onset    Diabetes Mother     Heart disease Mother     Hypertension Mother     Hypertension Father     Diabetes Sister     Hypertension Sister        REVIEW OF SYSTEMS:    Review of Systems  Appetite: Fair []   Good [x]   Poor []   Weight Loss []  [x]  Weight Stable   Unavoidable Weight Loss []  Tolerating Tube Feeding []    Supplements Provided []   Constitutional: Negative for fever, chills, diaphoresis or fatigue and weight change.   HENT: No dysphagia; no changes to vision/hearing/smell/taste; no  epistaxis  Eyes: Negative for redness and visual disturbance.   Respiratory: Negative for shortness of breath, chest pain, cough or chest tightness.   Cardiovascular: Negative for chest pain and palpitations.   Gastrointestinal: Negative for abdominal distention, abdominal pain and blood in stool.   Endocrine: Negative for cold intolerance and heat intolerance.   Genitourinary: Negative for difficulty urinating, dysuria and frequency.Negative for hematuria   Musculoskeletal: Chronic myalgias and arthralgias, worse to the left knee but improving.  Left foot as per above.  Integumentary: Postsurgical scarring noted to the left knee.  No drainage or streaking erythema.  Neurological: Negative for syncope, weakness and headaches.   Hematological: Negative for adenopathy. Does not bruise/bleed easily.   Immunological: Negative for reported allergies or immunological disorders  Psychological: No acute behavioral changes    PHYSICAL EXAMINATION:   VITAL SIGNS:   Vitals:    12/20/23 0936   BP: 128/78   Pulse: 74   Resp: 18   Temp: 97.9 °F (36.6 °C)   SpO2: 98%       Physical Exam      General Appearance:  [x]  Alert   [x]  Oriented x person  [x]  No acute distress     []  Confused  []  Disoriented   []  Comatose   Head:  Atraumatic and normocephalic, without obvious abnormality.   Eyes:         PERRLA, conjunctivae and sclerae normal, no Icterus. No pallor. Extra-occular movements are within normal limits.   Ears:  Ears appear intact with no abnormalities noted.   Throat: No oral lesions, no thrush, oral mucosa moist.   Neck: Supple, trachea midline, no thyromegaly, no carotid bruit.   Back:   No kyphoscoliosis. No tenderness to palpation.   Lungs:   Chest shape is normal. Breath sounds heard bilaterally equally.  No wheezing.    Audible air exchange noted all lung fields.   Heart:  Normal S1 and S2, no murmur, no gallop, no rub. No JVD.   Abdomen:   Normal bowel sounds, no masses, no organomegaly. Soft, non-tender,  non-distended, no guarding.  Mild truncal obesity.   Extremities: Moves all extremities; mild gravity dependent edema of bilateral lower extremities, without cyanosis or clubbing.    Poor core strength and stability.   Requires assistance with transfers at times.  Utilizes walker for assistance with ambulation.  Quadriceps mechanism intact.  Johnathan/Nguyen test negative.  Symmetric and appropriate dorsiflexion and plantar flexion of bilateral feet.  Pain on palpation of the lateral dorsal foot.   Pulses: Pulses palpable and equal bilaterally.   Skin: No bleeding or rash.  Generalized dry skin noted.  Age-related atrophy of skin.  No lesions or rashes.   Neurologic: [x] Normal speech []  Normal mental status    [x] Cranial nerves II through XII intact   [x]  No anosmia [x]  DTR 2+ [x]  Proprioception intact  [x]  No focal motor/sensory deficits      Psych/Mood:                    [x]  No acute changes []  Depressed  Urinary:                            [x]  Continent  []  Incontinent []  Retention  []  F/C      []  UTI w/treatment in progress           ASSESSMENT     Diagnoses and all orders for this visit:    1. Impaired mobility and ADLs (Primary)    2. Nonintractable generalized idiopathic epilepsy without status epilepticus    3. Primary osteoarthritis of both knees    4. Mixed hyperlipidemia    5. Vitamin D deficiency    6. Acute foot pain, left    7. Injury due to fall, initial encounter    8. Displaced fracture of fifth metatarsal bone, left foot, initial encounter for closed fracture          PLAN  Planned utilization of low compression stocking to the left lower extremity.  I have stressed the importance of compliance with use of low tide pneumatic walking boot.  Plan to keep scheduled follow-up appointment with orthopedic surgery, which should be at a 4-week interval.  Pain appears clinically well-controlled.  I do suspect her acute myofascial strain of the lumbar spine as a result of altered  stance/ambulation secondary to her left foot metatarsal fracture and pneumatic walking boot use.  I think it would be reasonable to utilize 40 mg IM injection of Depo-Medrol, as well as a 30 mg IM injection of ketorolac and treatment.  Plan to follow clinically.  She can alternate use of ice compress and moist heat therapy to the affected area of her lumbar spine.    Vital signs demonstrate hemodynamic stability.  Blood pressure is at goal.      Continue current antiepileptic treatment regimen.    Utilize supportive care for mobilization/transfer assistance.    Surveillance labs when needed.                [x]  Discussed Patient in detail with nursing/staff, addressed all needs today.     [x]  Plan of Care Reviewed   []  PT/OT Reviewed   []  Order Changes  []  Discharge Plans Reviewed   []  Code Status Changes     I spent 30 minutes caring for Viviane on this date of service. This time includes time spent by me in the following activities:preparing for the visit, reviewing tests, performing a medically appropriate examination and/or evaluation , counseling and educating the patient/family/caregiver, ordering medications, tests, or procedures, documenting information in the medical record, and care coordination    I have reviewed and updated all copied forward information, as appropriate.  I attest to the accuracy and relevance of any unchanged information.    Luigi Urena DO  12/20/2023

## 2023-12-20 NOTE — PROGRESS NOTES
Nursing Home Progress Note        Luigi Urena DO [x]  LATONIA Tony []  [address] [place] [address]  Phone: [phone]  Fax: [phone] Marcell Christina MD []  Rm Tran DO []  [address] [place] [address]  Phone: [phone]  Fax: [phone]     PATIENT NAME: Viviane Peñaloza                                                                          YOB: 1955           DATE OF SERVICE: 12/20/2023  FACILITY: []  [place] [facility]  [] [place]  [x]  [facility]  [] [facility]  []  Other ______________________________________________________________________     CHIEF COMPLAINT:   Chronic medical management and long-term care/seizure activity      HISTORY OF PRESENT ILLNESS:   [x]  Follow Up visit for coordination of long term care issues and chronic medical management of Diagnoses and all orders for this visit:    1. Impaired mobility and ADLs (Primary)    2. Nonintractable generalized idiopathic epilepsy without status epilepticus    3. Primary osteoarthritis of both knees    4. Mixed hyperlipidemia    5. Vitamin D deficiency    6. Acute foot pain, left    7. Injury due to fall, initial encounter    8. Displaced fracture of fifth metatarsal bone, left foot, initial encounter for closed fracture    Patient remains interactive during questioning/examination.  She unfortunately suffered a mechanical/accidental fall while ambulating to/from the bathroom.  It resulted in an injury to her left ankle/foot.  She was seen by orthopedic surgery after x-rays demonstrated a slightly displaced left fifth metatarsal fracture.  Surgery was not recommended, instead she was placed in an Unna boot for 4 days, then transitioned to a low tide pneumatic walking boot.  Patient has not been compliant with strict use of pneumatic walking boot.  She was in fact ambulating without it at the time of my exam today.  She does try to utilize it as much time through the day as she can.    Continues  to have significant discomfort to the foot additionally.  Her altered ambulation/mobilization has led to some lumbar discomfort.    No new falls or injuries reported otherwise.  No nutritional/hydration deficits.    No seizure-like activity.    Vital signs have been stable.      PAST MEDICAL & SURGICAL HISTORY:   Past Medical History:   Diagnosis Date    Anemia     Anxiety     Ascorbic acid deficiency     Depression     Epilepsy     Fracture of unspecified phalanx of left index finger, initial encounter for closed fracture 01/16/2019    Fracture, clavicle     left     Hyperlipidemia     Hypertension     Need for assistance with personal care     Obesity     Presence of artificial knee joint, left     Presence of right artificial knee joint     Repeated falls     Shoulder fracture, left     Vitamin D deficiency       Past Surgical History:   Procedure Laterality Date    OOPHORECTOMY           MEDICATIONS:  I have reviewed and reconciled the patients medication list in the patients chart at the skilled nursing facility today.      ALLERGIES:    Allergies   Allergen Reactions    Tramadol Other (See Comments)         SOCIAL HISTORY:    Social History     Socioeconomic History    Marital status: Single   Tobacco Use    Smoking status: Never    Smokeless tobacco: Never   Substance and Sexual Activity    Alcohol use: No    Drug use: No    Sexual activity: Defer       FAMILY HISTORY:    Family History   Problem Relation Age of Onset    Diabetes Mother     Heart disease Mother     Hypertension Mother     Hypertension Father     Diabetes Sister     Hypertension Sister        REVIEW OF SYSTEMS:    Review of Systems  Appetite: Fair []   Good [x]   Poor []   Weight Loss []  [x]  Weight Stable   Unavoidable Weight Loss []  Tolerating Tube Feeding []    Supplements Provided []   Constitutional: Negative for fever, chills, diaphoresis or fatigue and weight change.   HENT: No dysphagia; no changes to vision/hearing/smell/taste; no  epistaxis  Eyes: Negative for redness and visual disturbance.   Respiratory: Negative for shortness of breath, chest pain, cough or chest tightness.   Cardiovascular: Negative for chest pain and palpitations.   Gastrointestinal: Negative for abdominal distention, abdominal pain and blood in stool.   Endocrine: Negative for cold intolerance and heat intolerance.   Genitourinary: Negative for difficulty urinating, dysuria and frequency.Negative for hematuria   Musculoskeletal: Chronic myalgias and arthralgias, worse to the left knee but improving.  Left foot as per above.  Integumentary: Postsurgical scarring noted to the left knee.  No drainage or streaking erythema.  Neurological: Negative for syncope, weakness and headaches.   Hematological: Negative for adenopathy. Does not bruise/bleed easily.   Immunological: Negative for reported allergies or immunological disorders  Psychological: No acute behavioral changes    PHYSICAL EXAMINATION:   VITAL SIGNS:   Vitals:    12/20/23 0936   BP: 128/78   Pulse: 74   Resp: 18   Temp: 97.9 °F (36.6 °C)   SpO2: 98%       Physical Exam      General Appearance:  [x]  Alert   [x]  Oriented x person  [x]  No acute distress     []  Confused  []  Disoriented   []  Comatose   Head:  Atraumatic and normocephalic, without obvious abnormality.   Eyes:         PERRLA, conjunctivae and sclerae normal, no Icterus. No pallor. Extra-occular movements are within normal limits.   Ears:  Ears appear intact with no abnormalities noted.   Throat: No oral lesions, no thrush, oral mucosa moist.   Neck: Supple, trachea midline, no thyromegaly, no carotid bruit.   Back:   No kyphoscoliosis. No tenderness to palpation.   Lungs:   Chest shape is normal. Breath sounds heard bilaterally equally.  No wheezing.    Audible air exchange noted all lung fields.   Heart:  Normal S1 and S2, no murmur, no gallop, no rub. No JVD.   Abdomen:   Normal bowel sounds, no masses, no organomegaly. Soft, non-tender,  non-distended, no guarding.  Mild truncal obesity.   Extremities: Moves all extremities; mild gravity dependent edema of bilateral lower extremities, without cyanosis or clubbing.    Poor core strength and stability.   Requires assistance with transfers at times.  Utilizes walker for assistance with ambulation.  Quadriceps mechanism intact.  Johnathan/Nguyen test negative.  Symmetric and appropriate dorsiflexion and plantar flexion of bilateral feet.  Pain on palpation of the lateral dorsal foot.   Pulses: Pulses palpable and equal bilaterally.   Skin: No bleeding or rash.  Generalized dry skin noted.  Age-related atrophy of skin.  No lesions or rashes.   Neurologic: [x] Normal speech []  Normal mental status    [x] Cranial nerves II through XII intact   [x]  No anosmia [x]  DTR 2+ [x]  Proprioception intact  [x]  No focal motor/sensory deficits      Psych/Mood:                    [x]  No acute changes []  Depressed  Urinary:                            [x]  Continent  []  Incontinent []  Retention  []  F/C      []  UTI w/treatment in progress           ASSESSMENT     Diagnoses and all orders for this visit:    1. Impaired mobility and ADLs (Primary)    2. Nonintractable generalized idiopathic epilepsy without status epilepticus    3. Primary osteoarthritis of both knees    4. Mixed hyperlipidemia    5. Vitamin D deficiency    6. Acute foot pain, left    7. Injury due to fall, initial encounter    8. Displaced fracture of fifth metatarsal bone, left foot, initial encounter for closed fracture          PLAN  Planned utilization of low compression stocking to the left lower extremity.  I have stressed the importance of compliance with use of low tide pneumatic walking boot.  Plan to keep scheduled follow-up appointment with orthopedic surgery, which should be at a 4-week interval.  Pain appears clinically well-controlled.  I do suspect her acute myofascial strain of the lumbar spine as a result of altered  stance/ambulation secondary to her left foot metatarsal fracture and pneumatic walking boot use.  I think it would be reasonable to utilize 40 mg IM injection of Depo-Medrol, as well as a 30 mg IM injection of ketorolac and treatment.  Plan to follow clinically.  She can alternate use of ice compress and moist heat therapy to the affected area of her lumbar spine.    Vital signs demonstrate hemodynamic stability.  Blood pressure is at goal.      Continue current antiepileptic treatment regimen.    Utilize supportive care for mobilization/transfer assistance.    Surveillance labs when needed.                [x]  Discussed Patient in detail with nursing/staff, addressed all needs today.     [x]  Plan of Care Reviewed   []  PT/OT Reviewed   []  Order Changes  []  Discharge Plans Reviewed   []  Code Status Changes     I spent 30 minutes caring for Viviane on this date of service. This time includes time spent by me in the following activities:preparing for the visit, reviewing tests, performing a medically appropriate examination and/or evaluation , counseling and educating the patient/family/caregiver, ordering medications, tests, or procedures, documenting information in the medical record, and care coordination    I have reviewed and updated all copied forward information, as appropriate.  I attest to the accuracy and relevance of any unchanged information.    Luigi Urena DO  12/20/2023

## 2024-02-21 ENCOUNTER — NURSING HOME (OUTPATIENT)
Dept: FAMILY MEDICINE CLINIC | Facility: CLINIC | Age: 69
End: 2024-02-21
Payer: MEDICARE

## 2024-02-21 VITALS
OXYGEN SATURATION: 97 % | SYSTOLIC BLOOD PRESSURE: 106 MMHG | HEART RATE: 81 BPM | BODY MASS INDEX: 34.31 KG/M2 | DIASTOLIC BLOOD PRESSURE: 50 MMHG | RESPIRATION RATE: 20 BRPM | WEIGHT: 187.6 LBS | TEMPERATURE: 97.4 F

## 2024-02-21 DIAGNOSIS — E78.2 MIXED HYPERLIPIDEMIA: ICD-10-CM

## 2024-02-21 DIAGNOSIS — M17.0 PRIMARY OSTEOARTHRITIS OF BOTH KNEES: ICD-10-CM

## 2024-02-21 DIAGNOSIS — G40.309 NONINTRACTABLE GENERALIZED IDIOPATHIC EPILEPSY WITHOUT STATUS EPILEPTICUS: Chronic | ICD-10-CM

## 2024-02-21 DIAGNOSIS — Z74.09 IMPAIRED MOBILITY AND ADLS: Primary | Chronic | ICD-10-CM

## 2024-02-21 DIAGNOSIS — Z78.9 IMPAIRED MOBILITY AND ADLS: Primary | Chronic | ICD-10-CM

## 2024-03-06 NOTE — PROGRESS NOTES
Nursing Home Progress Note        Luigi Urena DO [x]  LATONIA Tony []  [address] [place] [address]  Phone: [phone]  Fax: [phone] Marcell Christina MD []  Rm Tran DO []  [address] [place] [address]  Phone: [phone]  Fax: [phone]     PATIENT NAME: Viviane Peñaloza                                                                          YOB: 1955           DATE OF SERVICE: 2/21/2024  FACILITY: []  [place]  [] [place]  [x]  [facility]  [] [facility]  []  Other ______________________________________________________________________     CHIEF COMPLAINT:   Chronic medical management and long-term care/seizure activity      HISTORY OF PRESENT ILLNESS:   [x]  Follow Up visit for coordination of long term care issues and chronic medical management of Diagnoses and all orders for this visit:    1. Impaired mobility and ADLs (Primary)    2. Nonintractable generalized idiopathic epilepsy without status epilepticus    3. Primary osteoarthritis of both knees    4. Mixed hyperlipidemia    Patient remains pleasant and interactive during questioning/examination.  Nursing/staff reports no new falls or injuries.  No reports of any nutritional/hydration deficits.    No seizure-like activity since last visit.    Vital signs have been stable.    Pain has appeared clinically well-controlled.    Patient would like to get her shingles vaccine series.      PAST MEDICAL & SURGICAL HISTORY:   Past Medical History:   Diagnosis Date    Anemia     Anxiety     Ascorbic acid deficiency     Depression     Epilepsy     Fracture of unspecified phalanx of left index finger, initial encounter for closed fracture 01/16/2019    Fracture, clavicle     left     Hyperlipidemia     Hypertension     Need for assistance with personal care     Obesity     Presence of artificial knee joint, left     Presence of right artificial knee joint     Repeated falls     Shoulder fracture, left      Vitamin D deficiency       Past Surgical History:   Procedure Laterality Date    OOPHORECTOMY           MEDICATIONS:  I have reviewed and reconciled the patients medication list in the patients chart at the skilled nursing facility today.      ALLERGIES:    Allergies   Allergen Reactions    Tramadol Other (See Comments)         SOCIAL HISTORY:    Social History     Socioeconomic History    Marital status: Single   Tobacco Use    Smoking status: Never    Smokeless tobacco: Never   Substance and Sexual Activity    Alcohol use: No    Drug use: No    Sexual activity: Defer       FAMILY HISTORY:    Family History   Problem Relation Age of Onset    Diabetes Mother     Heart disease Mother     Hypertension Mother     Hypertension Father     Diabetes Sister     Hypertension Sister        REVIEW OF SYSTEMS:    Review of Systems  Appetite: Fair []   Good [x]   Poor []   Weight Loss []  [x]  Weight Stable   Unavoidable Weight Loss []  Tolerating Tube Feeding []    Supplements Provided []   Constitutional: Negative for fever, chills, diaphoresis or fatigue and weight change.   HENT: No dysphagia; no changes to vision/hearing/smell/taste; no epistaxis  Eyes: Negative for redness and visual disturbance.   Respiratory: Negative for shortness of breath, chest pain, cough or chest tightness.   Cardiovascular: Negative for chest pain and palpitations.   Gastrointestinal: Negative for abdominal distention, abdominal pain and blood in stool.   Endocrine: Negative for cold intolerance and heat intolerance.   Genitourinary: Negative for difficulty urinating, dysuria and frequency.Negative for hematuria   Musculoskeletal: Chronic myalgias and arthralgias, worse to the left knee but improving.  Left foot as per above.  Integumentary: Postsurgical scarring noted to the left knee.  No drainage or streaking erythema.  Neurological: Negative for syncope, weakness and headaches.   Hematological: Negative for adenopathy. Does not bruise/bleed  easily.   Immunological: Negative for reported allergies or immunological disorders  Psychological: No acute behavioral changes    PHYSICAL EXAMINATION:   VITAL SIGNS:   Vitals:    02/21/24 0841   BP: 106/50   Pulse: 81   Resp: 20   Temp: 97.4 °F (36.3 °C)   SpO2: 97%       Physical Exam      General Appearance:  [x]  Alert   [x]  Oriented x person  [x]  No acute distress     []  Confused  []  Disoriented   []  Comatose   Head:  Atraumatic and normocephalic, without obvious abnormality.   Eyes:         PERRLA, conjunctivae and sclerae normal, no Icterus. No pallor. Extra-occular movements are within normal limits.   Ears:  Ears appear intact with no abnormalities noted.   Throat: No oral lesions, no thrush, oral mucosa moist.   Neck: Supple, trachea midline, no thyromegaly, no carotid bruit.   Back:   No kyphoscoliosis. No tenderness to palpation.   Lungs:   Chest shape is normal. Breath sounds heard bilaterally equally.  No wheezing.    Audible air exchange noted all lung fields.   Heart:  Normal S1 and S2, no murmur, no gallop, no rub. No JVD.   Abdomen:   Normal bowel sounds, no masses, no organomegaly. Soft, non-tender, non-distended, no guarding.  Mild truncal obesity.   Extremities: Moves all extremities; mild gravity dependent edema of bilateral lower extremities, without cyanosis or clubbing.    Poor core strength and stability.   Requires assistance with transfers at times.  Utilizes walker for assistance with ambulation.  Quadriceps mechanism intact.  Johnathan/Nguyen test negative.  Symmetric and appropriate dorsiflexion and plantar flexion of bilateral feet.  Pain on palpation of the lateral dorsal foot.   Pulses: Pulses palpable and equal bilaterally.   Skin: No bleeding or rash.  Generalized dry skin noted.  Age-related atrophy of skin.  No lesions or rashes.   Neurologic: [x] Normal speech []  Normal mental status    [x] Cranial nerves II through XII intact   [x]  No anosmia [x]  DTR 2+ [x]   Proprioception intact  [x]  No focal motor/sensory deficits      Psych/Mood:                    [x]  No acute changes []  Depressed  Urinary:                            [x]  Continent  []  Incontinent []  Retention  []  F/C      []  UTI w/treatment in progress           ASSESSMENT     Diagnoses and all orders for this visit:    1. Impaired mobility and ADLs (Primary)    2. Nonintractable generalized idiopathic epilepsy without status epilepticus    3. Primary osteoarthritis of both knees    4. Mixed hyperlipidemia          PLAN  Continue supportive care as needed for any mobilization/transfer assistance, as well as ADLs.  Continue to follow her nutritional/hydration status, no deficits reported at this time.    Pain appears clinically well-controlled.    Vital signs demonstrate hemodynamic stability.    Continue current antiepileptic treatment regimen.    I have notified staff of patient's desire to receive the shingles vaccine series.  Patient verbalized understanding that this is a 2 vaccine series.    Surveillance labs when needed.            [x]  Discussed Patient in detail with nursing/staff, addressed all needs today.     [x]  Plan of Care Reviewed   []  PT/OT Reviewed   []  Order Changes  []  Discharge Plans Reviewed   []  Code Status Changes     I spent 35 minutes caring for Viviane on this date of service. This time includes time spent by me in the following activities:preparing for the visit, performing a medically appropriate examination and/or evaluation , counseling and educating the patient/family/caregiver, ordering medications, tests, or procedures, documenting information in the medical record, and care coordination    I have reviewed and updated all copied forward information, as appropriate.  I attest to the accuracy and relevance of any unchanged information.    Luigi Urena DO  2/21/2024

## 2024-03-20 ENCOUNTER — NURSING HOME (OUTPATIENT)
Dept: FAMILY MEDICINE CLINIC | Facility: CLINIC | Age: 69
End: 2024-03-20
Payer: MEDICARE

## 2024-03-20 VITALS
DIASTOLIC BLOOD PRESSURE: 68 MMHG | TEMPERATURE: 97.4 F | OXYGEN SATURATION: 95 % | SYSTOLIC BLOOD PRESSURE: 124 MMHG | HEART RATE: 65 BPM | RESPIRATION RATE: 18 BRPM | WEIGHT: 190 LBS | BODY MASS INDEX: 34.75 KG/M2

## 2024-03-20 DIAGNOSIS — Z74.09 IMPAIRED MOBILITY AND ADLS: Chronic | ICD-10-CM

## 2024-03-20 DIAGNOSIS — E55.9 VITAMIN D DEFICIENCY: Primary | ICD-10-CM

## 2024-03-20 DIAGNOSIS — Z23 NEED FOR SHINGLES VACCINE: ICD-10-CM

## 2024-03-20 DIAGNOSIS — Z78.9 IMPAIRED MOBILITY AND ADLS: Chronic | ICD-10-CM

## 2024-03-20 NOTE — LETTER
Nursing Home Follow Up Note      Luigi Urena DO []   LATONIA Tony [x]  [address] [place] [address]  Phone: [phone]  Fax: [phone] Marcell Christina MD []    Rm Tran DO []   [address] [place] [address]  Phone: [phone]  Fax: [phone]     PATIENT NAME: Viviane Peñaloza                                                                          YOB: 1955           DATE OF SERVICE: 3/20/2024  FACILITY:  [][place]   [] [place]   [x] [facility]   [] [facility]   [] Other ______________________________________________________________________      CHIEF COMPLAINT:    Follow-up abnormal lab results.     Requesting Shingles vaccination.       HISTORY OF PRESENT ILLNESS:     Routine labs obtained today and Vitamin D low at 27. Other labs insignificant. She has not complaints today, reports she is doing well.  No signs and symptoms of any distress.    Requesting today to have Shingles vaccination. Has not had any in the past.     PAST MEDICAL & SURGICAL HISTORY:   Past Medical History:   Diagnosis Date    Anemia     Anxiety     Ascorbic acid deficiency     Depression     Epilepsy     Fracture of unspecified phalanx of left index finger, initial encounter for closed fracture 01/16/2019    Fracture, clavicle     left     Hyperlipidemia     Hypertension     Need for assistance with personal care     Obesity     Presence of artificial knee joint, left     Presence of right artificial knee joint     Repeated falls     Shoulder fracture, left     Vitamin D deficiency       Past Surgical History:   Procedure Laterality Date    OOPHORECTOMY           MEDICATIONS:  I have reviewed and reconciled the patients medication list in the patients chart at the skilled nursing facility today.      ALLERGIES:    Allergies   Allergen Reactions    Tramadol Other (See Comments)         SOCIAL HISTORY:    Social History     Socioeconomic History    Marital status: Single   Tobacco  Use    Smoking status: Never    Smokeless tobacco: Never   Substance and Sexual Activity    Alcohol use: No    Drug use: No    Sexual activity: Defer       FAMILY HISTORY:    Family History   Problem Relation Age of Onset    Diabetes Mother     Heart disease Mother     Hypertension Mother     Hypertension Father     Diabetes Sister     Hypertension Sister        REVIEW OF SYSTEMS:    Review of Systems   Constitutional:  Negative for activity change, appetite change, chills, diaphoresis, fatigue, fever, unexpected weight gain and unexpected weight loss.   HENT:  Negative for congestion, mouth sores, nosebleeds and trouble swallowing.    Eyes:  Negative for visual disturbance.   Respiratory:  Negative for cough, choking, chest tightness, shortness of breath and wheezing.    Cardiovascular:  Negative for chest pain, palpitations and leg swelling.   Gastrointestinal:  Negative for abdominal distention, abdominal pain, blood in stool, constipation, diarrhea, nausea, vomiting, GERD and indigestion.   Genitourinary:  Negative for decreased urine volume, difficulty urinating, dysuria, flank pain, frequency, hematuria, urgency and urinary incontinence.   Musculoskeletal:  Positive for arthralgias (chronic), back pain (chronic) and gait problem. Negative for joint swelling and myalgias.   Skin:  Negative for color change, pallor and rash.   Neurological:  Positive for weakness and memory problem. Negative for dizziness and confusion.   Psychiatric/Behavioral:  Negative for behavioral problems, dysphoric mood, sleep disturbance and depressed mood. The patient is not nervous/anxious.          PHYSICAL EXAMINATION:   VITAL SIGNS:   Vitals:    03/20/24 1502   BP: 124/68   Pulse: 65   Resp: 18   Temp: 97.4 °F (36.3 °C)   SpO2: 95%   Weight: 86.2 kg (190 lb)        Physical Exam  Vitals and nursing note reviewed.   Constitutional:       Appearance: She is well-developed.   Cardiovascular:      Rate and Rhythm: Normal rate.    Pulmonary:      Effort: Pulmonary effort is normal. No respiratory distress.   Musculoskeletal:      Comments: LE weakness   Skin:     Findings: No rash.   Neurological:      General: No focal deficit present.      Mental Status: She is alert and oriented to person, place, and time.   Psychiatric:         Mood and Affect: Mood normal.         Behavior: Behavior normal.         Cognition and Memory: Cognition and memory normal.         RECORDS REVIEW:   I have reviewed records in [facility]    ASSESSMENT     Diagnoses and all orders for this visit:    1. Vitamin D deficiency (Primary)    2. Need for shingles vaccine    3. Impaired mobility and ADLs        PLAN    Vitamin D def  -Vitamin D 1000 units daily. Will check level in 3 months and follow up with results.     Need for shingles vaccine  -Shingrix 0.5 ml now and repeat in 2 months.     Nursing to continue supportive care for all ADLs.    Patient was encouraged to keep me informed of any acute changes, lack of improvement, or any new concerning symptoms.  Patient voiced understanding of all instructions and denied further questions.     Will follow up and continue to monitor.     [x]  Discussed Patient in detail with nursing/staff, addressed all needs today.     [x]  Plan of Care Reviewed   [x]  PT/OT Reviewed   [x]  Order Changes  [x]  Discharge Plans Reviewed  [x]  Advance Directive on file with Nursing Home.   [x]  POA on file with Nursing Home.   [x]  Code Status listed: [x]  Full Code   []  DNR     “I confirm accuracy of unchanged data/findings which have been carried forward from previous visit, as well as I have updated appropriately those that have changed.”       Marlene Garcia, APRN.

## 2024-03-21 NOTE — PROGRESS NOTES
Nursing Home Follow Up Note      Luigi Urena DO []   LATONIA Tony [x]  [address] [place] [address]  Phone: [phone]  Fax: [phone] Marcell Christina MD []    Rm Tran DO []   [address] [place] [address]  Phone: [phone]  Fax: [phone]     PATIENT NAME: Viviane Peñaloza                                                                          YOB: 1955           DATE OF SERVICE: 3/20/2024  FACILITY:  [][place]   [] [place]   [x] [facility]   [] [facility]   [] Other ______________________________________________________________________      CHIEF COMPLAINT:    Follow-up abnormal lab results.     Requesting Shingles vaccination.       HISTORY OF PRESENT ILLNESS:     Routine labs obtained today and Vitamin D low at 27. Other labs insignificant. She has not complaints today, reports she is doing well.  No signs and symptoms of any distress.    Requesting today to have Shingles vaccination. Has not had any in the past.     PAST MEDICAL & SURGICAL HISTORY:   Past Medical History:   Diagnosis Date    Anemia     Anxiety     Ascorbic acid deficiency     Depression     Epilepsy     Fracture of unspecified phalanx of left index finger, initial encounter for closed fracture 01/16/2019    Fracture, clavicle     left     Hyperlipidemia     Hypertension     Need for assistance with personal care     Obesity     Presence of artificial knee joint, left     Presence of right artificial knee joint     Repeated falls     Shoulder fracture, left     Vitamin D deficiency       Past Surgical History:   Procedure Laterality Date    OOPHORECTOMY           MEDICATIONS:  I have reviewed and reconciled the patients medication list in the patients chart at the skilled nursing facility today.      ALLERGIES:    Allergies   Allergen Reactions    Tramadol Other (See Comments)         SOCIAL HISTORY:    Social History     Socioeconomic History    Marital status: Single   Tobacco  Use    Smoking status: Never    Smokeless tobacco: Never   Substance and Sexual Activity    Alcohol use: No    Drug use: No    Sexual activity: Defer       FAMILY HISTORY:    Family History   Problem Relation Age of Onset    Diabetes Mother     Heart disease Mother     Hypertension Mother     Hypertension Father     Diabetes Sister     Hypertension Sister        REVIEW OF SYSTEMS:    Review of Systems   Constitutional:  Negative for activity change, appetite change, chills, diaphoresis, fatigue, fever, unexpected weight gain and unexpected weight loss.   HENT:  Negative for congestion, mouth sores, nosebleeds and trouble swallowing.    Eyes:  Negative for visual disturbance.   Respiratory:  Negative for cough, choking, chest tightness, shortness of breath and wheezing.    Cardiovascular:  Negative for chest pain, palpitations and leg swelling.   Gastrointestinal:  Negative for abdominal distention, abdominal pain, blood in stool, constipation, diarrhea, nausea, vomiting, GERD and indigestion.   Genitourinary:  Negative for decreased urine volume, difficulty urinating, dysuria, flank pain, frequency, hematuria, urgency and urinary incontinence.   Musculoskeletal:  Positive for arthralgias (chronic), back pain (chronic) and gait problem. Negative for joint swelling and myalgias.   Skin:  Negative for color change, pallor and rash.   Neurological:  Positive for weakness and memory problem. Negative for dizziness and confusion.   Psychiatric/Behavioral:  Negative for behavioral problems, dysphoric mood, sleep disturbance and depressed mood. The patient is not nervous/anxious.          PHYSICAL EXAMINATION:   VITAL SIGNS:   Vitals:    03/20/24 1502   BP: 124/68   Pulse: 65   Resp: 18   Temp: 97.4 °F (36.3 °C)   SpO2: 95%   Weight: 86.2 kg (190 lb)        Physical Exam  Vitals and nursing note reviewed.   Constitutional:       Appearance: She is well-developed.   Cardiovascular:      Rate and Rhythm: Normal rate.    Pulmonary:      Effort: Pulmonary effort is normal. No respiratory distress.   Musculoskeletal:      Comments: LE weakness   Skin:     Findings: No rash.   Neurological:      General: No focal deficit present.      Mental Status: She is alert and oriented to person, place, and time.   Psychiatric:         Mood and Affect: Mood normal.         Behavior: Behavior normal.         Cognition and Memory: Cognition and memory normal.         RECORDS REVIEW:   I have reviewed records in [facility]    ASSESSMENT     Diagnoses and all orders for this visit:    1. Vitamin D deficiency (Primary)    2. Need for shingles vaccine    3. Impaired mobility and ADLs        PLAN    Vitamin D def  -Vitamin D 1000 units daily. Will check level in 3 months and follow up with results.     Need for shingles vaccine  -Shingrix 0.5 ml now and repeat in 2 months.     Nursing to continue supportive care for all ADLs.    Patient was encouraged to keep me informed of any acute changes, lack of improvement, or any new concerning symptoms.  Patient voiced understanding of all instructions and denied further questions.     Will follow up and continue to monitor.     [x]  Discussed Patient in detail with nursing/staff, addressed all needs today.     [x]  Plan of Care Reviewed   [x]  PT/OT Reviewed   [x]  Order Changes  [x]  Discharge Plans Reviewed  [x]  Advance Directive on file with Nursing Home.   [x]  POA on file with Nursing Home.   [x]  Code Status listed: [x]  Full Code   []  DNR     “I confirm accuracy of unchanged data/findings which have been carried forward from previous visit, as well as I have updated appropriately those that have changed.”       Marlene Garcia, APRN.

## 2024-04-03 ENCOUNTER — NURSING HOME (OUTPATIENT)
Dept: FAMILY MEDICINE CLINIC | Facility: CLINIC | Age: 69
End: 2024-04-03
Payer: MEDICARE

## 2024-04-03 VITALS
BODY MASS INDEX: 34.75 KG/M2 | OXYGEN SATURATION: 95 % | DIASTOLIC BLOOD PRESSURE: 68 MMHG | RESPIRATION RATE: 18 BRPM | TEMPERATURE: 97.6 F | SYSTOLIC BLOOD PRESSURE: 124 MMHG | WEIGHT: 190 LBS | HEART RATE: 70 BPM

## 2024-04-03 DIAGNOSIS — Z79.82 ASPIRIN LONG-TERM USE: ICD-10-CM

## 2024-04-03 DIAGNOSIS — R23.3 BRUISING, SPONTANEOUS: Primary | ICD-10-CM

## 2024-04-03 DIAGNOSIS — Z74.09 IMPAIRED MOBILITY AND ADLS: Chronic | ICD-10-CM

## 2024-04-03 DIAGNOSIS — Z78.9 IMPAIRED MOBILITY AND ADLS: Chronic | ICD-10-CM

## 2024-04-03 NOTE — LETTER
Nursing Home Follow Up Note      Luigi Urena DO []   LATONIA Tony [x]  [address] [place] [address]  Phone: [phone]  Fax: [phone] Marcell Christina MD []    Rm Tran DO []   [address] [place] [address]  Phone: [phone]  Fax: [phone]     PATIENT NAME: Viviane Peñaloza                                                                          YOB: 1955           DATE OF SERVICE: 4/3/2024  FACILITY:  [][facility]   [] [facility]   [x] [facility]   [] [facility]   [] Other ______________________________________________________________________      CHIEF COMPLAINT:    Complaints of easy bruising for the past few days.       HISTORY OF PRESENT ILLNESS:     Patient has complaints today of random bruising on toes, arm and leg over the past couple days.  She does not know how she got the bruises.  She denies hitting extremities on any objects and reports that the bruising is just appearing randomly.  She is on daily aspirin. No other complaints or signs and symptoms of any distress.     PAST MEDICAL & SURGICAL HISTORY:   Past Medical History:   Diagnosis Date    Anemia     Anxiety     Ascorbic acid deficiency     Depression     Epilepsy     Fracture of unspecified phalanx of left index finger, initial encounter for closed fracture 01/16/2019    Fracture, clavicle     left     Hyperlipidemia     Hypertension     Need for assistance with personal care     Obesity     Presence of artificial knee joint, left     Presence of right artificial knee joint     Repeated falls     Shoulder fracture, left     Vitamin D deficiency       Past Surgical History:   Procedure Laterality Date    OOPHORECTOMY           MEDICATIONS:  I have reviewed and reconciled the patients medication list in the patients chart at the skilled nursing facility today.      ALLERGIES:    Allergies   Allergen Reactions    Tramadol Other (See Comments)         SOCIAL HISTORY:    Social History      Socioeconomic History    Marital status: Single   Tobacco Use    Smoking status: Never    Smokeless tobacco: Never   Substance and Sexual Activity    Alcohol use: No    Drug use: No    Sexual activity: Defer       FAMILY HISTORY:    Family History   Problem Relation Age of Onset    Diabetes Mother     Heart disease Mother     Hypertension Mother     Hypertension Father     Diabetes Sister     Hypertension Sister        REVIEW OF SYSTEMS:    Review of Systems   Constitutional:  Negative for activity change, appetite change, chills, diaphoresis, fatigue, fever, unexpected weight gain and unexpected weight loss.   HENT:  Negative for congestion, mouth sores, nosebleeds and trouble swallowing.    Eyes:  Negative for visual disturbance.   Respiratory:  Negative for cough, chest tightness, shortness of breath and wheezing.    Cardiovascular:  Negative for chest pain, palpitations and leg swelling.   Gastrointestinal:  Negative for abdominal distention, abdominal pain, blood in stool, constipation, diarrhea, nausea, vomiting and indigestion.   Genitourinary:  Negative for decreased urine volume, difficulty urinating, dysuria, frequency, hematuria, urgency and urinary incontinence.   Musculoskeletal:  Positive for arthralgias (chronic), back pain (chronic) and gait problem. Negative for myalgias.   Skin:  Positive for bruise. Negative for pallor and rash.   Neurological:  Positive for weakness and memory problem. Negative for dizziness and confusion.   Psychiatric/Behavioral:  Negative for behavioral problems, dysphoric mood, sleep disturbance and depressed mood. The patient is not nervous/anxious.          PHYSICAL EXAMINATION:   VITAL SIGNS:   Vitals:    04/03/24 1520   BP: 124/68   Pulse: 70   Resp: 18   Temp: 97.6 °F (36.4 °C)   SpO2: 95%   Weight: 86.2 kg (190 lb)        Physical Exam  Vitals and nursing note reviewed.   Constitutional:       Appearance: She is well-developed.   Cardiovascular:      Rate and  Rhythm: Normal rate and regular rhythm.   Pulmonary:      Effort: Pulmonary effort is normal. No respiratory distress.   Musculoskeletal:      Comments: LE weakness   Skin:     Findings: No rash.      Comments: Bruise to right toes, arm and leg will obtain CBC and CMP.   Neurological:      General: No focal deficit present.      Mental Status: She is alert and oriented to person, place, and time.   Psychiatric:         Mood and Affect: Mood and affect normal.         Behavior: Behavior normal.         Cognition and Memory: Cognition and memory normal.         RECORDS REVIEW:   I have reviewed records in [facility]    ASSESSMENT     Diagnoses and all orders for this visit:    1. Bruising, spontaneous (Primary)    2. Aspirin long-term use    3. Impaired mobility and ADLs          PLAN    Spontaneous bruising/ASA use  -Will obtain CBC and CMP.  Will follow-up with results.  Did advise that aspirin can cause very easy bruising.    Nursing to continue supportive care for all ADLs.    Patient was encouraged to keep me informed of any acute changes, lack of improvement, or any new concerning symptoms.  Patient voiced understanding of all instructions and denied further questions.     Will follow up and continue to monitor.     [x]  Discussed Patient in detail with nursing/staff, addressed all needs today.     [x]  Plan of Care Reviewed   [x]  PT/OT Reviewed   [x]  Order Changes  [x]  Discharge Plans Reviewed  [x]  Advance Directive on file with Nursing Home.   [x]  POA on file with Nursing Home.   [x]  Code Status listed: [x]  Full Code   []  DNR     “I confirm accuracy of unchanged data/findings which have been carried forward from previous visit, as well as I have updated appropriately those that have changed.”       Marlene Garcia, APRN.

## 2024-04-04 NOTE — PROGRESS NOTES
Nursing Home Follow Up Note      Luigi Urena DO []   LATONIA Tony [x]  [address] [place] [address]  Phone: [phone]  Fax: [phone] Marcell Christina MD []    Rm Tran DO []   [address] [place] [address]  Phone: [phone]  Fax: [phone]     PATIENT NAME: Viviane Peñaloza                                                                          YOB: 1955           DATE OF SERVICE: 4/3/2024  FACILITY:  [][facility]   [] [facility]   [x] [facility]   [] [facility]   [] Other ______________________________________________________________________      CHIEF COMPLAINT:    Complaints of easy bruising for the past few days.       HISTORY OF PRESENT ILLNESS:     Patient has complaints today of random bruising on toes, arm and leg over the past couple days.  She does not know how she got the bruises.  She denies hitting extremities on any objects and reports that the bruising is just appearing randomly.  She is on daily aspirin. No other complaints or signs and symptoms of any distress.     PAST MEDICAL & SURGICAL HISTORY:   Past Medical History:   Diagnosis Date    Anemia     Anxiety     Ascorbic acid deficiency     Depression     Epilepsy     Fracture of unspecified phalanx of left index finger, initial encounter for closed fracture 01/16/2019    Fracture, clavicle     left     Hyperlipidemia     Hypertension     Need for assistance with personal care     Obesity     Presence of artificial knee joint, left     Presence of right artificial knee joint     Repeated falls     Shoulder fracture, left     Vitamin D deficiency       Past Surgical History:   Procedure Laterality Date    OOPHORECTOMY           MEDICATIONS:  I have reviewed and reconciled the patients medication list in the patients chart at the skilled nursing facility today.      ALLERGIES:    Allergies   Allergen Reactions    Tramadol Other (See Comments)         SOCIAL HISTORY:    Social History      Socioeconomic History    Marital status: Single   Tobacco Use    Smoking status: Never    Smokeless tobacco: Never   Substance and Sexual Activity    Alcohol use: No    Drug use: No    Sexual activity: Defer       FAMILY HISTORY:    Family History   Problem Relation Age of Onset    Diabetes Mother     Heart disease Mother     Hypertension Mother     Hypertension Father     Diabetes Sister     Hypertension Sister        REVIEW OF SYSTEMS:    Review of Systems   Constitutional:  Negative for activity change, appetite change, chills, diaphoresis, fatigue, fever, unexpected weight gain and unexpected weight loss.   HENT:  Negative for congestion, mouth sores, nosebleeds and trouble swallowing.    Eyes:  Negative for visual disturbance.   Respiratory:  Negative for cough, chest tightness, shortness of breath and wheezing.    Cardiovascular:  Negative for chest pain, palpitations and leg swelling.   Gastrointestinal:  Negative for abdominal distention, abdominal pain, blood in stool, constipation, diarrhea, nausea, vomiting and indigestion.   Genitourinary:  Negative for decreased urine volume, difficulty urinating, dysuria, frequency, hematuria, urgency and urinary incontinence.   Musculoskeletal:  Positive for arthralgias (chronic), back pain (chronic) and gait problem. Negative for myalgias.   Skin:  Positive for bruise. Negative for pallor and rash.   Neurological:  Positive for weakness and memory problem. Negative for dizziness and confusion.   Psychiatric/Behavioral:  Negative for behavioral problems, dysphoric mood, sleep disturbance and depressed mood. The patient is not nervous/anxious.          PHYSICAL EXAMINATION:   VITAL SIGNS:   Vitals:    04/03/24 1520   BP: 124/68   Pulse: 70   Resp: 18   Temp: 97.6 °F (36.4 °C)   SpO2: 95%   Weight: 86.2 kg (190 lb)        Physical Exam  Vitals and nursing note reviewed.   Constitutional:       Appearance: She is well-developed.   Cardiovascular:      Rate and  Rhythm: Normal rate and regular rhythm.   Pulmonary:      Effort: Pulmonary effort is normal. No respiratory distress.   Musculoskeletal:      Comments: LE weakness   Skin:     Findings: No rash.      Comments: Bruise to right toes, arm and leg will obtain CBC and CMP.   Neurological:      General: No focal deficit present.      Mental Status: She is alert and oriented to person, place, and time.   Psychiatric:         Mood and Affect: Mood and affect normal.         Behavior: Behavior normal.         Cognition and Memory: Cognition and memory normal.         RECORDS REVIEW:   I have reviewed records in [facility]    ASSESSMENT     Diagnoses and all orders for this visit:    1. Bruising, spontaneous (Primary)    2. Aspirin long-term use    3. Impaired mobility and ADLs          PLAN    Spontaneous bruising/ASA use  -Will obtain CBC and CMP.  Will follow-up with results.  Did advise that aspirin can cause very easy bruising.    Nursing to continue supportive care for all ADLs.    Patient was encouraged to keep me informed of any acute changes, lack of improvement, or any new concerning symptoms.  Patient voiced understanding of all instructions and denied further questions.     Will follow up and continue to monitor.     [x]  Discussed Patient in detail with nursing/staff, addressed all needs today.     [x]  Plan of Care Reviewed   [x]  PT/OT Reviewed   [x]  Order Changes  [x]  Discharge Plans Reviewed  [x]  Advance Directive on file with Nursing Home.   [x]  POA on file with Nursing Home.   [x]  Code Status listed: [x]  Full Code   []  DNR     “I confirm accuracy of unchanged data/findings which have been carried forward from previous visit, as well as I have updated appropriately those that have changed.”       Marlene Garcia, APRN.

## 2024-04-12 RX ORDER — PREGABALIN 100 MG/1
100 CAPSULE ORAL 2 TIMES DAILY
Qty: 60 CAPSULE | Refills: 5 | Status: SHIPPED | OUTPATIENT
Start: 2024-04-12

## 2024-04-17 ENCOUNTER — NURSING HOME (OUTPATIENT)
Dept: FAMILY MEDICINE CLINIC | Facility: CLINIC | Age: 69
End: 2024-04-17
Payer: MEDICARE

## 2024-04-17 VITALS
RESPIRATION RATE: 18 BRPM | DIASTOLIC BLOOD PRESSURE: 76 MMHG | BODY MASS INDEX: 34.93 KG/M2 | WEIGHT: 191 LBS | OXYGEN SATURATION: 95 % | TEMPERATURE: 97.7 F | SYSTOLIC BLOOD PRESSURE: 128 MMHG | HEART RATE: 70 BPM

## 2024-04-17 DIAGNOSIS — Z78.9 IMPAIRED MOBILITY AND ADLS: Primary | Chronic | ICD-10-CM

## 2024-04-17 DIAGNOSIS — Z74.09 IMPAIRED MOBILITY AND ADLS: Primary | Chronic | ICD-10-CM

## 2024-04-17 DIAGNOSIS — G40.309 NONINTRACTABLE GENERALIZED IDIOPATHIC EPILEPSY WITHOUT STATUS EPILEPTICUS: Chronic | ICD-10-CM

## 2024-04-17 DIAGNOSIS — M25.562 ARTHRALGIA OF BOTH KNEES: ICD-10-CM

## 2024-04-17 DIAGNOSIS — E55.9 VITAMIN D DEFICIENCY: ICD-10-CM

## 2024-04-17 DIAGNOSIS — M17.0 PRIMARY OSTEOARTHRITIS OF BOTH KNEES: ICD-10-CM

## 2024-04-17 DIAGNOSIS — M25.561 ARTHRALGIA OF BOTH KNEES: ICD-10-CM

## 2024-04-17 PROCEDURE — 99309 SBSQ NF CARE MODERATE MDM 30: CPT | Performed by: FAMILY MEDICINE

## 2024-04-17 NOTE — PROGRESS NOTES
Nursing Home Progress Note        Luigi Urena DO [x]  LATONIA Tony []  [address] [place] [address]  Phone: [phone]  Fax: [phone] Marcell Christina MD []  Rm Tran DO []  [address] [place] [address]  Phone: [phone]  Fax: [phone]     PATIENT NAME: Viviane Peñaloza                                                                          YOB: 1955           DATE OF SERVICE: 4/17/2024  FACILITY: []  [facility]  [] [facility]  [x]  [facility]  [] [facility]  []  Other ______________________________________________________________________     CHIEF COMPLAINT:   Chronic medical management and long-term care/seizure activity      HISTORY OF PRESENT ILLNESS:   [x]  Follow Up visit for coordination of long term care issues and chronic medical management of Diagnoses and all orders for this visit:    1. Impaired mobility and ADLs (Primary)    2. Nonintractable generalized idiopathic epilepsy without status epilepticus    3. Primary osteoarthritis of both knees    4. Arthralgia of both knees    5. Vitamin D deficiency    Patient remains pleasant/interactive during questioning, nursing/staff reports no new falls or injuries.  Patient remains receptive to supportive care.    No new falls or injuries reported.    No seizure-like activity since last visit.    Vital signs have been stable.  No nutritional/hydration deficits reported.      PAST MEDICAL & SURGICAL HISTORY:   Past Medical History:   Diagnosis Date    Anemia     Anxiety     Ascorbic acid deficiency     Depression     Epilepsy     Fracture of unspecified phalanx of left index finger, initial encounter for closed fracture 01/16/2019    Fracture, clavicle     left     Hyperlipidemia     Hypertension     Need for assistance with personal care     Obesity     Presence of artificial knee joint, left     Presence of right artificial knee joint     Repeated falls     Shoulder fracture, left     Vitamin D  deficiency       Past Surgical History:   Procedure Laterality Date    OOPHORECTOMY           MEDICATIONS:  I have reviewed and reconciled the patients medication list in the patients chart at the skilled nursing facility today.      ALLERGIES:    Allergies   Allergen Reactions    Tramadol Other (See Comments)         SOCIAL HISTORY:    Social History     Socioeconomic History    Marital status: Single   Tobacco Use    Smoking status: Never    Smokeless tobacco: Never   Substance and Sexual Activity    Alcohol use: No    Drug use: No    Sexual activity: Defer       FAMILY HISTORY:    Family History   Problem Relation Age of Onset    Diabetes Mother     Heart disease Mother     Hypertension Mother     Hypertension Father     Diabetes Sister     Hypertension Sister        REVIEW OF SYSTEMS:    Review of Systems  Appetite: Fair []   Good [x]   Poor []   Weight Loss []  [x]  Weight Stable   Unavoidable Weight Loss []  Tolerating Tube Feeding []    Supplements Provided []   Constitutional: Negative for fever, chills, diaphoresis or fatigue and weight change.   HENT: No dysphagia; no changes to vision/hearing/smell/taste; no epistaxis  Eyes: Negative for redness and visual disturbance.   Respiratory: Negative for shortness of breath, chest pain, cough or chest tightness.   Cardiovascular: Negative for chest pain and palpitations.   Gastrointestinal: Negative for abdominal distention, abdominal pain and blood in stool.   Endocrine: Negative for cold intolerance and heat intolerance.   Genitourinary: Negative for difficulty urinating, dysuria and frequency.Negative for hematuria   Musculoskeletal: Chronic myalgias and arthralgias, worse to the left knee but improving.  Left foot as per above.  Integumentary: Postsurgical scarring noted to the left knee.  No drainage or streaking erythema.  Neurological: Negative for syncope, weakness and headaches.   Hematological: Negative for adenopathy. Does not bruise/bleed easily.    Immunological: Negative for reported allergies or immunological disorders  Psychological: No acute behavioral changes    PHYSICAL EXAMINATION:   VITAL SIGNS:   Vitals:    04/17/24 0847   BP: 128/76   Pulse: 70   Resp: 18   Temp: 97.7 °F (36.5 °C)   SpO2: 95%       Physical Exam      General Appearance:  [x]  Alert   [x]  Oriented x person  [x]  No acute distress     []  Confused  []  Disoriented   []  Comatose   Head:  Atraumatic and normocephalic, without obvious abnormality.   Eyes:         PERRLA, conjunctivae and sclerae normal, no Icterus. No pallor. Extra-occular movements are within normal limits.   Ears:  Ears appear intact with no abnormalities noted.   Throat: No oral lesions, no thrush, oral mucosa moist.   Neck: Supple, trachea midline, no thyromegaly, no carotid bruit.   Back:   No kyphoscoliosis. No tenderness to palpation.   Lungs:   Chest shape is normal. Breath sounds heard bilaterally equally.  No wheezing.    Audible air exchange noted all lung fields.   Heart:  Normal S1 and S2, no murmur, no gallop, no rub. No JVD.   Abdomen:   Normal bowel sounds, no masses, no organomegaly. Soft, non-tender, non-distended, no guarding.  Mild truncal obesity.   Extremities: Moves all extremities; mild gravity dependent edema of bilateral lower extremities, without cyanosis or clubbing.    Poor core strength and stability.   Requires assistance with transfers at times.  Utilizes walker for assistance with ambulation.  Quadriceps mechanism intact.  Johnathan/Nguyen test negative.  Symmetric and appropriate dorsiflexion and plantar flexion of bilateral feet.  Pain on palpation of the lateral dorsal foot.   Pulses: Pulses palpable and equal bilaterally.   Skin: No bleeding or rash.  Generalized dry skin noted.  Age-related atrophy of skin.  No lesions or rashes.   Neurologic: [x] Normal speech []  Normal mental status    [x] Cranial nerves II through XII intact   [x]  No anosmia [x]  DTR 2+ [x]  Proprioception  intact  [x]  No focal motor/sensory deficits      Psych/Mood:                    [x]  No acute changes []  Depressed  Urinary:                            [x]  Continent  []  Incontinent []  Retention  []  F/C      []  UTI w/treatment in progress           ASSESSMENT     Diagnoses and all orders for this visit:    1. Impaired mobility and ADLs (Primary)    2. Nonintractable generalized idiopathic epilepsy without status epilepticus    3. Primary osteoarthritis of both knees    4. Arthralgia of both knees    5. Vitamin D deficiency          PLAN  Continue supportive care when needed for mobilization/transfers, fall precautions in place.  No nutritional/hydration deficits reported.    Pain appears clinically well-controlled.    Continue balanced dietary intake, natural sunlight exposure as best able.  Periodic vitamin D monitoring.    Continue current antiepileptic treatment regimen.    Vital signs demonstrate hemodynamic stability.    Surveillance labs as per routine/needed.          [x]  Discussed Patient in detail with nursing/staff, addressed all needs today.     [x]  Plan of Care Reviewed   []  PT/OT Reviewed   []  Order Changes  []  Discharge Plans Reviewed   []  Code Status Changes     I spent 35 minutes caring for Viviane on this date of service. This time includes time spent by me in the following activities:preparing for the visit, performing a medically appropriate examination and/or evaluation , counseling and educating the patient/family/caregiver, ordering medications, tests, or procedures, documenting information in the medical record, and care coordination    I have reviewed and updated all copied forward information, as appropriate.  I attest to the accuracy and relevance of any unchanged information.    Luigi Urena DO  4/17/2024

## 2024-05-13 RX ORDER — PHENOBARBITAL 64.8 MG/1
64.8 TABLET ORAL 2 TIMES DAILY
Qty: 60 TABLET | Refills: 5 | Status: SHIPPED | OUTPATIENT
Start: 2024-05-13

## 2024-05-13 RX ORDER — CLONAZEPAM 0.5 MG/1
0.5 TABLET ORAL 2 TIMES DAILY
Qty: 60 TABLET | Refills: 5 | Status: SHIPPED | OUTPATIENT
Start: 2024-05-13

## 2024-05-13 NOTE — TELEPHONE ENCOUNTER
[facility] REQUESTING MED REFILL FOR PHENOBARBITAL 64.8 MG AND CLONAZEPAM 0.5 MG.    DIRECTIONS: PHENOBARBITAL 64.8 MG 1 TAB PO BID.    CLONAZEPAM 0.5 MG 1 TAB PO BID.

## 2024-07-12 ENCOUNTER — NURSING HOME (OUTPATIENT)
Dept: FAMILY MEDICINE CLINIC | Facility: CLINIC | Age: 69
End: 2024-07-12
Payer: MEDICARE

## 2024-07-12 VITALS
BODY MASS INDEX: 34.82 KG/M2 | SYSTOLIC BLOOD PRESSURE: 116 MMHG | OXYGEN SATURATION: 94 % | TEMPERATURE: 97.8 F | WEIGHT: 190.4 LBS | DIASTOLIC BLOOD PRESSURE: 60 MMHG | HEART RATE: 58 BPM | RESPIRATION RATE: 18 BRPM

## 2024-07-12 DIAGNOSIS — Z74.09 IMPAIRED MOBILITY AND ADLS: Chronic | ICD-10-CM

## 2024-07-12 DIAGNOSIS — Z78.9 IMPAIRED MOBILITY AND ADLS: Chronic | ICD-10-CM

## 2024-07-12 DIAGNOSIS — N64.4 BREAST PAIN, RIGHT: ICD-10-CM

## 2024-07-12 PROCEDURE — 99308 SBSQ NF CARE LOW MDM 20: CPT | Performed by: NURSE PRACTITIONER

## 2024-07-12 RX ORDER — PREGABALIN 100 MG/1
100 CAPSULE ORAL 2 TIMES DAILY
Qty: 60 CAPSULE | Refills: 5 | Status: SHIPPED | OUTPATIENT
Start: 2024-07-12

## 2024-07-12 NOTE — LETTER
Nursing Home Follow Up Note      Luigi Urena DO []   LATONIA Tony [x]  [address] [place] [address]  Phone: [phone]  Fax: [phone] Marcell Christina MD []    Rm Tran DO []   [address] [place] [address]  Phone: [phone]  Fax: [phone]     PATIENT NAME: Viviane Peñaloza                                                                          YOB: 1955           DATE OF SERVICE: 7/12/2024  FACILITY:  [][facility]   [] [facility]   [x] [facility]   [] [facility]   [] Other ______________________________________________________________________      CHIEF COMPLAINT:    Complaints of right breast pain for the past couple days.       HISTORY OF PRESENT ILLNESS:     Patient with complaints of right breast pain, about 5 o'clock, for the past couple days. There is no mass or lesions, no redness or warmth. Reports that the breast is just sore to touch. Does not know of any injuries, but not sure if she bumped something or slept wrong. Had normal Mammogram in September 2023. Has not had this before. No other complaints, no signs and symptoms of any distress.     PAST MEDICAL & SURGICAL HISTORY:   Past Medical History:   Diagnosis Date    Anemia     Anxiety     Ascorbic acid deficiency     Depression     Epilepsy     Fracture of unspecified phalanx of left index finger, initial encounter for closed fracture 01/16/2019    Fracture, clavicle     left     Hyperlipidemia     Hypertension     Need for assistance with personal care     Obesity     Presence of artificial knee joint, left     Presence of right artificial knee joint     Repeated falls     Shoulder fracture, left     Vitamin D deficiency       Past Surgical History:   Procedure Laterality Date    OOPHORECTOMY           MEDICATIONS:  I have reviewed and reconciled the patients medication list in the patients chart at the skilled nursing facility today.      ALLERGIES:    Allergies   Allergen Reactions     Tramadol Other (See Comments)         SOCIAL HISTORY:    Social History     Socioeconomic History    Marital status: Single   Tobacco Use    Smoking status: Never    Smokeless tobacco: Never   Substance and Sexual Activity    Alcohol use: No    Drug use: No    Sexual activity: Defer       FAMILY HISTORY:    Family History   Problem Relation Age of Onset    Diabetes Mother     Heart disease Mother     Hypertension Mother     Hypertension Father     Diabetes Sister     Hypertension Sister        REVIEW OF SYSTEMS:    Review of Systems   Constitutional:  Negative for activity change, appetite change, chills, diaphoresis, fatigue, fever, unexpected weight gain and unexpected weight loss.   Eyes:  Negative for visual disturbance.   Respiratory:  Negative for cough, chest tightness, shortness of breath and wheezing.    Cardiovascular:  Negative for chest pain, palpitations and leg swelling.   Gastrointestinal:  Negative for abdominal pain, constipation, diarrhea, nausea, vomiting and indigestion.   Genitourinary:  Positive for breast pain (right). Negative for decreased urine volume, difficulty urinating, dysuria, frequency, hematuria, urgency and urinary incontinence.   Musculoskeletal:  Positive for arthralgias (chronic), back pain (chronic) and gait problem.   Skin:  Negative for pallor and rash.   Neurological:  Positive for weakness and memory problem. Negative for dizziness and confusion.   Psychiatric/Behavioral:  Negative for behavioral problems, dysphoric mood, sleep disturbance and depressed mood. The patient is not nervous/anxious.          PHYSICAL EXAMINATION:   VITAL SIGNS:   Vitals:    07/12/24 1020   BP: 116/60   Pulse: 58   Resp: 18   Temp: 97.8 °F (36.6 °C)   SpO2: 94%   Weight: 86.4 kg (190 lb 6.4 oz)        Physical Exam  Vitals and nursing note reviewed.   Constitutional:       Appearance: She is well-developed.   Cardiovascular:      Rate and Rhythm: Normal rate and regular rhythm.   Pulmonary:       Effort: Pulmonary effort is normal. No respiratory distress.   Chest:       Musculoskeletal:      Comments: LE weakness   Skin:     Findings: No rash.      Comments: Bruise to right toes, arm and leg will obtain CBC and CMP.   Neurological:      General: No focal deficit present.      Mental Status: She is alert and oriented to person, place, and time.   Psychiatric:         Mood and Affect: Mood and affect normal.         Behavior: Behavior normal.         Cognition and Memory: Cognition and memory normal.         RECORDS REVIEW:   I have reviewed records in [facility]    ASSESSMENT     Diagnoses and all orders for this visit:    1. Breast pain, right    2. Impaired mobility and ADLs            PLAN    Right breast pain  -US of right breast ordered to be performed here in facility but they do not perform here in facility unless mass noted. If pain continues, will schedule for outpatient US and do annual Mammogram if needed. Will follow up and continue to monitor.     Nursing to continue supportive care for all ADLs.    Patient was encouraged to keep me informed of any acute changes, lack of improvement, or any new concerning symptoms.  Patient voiced understanding of all instructions and denied further questions.     Will follow up and continue to monitor.     [x]  Discussed Patient in detail with nursing/staff, addressed all needs today.     [x]  Plan of Care Reviewed   [x]  PT/OT Reviewed   [x]  Order Changes  [x]  Discharge Plans Reviewed  [x]  Advance Directive on file with Nursing Home.   [x]  POA on file with Nursing Home.   [x]  Code Status listed: [x]  Full Code   []  DNR     “I confirm accuracy of unchanged data/findings which have been carried forward from previous visit, as well as I have updated appropriately those that have changed.”     Marlene Garcia, LATONIA.

## 2024-07-12 NOTE — PROGRESS NOTES
Nursing Home Follow Up Note      Luigi Urena DO []   LATONIA Tony [x]  [address] [place] [address]  Phone: [phone]  Fax: [phone] Marcell Christina MD []    Rm Tran DO []   [address] [place] [address]  Phone: [phone]  Fax: [phone]     PATIENT NAME: Viviane Peñaloza                                                                          YOB: 1955           DATE OF SERVICE: 7/12/2024  FACILITY:  [][facility]   [] [facility]   [x] [facility]   [] [facility]   [] Other ______________________________________________________________________      CHIEF COMPLAINT:    Complaints of right breast pain for the past couple days.       HISTORY OF PRESENT ILLNESS:     Patient with complaints of right breast pain, about 5 o'clock, for the past couple days. There is no mass or lesions, no redness or warmth. Reports that the breast is just sore to touch. Does not know of any injuries, but not sure if she bumped something or slept wrong. Had normal Mammogram in September 2023. Has not had this before. No other complaints, no signs and symptoms of any distress.     PAST MEDICAL & SURGICAL HISTORY:   Past Medical History:   Diagnosis Date    Anemia     Anxiety     Ascorbic acid deficiency     Depression     Epilepsy     Fracture of unspecified phalanx of left index finger, initial encounter for closed fracture 01/16/2019    Fracture, clavicle     left     Hyperlipidemia     Hypertension     Need for assistance with personal care     Obesity     Presence of artificial knee joint, left     Presence of right artificial knee joint     Repeated falls     Shoulder fracture, left     Vitamin D deficiency       Past Surgical History:   Procedure Laterality Date    OOPHORECTOMY           MEDICATIONS:  I have reviewed and reconciled the patients medication list in the patients chart at the skilled nursing facility today.      ALLERGIES:    Allergies   Allergen Reactions     Tramadol Other (See Comments)         SOCIAL HISTORY:    Social History     Socioeconomic History    Marital status: Single   Tobacco Use    Smoking status: Never    Smokeless tobacco: Never   Substance and Sexual Activity    Alcohol use: No    Drug use: No    Sexual activity: Defer       FAMILY HISTORY:    Family History   Problem Relation Age of Onset    Diabetes Mother     Heart disease Mother     Hypertension Mother     Hypertension Father     Diabetes Sister     Hypertension Sister        REVIEW OF SYSTEMS:    Review of Systems   Constitutional:  Negative for activity change, appetite change, chills, diaphoresis, fatigue, fever, unexpected weight gain and unexpected weight loss.   Eyes:  Negative for visual disturbance.   Respiratory:  Negative for cough, chest tightness, shortness of breath and wheezing.    Cardiovascular:  Negative for chest pain, palpitations and leg swelling.   Gastrointestinal:  Negative for abdominal pain, constipation, diarrhea, nausea, vomiting and indigestion.   Genitourinary:  Positive for breast pain (right). Negative for decreased urine volume, difficulty urinating, dysuria, frequency, hematuria, urgency and urinary incontinence.   Musculoskeletal:  Positive for arthralgias (chronic), back pain (chronic) and gait problem.   Skin:  Negative for pallor and rash.   Neurological:  Positive for weakness and memory problem. Negative for dizziness and confusion.   Psychiatric/Behavioral:  Negative for behavioral problems, dysphoric mood, sleep disturbance and depressed mood. The patient is not nervous/anxious.          PHYSICAL EXAMINATION:   VITAL SIGNS:   Vitals:    07/12/24 1020   BP: 116/60   Pulse: 58   Resp: 18   Temp: 97.8 °F (36.6 °C)   SpO2: 94%   Weight: 86.4 kg (190 lb 6.4 oz)        Physical Exam  Vitals and nursing note reviewed.   Constitutional:       Appearance: She is well-developed.   Cardiovascular:      Rate and Rhythm: Normal rate and regular rhythm.   Pulmonary:       Effort: Pulmonary effort is normal. No respiratory distress.   Chest:       Musculoskeletal:      Comments: LE weakness   Skin:     Findings: No rash.      Comments: Bruise to right toes, arm and leg will obtain CBC and CMP.   Neurological:      General: No focal deficit present.      Mental Status: She is alert and oriented to person, place, and time.   Psychiatric:         Mood and Affect: Mood and affect normal.         Behavior: Behavior normal.         Cognition and Memory: Cognition and memory normal.         RECORDS REVIEW:   I have reviewed records in [facility]    ASSESSMENT     Diagnoses and all orders for this visit:    1. Breast pain, right    2. Impaired mobility and ADLs            PLAN    Right breast pain  -US of right breast ordered to be performed here in facility but they do not perform here in facility unless mass noted. If pain continues, will schedule for outpatient US and do annual Mammogram if needed. Will follow up and continue to monitor.     Nursing to continue supportive care for all ADLs.    Patient was encouraged to keep me informed of any acute changes, lack of improvement, or any new concerning symptoms.  Patient voiced understanding of all instructions and denied further questions.     Will follow up and continue to monitor.     [x]  Discussed Patient in detail with nursing/staff, addressed all needs today.     [x]  Plan of Care Reviewed   [x]  PT/OT Reviewed   [x]  Order Changes  [x]  Discharge Plans Reviewed  [x]  Advance Directive on file with Nursing Home.   [x]  POA on file with Nursing Home.   [x]  Code Status listed: [x]  Full Code   []  DNR     “I confirm accuracy of unchanged data/findings which have been carried forward from previous visit, as well as I have updated appropriately those that have changed.”     Marlene Garcia, LATONIA.

## 2024-07-17 ENCOUNTER — NURSING HOME (OUTPATIENT)
Dept: FAMILY MEDICINE CLINIC | Facility: CLINIC | Age: 69
End: 2024-07-17
Payer: MEDICARE

## 2024-07-17 VITALS
RESPIRATION RATE: 18 BRPM | OXYGEN SATURATION: 94 % | TEMPERATURE: 97.8 F | BODY MASS INDEX: 34.82 KG/M2 | WEIGHT: 190.4 LBS | DIASTOLIC BLOOD PRESSURE: 60 MMHG | SYSTOLIC BLOOD PRESSURE: 116 MMHG | HEART RATE: 68 BPM

## 2024-07-17 DIAGNOSIS — Z78.9 IMPAIRED MOBILITY AND ADLS: Primary | Chronic | ICD-10-CM

## 2024-07-17 DIAGNOSIS — K59.04 CHRONIC IDIOPATHIC CONSTIPATION: ICD-10-CM

## 2024-07-17 DIAGNOSIS — Z74.09 IMPAIRED MOBILITY AND ADLS: Primary | Chronic | ICD-10-CM

## 2024-07-17 DIAGNOSIS — G40.309 NONINTRACTABLE GENERALIZED IDIOPATHIC EPILEPSY WITHOUT STATUS EPILEPTICUS: Chronic | ICD-10-CM

## 2024-07-17 DIAGNOSIS — E78.2 MIXED HYPERLIPIDEMIA: ICD-10-CM

## 2024-07-17 DIAGNOSIS — M25.561 ARTHRALGIA OF BOTH KNEES: ICD-10-CM

## 2024-07-17 DIAGNOSIS — M17.0 PRIMARY OSTEOARTHRITIS OF BOTH KNEES: ICD-10-CM

## 2024-07-17 DIAGNOSIS — M25.562 ARTHRALGIA OF BOTH KNEES: ICD-10-CM

## 2024-07-17 DIAGNOSIS — R29.6 REPEATED FALLS: ICD-10-CM

## 2024-07-17 PROBLEM — S92.352A DISPLACED FRACTURE OF FIFTH METATARSAL BONE, LEFT FOOT, INITIAL ENCOUNTER FOR CLOSED FRACTURE: Status: RESOLVED | Noted: 2023-12-20 | Resolved: 2024-07-17

## 2024-07-17 PROCEDURE — 99309 SBSQ NF CARE MODERATE MDM 30: CPT | Performed by: FAMILY MEDICINE

## 2024-07-17 NOTE — PROGRESS NOTES
Nursing Home Progress Note        Luigi Urena DO [x]  LATONIA Tony []  [address] [place] [address]  Phone: [phone]  Fax: [phone] Marcell Christina MD []  Rm Tran DO []  [address] [place] [address]  Phone: [phone]  Fax: [phone]     PATIENT NAME: Viviane Peñaloza                                                                          YOB: 1955           DATE OF SERVICE: 7/17/2024  FACILITY: []  [facility]  [] [facility]  [x]  [facility]  [] [facility]  []  Other ______________________________________________________________________     CHIEF COMPLAINT:   Chronic medical management and long-term care/seizure activity      HISTORY OF PRESENT ILLNESS:   [x]  Follow Up visit for coordination of long term care issues and chronic medical management of Diagnoses and all orders for this visit:    1. Impaired mobility and ADLs (Primary)    2. Primary osteoarthritis of both knees    3. Nonintractable generalized idiopathic epilepsy without status epilepticus    4. Mixed hyperlipidemia    5. Repeated falls    6. Chronic idiopathic constipation    7. Arthralgia of both knees      History of Present Illness  The patient remains interactive during questioning and examination. Nursing and staff reports that she has been receptive to supportive care. No new falls or injuries reported. She has been sleeping well.     Vital signs have been stable, no complaints of chest pain.     Pain has been relatively well controlled as it relates to her advanced osteoarthritic changes of her knees.     No reports of seizure-like activity since last visit.     Bowel function remains regular.       PAST MEDICAL & SURGICAL HISTORY:   Past Medical History:   Diagnosis Date    Anemia     Anxiety     Ascorbic acid deficiency     Depression     Epilepsy     Fracture of unspecified phalanx of left index finger, initial encounter for closed fracture 01/16/2019    Fracture, clavicle      left     Hyperlipidemia     Hypertension     Need for assistance with personal care     Obesity     Presence of artificial knee joint, left     Presence of right artificial knee joint     Repeated falls     Shoulder fracture, left     Vitamin D deficiency       Past Surgical History:   Procedure Laterality Date    OOPHORECTOMY           MEDICATIONS:  I have reviewed and reconciled the patients medication list in the patients chart at the skilled nursing facility today.      ALLERGIES:    Allergies   Allergen Reactions    Tramadol Other (See Comments)         SOCIAL HISTORY:    Social History     Socioeconomic History    Marital status: Single   Tobacco Use    Smoking status: Never    Smokeless tobacco: Never   Substance and Sexual Activity    Alcohol use: No    Drug use: No    Sexual activity: Defer       FAMILY HISTORY:    Family History   Problem Relation Age of Onset    Diabetes Mother     Heart disease Mother     Hypertension Mother     Hypertension Father     Diabetes Sister     Hypertension Sister        REVIEW OF SYSTEMS:    Review of Systems  Appetite: Fair []   Good [x]   Poor []   Weight Loss []  [x]  Weight Stable   Unavoidable Weight Loss []  Tolerating Tube Feeding []    Supplements Provided []   Constitutional: Negative for fever, chills, diaphoresis or fatigue and weight change.   HENT: No dysphagia; no changes to vision/hearing/smell/taste; no epistaxis  Eyes: Negative for redness and visual disturbance.   Respiratory: Negative for shortness of breath, chest pain, cough or chest tightness.   Cardiovascular: Negative for chest pain and palpitations.   Gastrointestinal: Negative for abdominal distention, abdominal pain and blood in stool.   Endocrine: Negative for cold intolerance and heat intolerance.   Genitourinary: Negative for difficulty urinating, dysuria and frequency.Negative for hematuria   Musculoskeletal: Chronic myalgias and arthralgias, worse to the left knee but improving.  Left foot as  per above.  Integumentary: Postsurgical scarring noted to the left knee.  No drainage or streaking erythema.  Neurological: Negative for syncope, weakness and headaches.   Hematological: Negative for adenopathy. Does not bruise/bleed easily.   Immunological: Negative for reported allergies or immunological disorders  Psychological: No acute behavioral changes    PHYSICAL EXAMINATION:   VITAL SIGNS:   Vitals:    07/17/24 0833   BP: 116/60   Pulse: 68   Resp: 18   Temp: 97.8 °F (36.6 °C)   SpO2: 94%       Physical Exam      General Appearance:  [x]  Alert   [x]  Oriented x person  [x]  No acute distress     []  Confused  []  Disoriented   []  Comatose   Head:  Atraumatic and normocephalic, without obvious abnormality.   Eyes:         PERRLA, conjunctivae and sclerae normal, no Icterus. No pallor. Extra-occular movements are within normal limits.   Ears:  Ears appear intact with no abnormalities noted.   Throat: No oral lesions, no thrush, oral mucosa moist.   Neck: Supple, trachea midline, no thyromegaly, no carotid bruit.   Back:   No kyphoscoliosis. No tenderness to palpation.   Lungs:   Chest shape is normal. Breath sounds heard bilaterally equally.  No wheezing.    Audible air exchange noted all lung fields.   Heart:  Normal S1 and S2, no murmur, no gallop, no rub. No JVD.   Abdomen:   Normal bowel sounds, no masses, no organomegaly. Soft, non-tender, non-distended, no guarding.  Mild truncal obesity.   Extremities: Moves all extremities; mild gravity dependent edema of bilateral lower extremities, without cyanosis or clubbing.    Poor core strength and stability.   Requires assistance with transfers at times.  Utilizes walker for assistance with ambulation.  Quadriceps mechanism intact.  Johnathan/Nguyen test negative.  Symmetric and appropriate dorsiflexion and plantar flexion of bilateral feet.  Pain on palpation of the lateral dorsal foot.   Pulses: Pulses palpable and equal bilaterally.   Skin: No bleeding or  rash.  Generalized dry skin noted.  Age-related atrophy of skin.  No lesions or rashes.   Neurologic: [x] Normal speech []  Normal mental status    [x] Cranial nerves II through XII intact   [x]  No anosmia [x]  DTR 2+ [x]  Proprioception intact  [x]  No focal motor/sensory deficits      Psych/Mood:                    [x]  No acute changes []  Depressed  Urinary:                            [x]  Continent  []  Incontinent []  Retention  []  F/C      []  UTI w/treatment in progress           ASSESSMENT     Diagnoses and all orders for this visit:    1. Impaired mobility and ADLs (Primary)    2. Primary osteoarthritis of both knees    3. Nonintractable generalized idiopathic epilepsy without status epilepticus    4. Mixed hyperlipidemia    5. Repeated falls    6. Chronic idiopathic constipation    7. Arthralgia of both knees          PLAN  Assessment & Plan    Supportive care will be continued for mobilization/transfer assistance. Fall precautions are in place due to her advanced degenerative osteoarthritic changes of the knees, and she is advised to maintain as much activity as possible.     Her vital signs demonstrate hemodynamic stability, and her blood pressure is at goal. No findings of unstable angina are observed.     The current antiepileptic treatment regimen will be maintained, with no seizure-like activity since the last visit.     Long-term medication surveillance labs will be conducted as needed.         [x]  Discussed Patient in detail with nursing/staff, addressed all needs today.     [x]  Plan of Care Reviewed   []  PT/OT Reviewed   []  Order Changes  []  Discharge Plans Reviewed   []  Code Status Changes     Luigi Urena         7/17/2024     I spent 35 minutes caring for Viviane on this date of service. This time includes time spent by me in the following activities:preparing for the visit, performing a medically appropriate examination and/or evaluation , counseling and educating the  patient/family/caregiver, ordering medications, tests, or procedures, documenting information in the medical record, and care coordination    I confirm accuracy of unchanged data/findings which have been carried forward from previous visit, as well as I have updated appropriately those that have changed.    Patient or patient representative verbalized consent for the use of Ambient Listening during the visit with  Luigi Urena DO for chart documentation. 7/17/2024  11:17 EDT

## 2024-07-31 ENCOUNTER — NURSING HOME (OUTPATIENT)
Dept: FAMILY MEDICINE CLINIC | Facility: CLINIC | Age: 69
End: 2024-07-31
Payer: MEDICARE

## 2024-07-31 VITALS
RESPIRATION RATE: 18 BRPM | WEIGHT: 190.4 LBS | TEMPERATURE: 98.2 F | OXYGEN SATURATION: 92 % | DIASTOLIC BLOOD PRESSURE: 78 MMHG | SYSTOLIC BLOOD PRESSURE: 130 MMHG | HEART RATE: 78 BPM | BODY MASS INDEX: 34.82 KG/M2

## 2024-07-31 DIAGNOSIS — M25.562 BILATERAL ANTERIOR KNEE PAIN: ICD-10-CM

## 2024-07-31 DIAGNOSIS — Z74.09 IMPAIRED MOBILITY AND ADLS: Chronic | ICD-10-CM

## 2024-07-31 DIAGNOSIS — R26.89 LOSS OF BALANCE: ICD-10-CM

## 2024-07-31 DIAGNOSIS — Z78.9 IMPAIRED MOBILITY AND ADLS: Chronic | ICD-10-CM
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
## 2024-07-31 DIAGNOSIS — M25.561 BILATERAL ANTERIOR KNEE PAIN: ICD-10-CM

## 2024-07-31 PROCEDURE — 99308 SBSQ NF CARE LOW MDM 20: CPT | Performed by: NURSE PRACTITIONER

## 2024-07-31 NOTE — PROGRESS NOTES
"Nursing Home Follow Up Note      Luigi Urena DO []   LATONIA Tony [x]  [address] [place] [address]  Phone: [phone]  Fax: [phone] Marcell Christina MD []    Rm Tran DO []   [address] [place] [address]  Phone: [phone]  Fax: [phone]     PATIENT NAME: Viviane Peñaloza                                                                          YOB: 1955           DATE OF SERVICE: 7/31/2024  FACILITY:  [][place]   [] [place]   [x] [facility]   [] [facility]   [] Other ______________________________________________________________________      CHIEF COMPLAINT:    Follow fall and xray results.       HISTORY OF PRESENT ILLNESS:     Patient was ambulating yesterday with walker, and reports she \"felt like her right knee just gave out.\" She fell down on to her knees. She was able to get herself back up. A staff member did witness this to be true. She did not hit anything else. She then had a little pain in both knees,  right greater than left. Reports pain is better today but does still have some right knee pain. Has history of bilateral knee replacements. Xrays performed on bilateral knees and results today report no fractures or other abnormal findings. Hardware in place.       PAST MEDICAL & SURGICAL HISTORY:   Past Medical History:   Diagnosis Date    Anemia     Anxiety     Ascorbic acid deficiency     Depression     Epilepsy     Fracture of unspecified phalanx of left index finger, initial encounter for closed fracture 01/16/2019    Fracture, clavicle     left     Hyperlipidemia     Hypertension     Need for assistance with personal care     Obesity     Presence of artificial knee joint, left     Presence of right artificial knee joint     Repeated falls     Shoulder fracture, left     Vitamin D deficiency       Past Surgical History:   Procedure Laterality Date    OOPHORECTOMY           MEDICATIONS:  I have reviewed and reconciled the patients " medication list in the patients chart at the skilled nursing facility today.      ALLERGIES:    Allergies   Allergen Reactions    Tramadol Other (See Comments)         SOCIAL HISTORY:    Social History     Socioeconomic History    Marital status: Single   Tobacco Use    Smoking status: Never    Smokeless tobacco: Never   Substance and Sexual Activity    Alcohol use: No    Drug use: No    Sexual activity: Defer       FAMILY HISTORY:    Family History   Problem Relation Age of Onset    Diabetes Mother     Heart disease Mother     Hypertension Mother     Hypertension Father     Diabetes Sister     Hypertension Sister        REVIEW OF SYSTEMS:    Review of Systems   Constitutional:  Negative for activity change, appetite change, chills, diaphoresis, fatigue, fever, unexpected weight gain and unexpected weight loss.   Eyes:  Negative for visual disturbance.   Respiratory:  Negative for cough, chest tightness and shortness of breath.    Cardiovascular:  Negative for chest pain, palpitations and leg swelling.   Gastrointestinal:  Negative for abdominal pain, constipation, diarrhea, nausea, vomiting and indigestion.   Genitourinary:  Negative for decreased urine volume, difficulty urinating, dysuria, frequency, hematuria, urgency and urinary incontinence.   Musculoskeletal:  Positive for arthralgias (right knee pain), back pain (chronic) and gait problem.   Skin:  Negative for pallor and rash.   Neurological:  Positive for weakness and memory problem. Negative for dizziness and confusion.   Psychiatric/Behavioral:  Negative for behavioral problems, dysphoric mood, sleep disturbance and depressed mood. The patient is not nervous/anxious.          PHYSICAL EXAMINATION:   VITAL SIGNS:   There were no vitals filed for this visit.       Physical Exam  Vitals and nursing note reviewed.   Constitutional:       Appearance: She is well-developed.   Cardiovascular:      Rate and Rhythm: Normal rate and regular rhythm.   Pulmonary:       Effort: Pulmonary effort is normal. No respiratory distress.      Breath sounds: Normal breath sounds.   Abdominal:      General: Bowel sounds are normal. There is no distension.      Tenderness: There is no abdominal tenderness.   Musculoskeletal:      Right knee: Tenderness present.      Comments: Painful ROM right knee    LE weakness   Skin:     Findings: No rash.      Comments: Bruise to right toes, arm and leg will obtain CBC and CMP.   Neurological:      General: No focal deficit present.      Mental Status: She is alert and oriented to person, place, and time.   Psychiatric:         Mood and Affect: Mood and affect normal.         Behavior: Behavior normal.         Cognition and Memory: Cognition and memory normal.         RECORDS REVIEW:   I have reviewed records in [facility]    ASSESSMENT     Diagnoses and all orders for this visit:    1. Fall, initial encounter (Primary)    2. Bilateral anterior knee pain    3. Loss of balance    4. Impaired mobility and ADLs        PLAN    Fall/bilateral knee pain/loss of balance  -Xrays of bilateral knees negative for any acute findings. Advised patient will continue to follow up with her and if pain continues, or feeling of knee giving out, will have her follow up with Ortho.     Nursing to continue supportive care for all ADLs.    Patient was encouraged to keep me informed of any acute changes, lack of improvement, or any new concerning symptoms.  Patient voiced understanding of all instructions and denied further questions.     Will follow up and continue to monitor.     [x]  Discussed Patient in detail with nursing/staff, addressed all needs today.     [x]  Plan of Care Reviewed   [x]  PT/OT Reviewed   [x]  Order Changes  [x]  Discharge Plans Reviewed  [x]  Advance Directive on file with Nursing Home.   [x]  POA on file with Nursing Home.   [x]  Code Status listed: [x]  Full Code   []  DNR     “I confirm accuracy of unchanged data/findings which have been carried forward  from previous visit, as well as I have updated appropriately those that have changed.”         Marlene Garcia, APRN.

## 2024-07-31 NOTE — LETTER
"Nursing Home Follow Up Note      Luigi Urena DO []   LATONIA Tony [x]  [address] [place] [address]  Phone: [phone]  Fax: [phone] Marcell Christina MD []    Rm Tran DO []   [address] [place] [address]  Phone: [phone]  Fax: [phone]     PATIENT NAME: Viviane Peñaloza                                                                          YOB: 1955           DATE OF SERVICE: 7/31/2024  FACILITY:  [][place]   [] [place]   [x] [facility]   [] [facility]   [] Other ______________________________________________________________________      CHIEF COMPLAINT:    Follow fall and xray results.       HISTORY OF PRESENT ILLNESS:     Patient was ambulating yesterday with walker, and reports she \"felt like her right knee just gave out.\" She fell down on to her knees. She was able to get herself back up. A staff member did witness this to be true. She did not hit anything else. She then had a little pain in both knees,  right greater than left. Reports pain is better today but does still have some right knee pain. Has history of bilateral knee replacements. Xrays performed on bilateral knees and results today report no fractures or other abnormal findings. Hardware in place.       PAST MEDICAL & SURGICAL HISTORY:   Past Medical History:   Diagnosis Date    Anemia     Anxiety     Ascorbic acid deficiency     Depression     Epilepsy     Fracture of unspecified phalanx of left index finger, initial encounter for closed fracture 01/16/2019    Fracture, clavicle     left     Hyperlipidemia     Hypertension     Need for assistance with personal care     Obesity     Presence of artificial knee joint, left     Presence of right artificial knee joint     Repeated falls     Shoulder fracture, left     Vitamin D deficiency       Past Surgical History:   Procedure Laterality Date    OOPHORECTOMY           MEDICATIONS:  I have reviewed and reconciled the patients " medication list in the patients chart at the skilled nursing facility today.      ALLERGIES:    Allergies   Allergen Reactions    Tramadol Other (See Comments)         SOCIAL HISTORY:    Social History     Socioeconomic History    Marital status: Single   Tobacco Use    Smoking status: Never    Smokeless tobacco: Never   Substance and Sexual Activity    Alcohol use: No    Drug use: No    Sexual activity: Defer       FAMILY HISTORY:    Family History   Problem Relation Age of Onset    Diabetes Mother     Heart disease Mother     Hypertension Mother     Hypertension Father     Diabetes Sister     Hypertension Sister        REVIEW OF SYSTEMS:    Review of Systems   Constitutional:  Negative for activity change, appetite change, chills, diaphoresis, fatigue, fever, unexpected weight gain and unexpected weight loss.   Eyes:  Negative for visual disturbance.   Respiratory:  Negative for cough, chest tightness and shortness of breath.    Cardiovascular:  Negative for chest pain, palpitations and leg swelling.   Gastrointestinal:  Negative for abdominal pain, constipation, diarrhea, nausea, vomiting and indigestion.   Genitourinary:  Negative for decreased urine volume, difficulty urinating, dysuria, frequency, hematuria, urgency and urinary incontinence.   Musculoskeletal:  Positive for arthralgias (right knee pain), back pain (chronic) and gait problem.   Skin:  Negative for pallor and rash.   Neurological:  Positive for weakness and memory problem. Negative for dizziness and confusion.   Psychiatric/Behavioral:  Negative for behavioral problems, dysphoric mood, sleep disturbance and depressed mood. The patient is not nervous/anxious.          PHYSICAL EXAMINATION:   VITAL SIGNS:   There were no vitals filed for this visit.       Physical Exam  Vitals and nursing note reviewed.   Constitutional:       Appearance: She is well-developed.   Cardiovascular:      Rate and Rhythm: Normal rate and regular rhythm.   Pulmonary:       Effort: Pulmonary effort is normal. No respiratory distress.      Breath sounds: Normal breath sounds.   Abdominal:      General: Bowel sounds are normal. There is no distension.      Tenderness: There is no abdominal tenderness.   Musculoskeletal:      Right knee: Tenderness present.      Comments: Painful ROM right knee    LE weakness   Skin:     Findings: No rash.      Comments: Bruise to right toes, arm and leg will obtain CBC and CMP.   Neurological:      General: No focal deficit present.      Mental Status: She is alert and oriented to person, place, and time.   Psychiatric:         Mood and Affect: Mood and affect normal.         Behavior: Behavior normal.         Cognition and Memory: Cognition and memory normal.         RECORDS REVIEW:   I have reviewed records in [facility]    ASSESSMENT     Diagnoses and all orders for this visit:    1. Fall, initial encounter (Primary)    2. Bilateral anterior knee pain    3. Loss of balance    4. Impaired mobility and ADLs        PLAN    Fall/bilateral knee pain/loss of balance  -Xrays of bilateral knees negative for any acute findings. Advised patient will continue to follow up with her and if pain continues, or feeling of knee giving out, will have her follow up with Ortho.     Nursing to continue supportive care for all ADLs.    Patient was encouraged to keep me informed of any acute changes, lack of improvement, or any new concerning symptoms.  Patient voiced understanding of all instructions and denied further questions.     Will follow up and continue to monitor.     [x]  Discussed Patient in detail with nursing/staff, addressed all needs today.     [x]  Plan of Care Reviewed   [x]  PT/OT Reviewed   [x]  Order Changes  [x]  Discharge Plans Reviewed  [x]  Advance Directive on file with Nursing Home.   [x]  POA on file with Nursing Home.   [x]  Code Status listed: [x]  Full Code   []  DNR     “I confirm accuracy of unchanged data/findings which have been carried forward  from previous visit, as well as I have updated appropriately those that have changed.”         Marlene Garcia, APRN.

## 2024-09-06 NOTE — TELEPHONE ENCOUNTER
[facility] REQUESTING MED REFILL FOR CLONAZEPAM 0.5 MG.    DIRECTIONS: CLONAZEPAM 0.5 MG 1 TAP PO BID.

## 2024-09-09 RX ORDER — CLONAZEPAM 0.5 MG/1
0.5 TABLET ORAL 2 TIMES DAILY
Qty: 60 TABLET | Refills: 5 | Status: SHIPPED | OUTPATIENT
Start: 2024-09-09

## 2024-09-18 ENCOUNTER — NURSING HOME (OUTPATIENT)
Dept: FAMILY MEDICINE CLINIC | Facility: CLINIC | Age: 69
End: 2024-09-18
Payer: MEDICARE

## 2024-09-18 VITALS
RESPIRATION RATE: 18 BRPM | SYSTOLIC BLOOD PRESSURE: 130 MMHG | WEIGHT: 194.4 LBS | DIASTOLIC BLOOD PRESSURE: 78 MMHG | OXYGEN SATURATION: 92 % | TEMPERATURE: 98.2 F | BODY MASS INDEX: 35.56 KG/M2 | HEART RATE: 78 BPM

## 2024-09-18 DIAGNOSIS — M81.0 AGE-RELATED OSTEOPOROSIS WITHOUT CURRENT PATHOLOGICAL FRACTURE: ICD-10-CM

## 2024-09-18 DIAGNOSIS — G40.309 NONINTRACTABLE GENERALIZED IDIOPATHIC EPILEPSY WITHOUT STATUS EPILEPTICUS: Chronic | ICD-10-CM

## 2024-09-18 DIAGNOSIS — Z78.9 IMPAIRED MOBILITY AND ADLS: Primary | Chronic | ICD-10-CM

## 2024-09-18 DIAGNOSIS — E78.5 DYSLIPIDEMIA: Chronic | ICD-10-CM

## 2024-09-18 DIAGNOSIS — Z74.09 IMPAIRED MOBILITY AND ADLS: Primary | Chronic | ICD-10-CM

## 2024-09-18 DIAGNOSIS — M25.561 ARTHRALGIA OF BOTH KNEES: ICD-10-CM

## 2024-09-18 DIAGNOSIS — M25.562 ARTHRALGIA OF BOTH KNEES: ICD-10-CM

## 2024-09-18 DIAGNOSIS — M17.0 PRIMARY OSTEOARTHRITIS OF BOTH KNEES: ICD-10-CM

## 2024-09-18 DIAGNOSIS — E55.9 VITAMIN D DEFICIENCY: ICD-10-CM

## 2024-09-18 PROCEDURE — 99309 SBSQ NF CARE MODERATE MDM 30: CPT | Performed by: FAMILY MEDICINE

## 2024-10-24 RX ORDER — PHENOBARBITAL 64.8 MG/1
64.8 TABLET ORAL 2 TIMES DAILY
Qty: 60 TABLET | Refills: 5 | Status: SHIPPED | OUTPATIENT
Start: 2024-10-24

## 2024-11-20 ENCOUNTER — NURSING HOME (OUTPATIENT)
Dept: FAMILY MEDICINE CLINIC | Facility: CLINIC | Age: 69
End: 2024-11-20
Payer: MEDICARE

## 2024-11-20 VITALS
RESPIRATION RATE: 18 BRPM | SYSTOLIC BLOOD PRESSURE: 132 MMHG | HEIGHT: 62 IN | OXYGEN SATURATION: 96 % | TEMPERATURE: 97.9 F | WEIGHT: 194.6 LBS | HEART RATE: 72 BPM | BODY MASS INDEX: 35.81 KG/M2 | DIASTOLIC BLOOD PRESSURE: 74 MMHG

## 2024-11-20 DIAGNOSIS — M25.562 ARTHRALGIA OF BOTH KNEES: ICD-10-CM

## 2024-11-20 DIAGNOSIS — Z78.9 IMPAIRED MOBILITY AND ADLS: Primary | Chronic | ICD-10-CM

## 2024-11-20 DIAGNOSIS — E78.5 DYSLIPIDEMIA: Chronic | ICD-10-CM

## 2024-11-20 DIAGNOSIS — M25.561 ARTHRALGIA OF BOTH KNEES: ICD-10-CM

## 2024-11-20 DIAGNOSIS — G40.309 NONINTRACTABLE GENERALIZED IDIOPATHIC EPILEPSY WITHOUT STATUS EPILEPTICUS: Chronic | ICD-10-CM

## 2024-11-20 DIAGNOSIS — M17.0 PRIMARY OSTEOARTHRITIS OF BOTH KNEES: ICD-10-CM

## 2024-11-20 DIAGNOSIS — Z74.09 IMPAIRED MOBILITY AND ADLS: Primary | Chronic | ICD-10-CM

## 2024-11-21 NOTE — PROGRESS NOTES
Nursing Home Progress Note        Luigi Urena DO [x]  LATONIA Tony []  [address] [place] [address]  Phone: [phone]  Fax: [phone] Marcell Christina MD []  Rm Tran DO []  [address] [place] [address]  Phone: [phone]  Fax: [phone]     PATIENT NAME: Viviane Peñaloza                                                                          YOB: 1955           DATE OF SERVICE: 11/20/2024  FACILITY: []  [place]  [] [place]  [x]  [facility]  [] [facility]  []  Other ______________________________________________________________________     CHIEF COMPLAINT:   Chronic medical management and long-term care/seizure activity      HISTORY OF PRESENT ILLNESS:   [x]  Follow Up visit for coordination of long term care issues and chronic medical management of Diagnoses and all orders for this visit:    1. Impaired mobility and ADLs (Primary)    2. Nonintractable generalized idiopathic epilepsy without status epilepticus    3. Primary osteoarthritis of both knees    4. Arthralgia of both knees    5. Dyslipidemia    Nursing/staff reports the patient has been receptive to supportive care, she remains interactive during questioning/examination.  Pleasant overall.  No reports of any nutritional/hydration deficits.    No seizure-like activity reported since last visit.    Pain has appeared clinically well-controlled.    Vital signs have been stable.       PAST MEDICAL & SURGICAL HISTORY:   Past Medical History:   Diagnosis Date    Anemia     Anxiety     Ascorbic acid deficiency     Depression     Epilepsy     Fracture of unspecified phalanx of left index finger, initial encounter for closed fracture 01/16/2019    Fracture, clavicle     left     Hyperlipidemia     Hypertension     Need for assistance with personal care     Obesity     Presence of artificial knee joint, left     Presence of right artificial knee joint     Repeated falls     Shoulder fracture, left      Vitamin D deficiency       Past Surgical History:   Procedure Laterality Date    OOPHORECTOMY           MEDICATIONS:  I have reviewed and reconciled the patients medication list in the patients chart at the skilled nursing facility today.      ALLERGIES:    Allergies   Allergen Reactions    Tramadol Other (See Comments)         SOCIAL HISTORY:    Social History     Socioeconomic History    Marital status: Single   Tobacco Use    Smoking status: Never    Smokeless tobacco: Never   Substance and Sexual Activity    Alcohol use: No    Drug use: No    Sexual activity: Defer       FAMILY HISTORY:    Family History   Problem Relation Age of Onset    Diabetes Mother     Heart disease Mother     Hypertension Mother     Hypertension Father     Diabetes Sister     Hypertension Sister        REVIEW OF SYSTEMS:    Review of Systems  Appetite: Fair []   Good [x]   Poor []   Weight Loss []  [x]  Weight Stable   Unavoidable Weight Loss []  Tolerating Tube Feeding []    Supplements Provided []   Constitutional: Negative for fever, chills, diaphoresis or fatigue and weight change.   HENT: No dysphagia; no changes to vision/hearing/smell/taste; no epistaxis  Eyes: Negative for redness and visual disturbance.   Respiratory: Negative for shortness of breath, chest pain, cough or chest tightness.   Cardiovascular: Negative for chest pain and palpitations.   Gastrointestinal: Negative for abdominal distention, abdominal pain and blood in stool.   Endocrine: Negative for cold intolerance and heat intolerance.   Genitourinary: Negative for difficulty urinating, dysuria and frequency.Negative for hematuria   Musculoskeletal: Chronic myalgias and arthralgias, worse to the left knee but improving.  Left foot as per above.  Integumentary: Postsurgical scarring noted to the left knee.  No drainage or streaking erythema.  Neurological: Negative for syncope, weakness and headaches.   Hematological: Negative for adenopathy. Does not bruise/bleed  easily.   Immunological: Negative for reported allergies or immunological disorders  Psychological: No acute behavioral changes    PHYSICAL EXAMINATION:   VITAL SIGNS:   Vitals:    11/20/24 0902   BP: 132/74   Pulse: 72   Resp: 18   Temp: 97.9 °F (36.6 °C)   SpO2: 96%       Physical Exam      General Appearance:  [x]  Alert   [x]  Oriented x person  [x]  No acute distress     []  Confused  []  Disoriented   []  Comatose   Head:  Atraumatic and normocephalic, without obvious abnormality.   Eyes:         PERRLA, conjunctivae and sclerae normal, no Icterus. No pallor. Extra-occular movements are within normal limits.   Ears:  Ears appear intact with no abnormalities noted.   Throat: No oral lesions, no thrush, oral mucosa moist.   Neck: Supple, trachea midline, no thyromegaly, no carotid bruit.   Back:   No kyphoscoliosis. No tenderness to palpation.   Lungs:   Chest shape is normal. Breath sounds heard bilaterally equally.  No wheezing.    Audible air exchange noted all lung fields.   Heart:  Normal S1 and S2, no murmur, no gallop, no rub. No JVD.   Abdomen:   Normal bowel sounds, no masses, no organomegaly. Soft, non-tender, non-distended, no guarding.  Mild truncal obesity.   Extremities: Moves all extremities; mild gravity dependent edema of bilateral lower extremities, without cyanosis or clubbing.    Poor core strength and stability.   Requires assistance with transfers at times.  Utilizes walker for assistance with ambulation.  Quadriceps mechanism intact.  Johnathan/Nguyen test negative.  Symmetric and appropriate dorsiflexion and plantar flexion of bilateral feet.  Pain on palpation of the lateral dorsal foot.   Pulses: Pulses palpable and equal bilaterally.   Skin: No bleeding or rash.  Generalized dry skin noted.  Age-related atrophy of skin.  No lesions or rashes.   Neurologic: [x] Normal speech []  Normal mental status    [x] Cranial nerves II through XII intact   [x]  No anosmia [x]  DTR 2+ [x]   Proprioception intact  [x]  No focal motor/sensory deficits      Psych/Mood:                    [x]  No acute changes []  Depressed  Urinary:                            [x]  Continent  []  Incontinent []  Retention  []  F/C      []  UTI w/treatment in progress           ASSESSMENT     Diagnoses and all orders for this visit:    1. Impaired mobility and ADLs (Primary)    2. Nonintractable generalized idiopathic epilepsy without status epilepticus    3. Primary osteoarthritis of both knees    4. Arthralgia of both knees    5. Dyslipidemia          PLAN  Supportive care to be continued, including mobilization/transfer assistance when needed, or any other ADLs of need.    Continue to follow nutritional/hydration status, no deficits reported at this time.    Pain appears clinically well-controlled, continue current treatment regimen.    Continue current antiepileptic treatment regimen.    Surveillance labs as per routine/need.    [x]  Discussed Patient in detail with nursing/staff, addressed all needs today.     [x]  Plan of Care Reviewed   []  PT/OT Reviewed   []  Order Changes  []  Discharge Plans Reviewed   []  Code Status Changes     I spent 35 minutes caring for Viviane on this date of service. This time includes time spent by me in the following activities:preparing for the visit, performing a medically appropriate examination and/or evaluation , counseling and educating the patient/family/caregiver, ordering medications, tests, or procedures, documenting information in the medical record, and care coordination    I confirm accuracy of unchanged data/findings which have been carried forward from previous visit, as well as I have updated appropriately those that have changed.        Luigi Urena DO        11/20/2024

## 2024-12-04 RX ORDER — CLONAZEPAM 0.5 MG/1
0.5 TABLET ORAL 2 TIMES DAILY
Qty: 60 TABLET | Refills: 5 | Status: SHIPPED | OUTPATIENT
Start: 2024-12-04

## 2025-01-22 ENCOUNTER — NURSING HOME (OUTPATIENT)
Age: 70
End: 2025-01-22
Payer: MEDICARE

## 2025-01-22 VITALS
OXYGEN SATURATION: 94 % | HEIGHT: 62 IN | TEMPERATURE: 98.6 F | HEART RATE: 61 BPM | SYSTOLIC BLOOD PRESSURE: 103 MMHG | BODY MASS INDEX: 36.1 KG/M2 | RESPIRATION RATE: 18 BRPM | DIASTOLIC BLOOD PRESSURE: 63 MMHG | WEIGHT: 196.2 LBS

## 2025-01-22 DIAGNOSIS — G40.309 NONINTRACTABLE GENERALIZED IDIOPATHIC EPILEPSY WITHOUT STATUS EPILEPTICUS: Chronic | ICD-10-CM

## 2025-01-22 DIAGNOSIS — M17.0 PRIMARY OSTEOARTHRITIS OF BOTH KNEES: ICD-10-CM

## 2025-01-22 DIAGNOSIS — Z78.9 IMPAIRED MOBILITY AND ADLS: Primary | Chronic | ICD-10-CM

## 2025-01-22 DIAGNOSIS — M25.561 ARTHRALGIA OF BOTH KNEES: ICD-10-CM

## 2025-01-22 DIAGNOSIS — Z74.09 IMPAIRED MOBILITY AND ADLS: Primary | Chronic | ICD-10-CM

## 2025-01-22 DIAGNOSIS — M25.562 ARTHRALGIA OF BOTH KNEES: ICD-10-CM

## 2025-01-22 PROCEDURE — 99309 SBSQ NF CARE MODERATE MDM 30: CPT | Performed by: FAMILY MEDICINE

## 2025-02-10 NOTE — PROGRESS NOTES
Nursing Home Progress Note        Luigi Urena DO [x]  LATONIA Tony []  [address] [place] [address]  Phone: [phone]  Fax: [phone] Marcell Christina MD []  Rm Tran DO []  [address] [place] [address]  Phone: [phone]  Fax: [phone]     PATIENT NAME: Viviane Peñaloza                                                                          YOB: 1955           DATE OF SERVICE: 1/22/2025  FACILITY: []  [facility]  [] [place]  [x]  [facility]  [] [facility]  []  Other ______________________________________________________________________     CHIEF COMPLAINT:   Chronic medical management and long-term care/seizure activity      HISTORY OF PRESENT ILLNESS:   [x]  Follow Up visit for coordination of long term care issues and chronic medical management of Diagnoses and all orders for this visit:    1. Impaired mobility and ADLs (Primary)    2. Nonintractable generalized idiopathic epilepsy without status epilepticus    3. Primary osteoarthritis of both knees    4. Arthralgia of both knees    Nursing/staff reports the patient has been receptive to supportive care.  Patient remains pleasant/interactive to questioning/examination.  No nutritional/hydration deficits reported.    No seizure-like activity since last visit.    Pain has been well-controlled.    Vital signs have been stable.       PAST MEDICAL & SURGICAL HISTORY:   Past Medical History:   Diagnosis Date    Anemia     Anxiety     Ascorbic acid deficiency     Depression     Epilepsy     Fracture of unspecified phalanx of left index finger, initial encounter for closed fracture 01/16/2019    Fracture, clavicle     left     Hyperlipidemia     Hypertension     Need for assistance with personal care     Obesity     Presence of artificial knee joint, left     Presence of right artificial knee joint     Repeated falls     Shoulder fracture, left     Vitamin D deficiency       Past Surgical History:    Procedure Laterality Date    OOPHORECTOMY           MEDICATIONS:  I have reviewed and reconciled the patients medication list in the patients chart at the skilled nursing facility today.      ALLERGIES:    Allergies   Allergen Reactions    Tramadol Other (See Comments)         SOCIAL HISTORY:    Social History     Socioeconomic History    Marital status: Single   Tobacco Use    Smoking status: Never    Smokeless tobacco: Never   Substance and Sexual Activity    Alcohol use: No    Drug use: No    Sexual activity: Defer       FAMILY HISTORY:    Family History   Problem Relation Age of Onset    Diabetes Mother     Heart disease Mother     Hypertension Mother     Hypertension Father     Diabetes Sister     Hypertension Sister        REVIEW OF SYSTEMS:    Review of Systems  Appetite: Fair []   Good [x]   Poor []   Weight Loss []  [x]  Weight Stable   Unavoidable Weight Loss []  Tolerating Tube Feeding []    Supplements Provided []   Constitutional: Negative for fever, chills, diaphoresis or fatigue and weight change.   HENT: No dysphagia; no changes to vision/hearing/smell/taste; no epistaxis  Eyes: Negative for redness and visual disturbance.   Respiratory: Negative for shortness of breath, chest pain, cough or chest tightness.   Cardiovascular: Negative for chest pain and palpitations.   Gastrointestinal: Negative for abdominal distention, abdominal pain and blood in stool.   Endocrine: Negative for cold intolerance and heat intolerance.   Genitourinary: Negative for difficulty urinating, dysuria and frequency.Negative for hematuria   Musculoskeletal: Chronic myalgias and arthralgias, worse to the left knee but improving.  Left foot as per above.  Integumentary: Postsurgical scarring noted to the left knee.  No drainage or streaking erythema.  Neurological: Negative for syncope, weakness and headaches.   Hematological: Negative for adenopathy. Does not bruise/bleed easily.   Immunological: Negative for reported  allergies or immunological disorders  Psychological: No acute behavioral changes    PHYSICAL EXAMINATION:   VITAL SIGNS:   Vitals:    01/22/25 0828   BP: 103/63   Pulse: 61   Resp: 18   Temp: 98.6 °F (37 °C)   SpO2: 94%       Physical Exam      General Appearance:  [x]  Alert   [x]  Oriented x person  [x]  No acute distress     []  Confused  []  Disoriented   []  Comatose   Head:  Atraumatic and normocephalic, without obvious abnormality.   Eyes:         PERRLA, conjunctivae and sclerae normal, no Icterus. No pallor. Extra-occular movements are within normal limits.   Ears:  Ears appear intact with no abnormalities noted.   Throat: No oral lesions, no thrush, oral mucosa moist.   Neck: Supple, trachea midline, no thyromegaly, no carotid bruit.   Back:   No kyphoscoliosis. No tenderness to palpation.   Lungs:   Chest shape is normal. Breath sounds heard bilaterally equally.  No wheezing.    Audible air exchange noted all lung fields.   Heart:  Normal S1 and S2, no murmur, no gallop, no rub. No JVD.   Abdomen:   Normal bowel sounds, no masses, no organomegaly. Soft, non-tender, non-distended, no guarding.  Mild truncal obesity.   Extremities: Moves all extremities; mild gravity dependent edema of bilateral lower extremities, without cyanosis or clubbing.    Poor core strength and stability.   Requires assistance with transfers at times.  Utilizes walker for assistance with ambulation.  Quadriceps mechanism intact.  Johnathan/Nguyen test negative.  Symmetric and appropriate dorsiflexion and plantar flexion of bilateral feet.  Pain on palpation of the lateral dorsal foot.   Pulses: Pulses palpable and equal bilaterally.   Skin: No bleeding or rash.  Generalized dry skin noted.  Age-related atrophy of skin.  No lesions or rashes.   Neurologic: [x] Normal speech []  Normal mental status    [x] Cranial nerves II through XII intact   [x]  No anosmia [x]  DTR 2+ [x]  Proprioception intact  [x]  No focal motor/sensory  deficits      Psych/Mood:                    [x]  No acute changes []  Depressed  Urinary:                            [x]  Continent  []  Incontinent []  Retention  []  F/C      []  UTI w/treatment in progress           ASSESSMENT     Diagnoses and all orders for this visit:    1. Impaired mobility and ADLs (Primary)    2. Nonintractable generalized idiopathic epilepsy without status epilepticus    3. Primary osteoarthritis of both knees    4. Arthralgia of both knees          PLAN  Supportive care to be continued, assist mobilization/transfers if needed.  Assist any ADLs of need.    Continue current antiepileptic treatment regimen.    Pain appears clinically well-controlled.    Vital signs demonstrate hemodynamic stability.    Surveillance labs, including therapeutic antiepileptic ranges, to be continued as needed/per routine.    [x]  Discussed Patient in detail with nursing/staff, addressed all needs today.     [x]  Plan of Care Reviewed   []  PT/OT Reviewed   []  Order Changes  []  Discharge Plans Reviewed   []  Code Status Changes     I spent 35 minutes caring for Viviane on this date of service. This time includes time spent by me in the following activities:preparing for the visit, performing a medically appropriate examination and/or evaluation , counseling and educating the patient/family/caregiver, ordering medications, tests, or procedures, documenting information in the medical record, and care coordination    I confirm accuracy of unchanged data/findings which have been carried forward from previous visit, as well as I have updated appropriately those that have changed.      Luigi Urena DO        1/22/2025

## 2025-03-03 RX ORDER — PREGABALIN 100 MG/1
100 CAPSULE ORAL 2 TIMES DAILY
Qty: 60 CAPSULE | Refills: 5 | Status: SHIPPED | OUTPATIENT
Start: 2025-03-03

## 2025-03-19 ENCOUNTER — NURSING HOME (OUTPATIENT)
Age: 70
End: 2025-03-19
Payer: MEDICARE

## 2025-03-19 VITALS
RESPIRATION RATE: 18 BRPM | HEIGHT: 62 IN | TEMPERATURE: 97.5 F | SYSTOLIC BLOOD PRESSURE: 124 MMHG | OXYGEN SATURATION: 97 % | WEIGHT: 198.8 LBS | DIASTOLIC BLOOD PRESSURE: 54 MMHG | BODY MASS INDEX: 36.58 KG/M2 | HEART RATE: 72 BPM

## 2025-03-19 DIAGNOSIS — M17.0 PRIMARY OSTEOARTHRITIS OF BOTH KNEES: ICD-10-CM

## 2025-03-19 DIAGNOSIS — M25.561 ARTHRALGIA OF BOTH KNEES: ICD-10-CM

## 2025-03-19 DIAGNOSIS — E55.9 VITAMIN D DEFICIENCY: ICD-10-CM

## 2025-03-19 DIAGNOSIS — Z78.9 IMPAIRED MOBILITY AND ADLS: Primary | Chronic | ICD-10-CM

## 2025-03-19 DIAGNOSIS — G40.309 NONINTRACTABLE GENERALIZED IDIOPATHIC EPILEPSY WITHOUT STATUS EPILEPTICUS: Chronic | ICD-10-CM

## 2025-03-19 DIAGNOSIS — M25.562 ARTHRALGIA OF BOTH KNEES: ICD-10-CM

## 2025-03-19 DIAGNOSIS — Z74.09 IMPAIRED MOBILITY AND ADLS: Primary | Chronic | ICD-10-CM

## 2025-03-19 NOTE — PROGRESS NOTES
Nursing Home Progress Note        Luigi Urena DO [x]  LATONIA Tony []  [address] [place] [address]  Phone: [phone]  Fax: [phone] Marcell Christina MD []  Rm Tran DO []  [address] [place] [address]  Phone: [phone]  Fax: [phone]     PATIENT NAME: Viviane Peñaloza                                                                          YOB: 1955           DATE OF SERVICE: 3/19/2025  FACILITY: []  [place]  [] [place]  [x]  [facility]  [] [facility]  []  Other ______________________________________________________________________     CHIEF COMPLAINT:   Chronic medical management and long-term care/seizure activity      HISTORY OF PRESENT ILLNESS:   [x]  Follow Up visit for coordination of long term care issues and chronic medical management of Diagnoses and all orders for this visit:    1. Impaired mobility and ADLs (Primary)    2. Nonintractable generalized idiopathic epilepsy without status epilepticus    3. Primary osteoarthritis of both knees    4. Arthralgia of both knees    5. Vitamin D deficiency    Remains pleasant/interactive during questioning and examination.  Nursing/staff reports no new falls or injuries.  Patient has been receptive to supportive care.  No nutritional/hydration deficits reported.    Patient reports pain in bilateral knees well-controlled.    Staff states patient has had no seizure-like activity since last visit.    Vital signs have been stable.       PAST MEDICAL & SURGICAL HISTORY:   Past Medical History:   Diagnosis Date    Anemia     Anxiety     Ascorbic acid deficiency     Depression     Epilepsy     Fracture of unspecified phalanx of left index finger, initial encounter for closed fracture 01/16/2019    Fracture, clavicle     left     Hyperlipidemia     Hypertension     Need for assistance with personal care     Obesity     Presence of artificial knee joint, left     Presence of right artificial knee joint      Repeated falls     Shoulder fracture, left     Vitamin D deficiency       Past Surgical History:   Procedure Laterality Date    OOPHORECTOMY           MEDICATIONS:  I have reviewed and reconciled the patients medication list in the patients chart at the skilled nursing facility today.      ALLERGIES:    Allergies   Allergen Reactions    Tramadol Other (See Comments)         SOCIAL HISTORY:    Social History     Socioeconomic History    Marital status: Single   Tobacco Use    Smoking status: Never    Smokeless tobacco: Never   Substance and Sexual Activity    Alcohol use: No    Drug use: No    Sexual activity: Defer       FAMILY HISTORY:    Family History   Problem Relation Age of Onset    Diabetes Mother     Heart disease Mother     Hypertension Mother     Hypertension Father     Diabetes Sister     Hypertension Sister        REVIEW OF SYSTEMS:    Review of Systems  Appetite: Fair []   Good [x]   Poor []   Weight Loss []  [x]  Weight Stable   Unavoidable Weight Loss []  Tolerating Tube Feeding []    Supplements Provided []   Constitutional: Negative for fever, chills, diaphoresis or fatigue and weight change.   HENT: No dysphagia; no changes to vision/hearing/smell/taste; no epistaxis  Eyes: Negative for redness and visual disturbance.   Respiratory: Negative for shortness of breath, chest pain, cough or chest tightness.   Cardiovascular: Negative for chest pain and palpitations.   Gastrointestinal: Negative for abdominal distention, abdominal pain and blood in stool.   Endocrine: Negative for cold intolerance and heat intolerance.   Genitourinary: Negative for difficulty urinating, dysuria and frequency.Negative for hematuria   Musculoskeletal: Chronic myalgias and arthralgias, worse to the left knee but improving.  Left foot as per above.  Integumentary: Postsurgical scarring noted to the left knee.  No drainage or streaking erythema.  Neurological: Negative for syncope, weakness and headaches.   Hematological:  Negative for adenopathy. Does not bruise/bleed easily.   Immunological: Negative for reported allergies or immunological disorders  Psychological: No acute behavioral changes    PHYSICAL EXAMINATION:   VITAL SIGNS:   Vitals:    03/19/25 0905   BP: 124/54   Pulse: 72   Resp: 18   Temp: 97.5 °F (36.4 °C)   SpO2: 97%       Physical Exam      General Appearance:  [x]  Alert   [x]  Oriented x person  [x]  No acute distress     []  Confused  []  Disoriented   []  Comatose   Head:  Atraumatic and normocephalic, without obvious abnormality.   Eyes:         PERRLA, conjunctivae and sclerae normal, no Icterus. No pallor. Extra-occular movements are within normal limits.   Ears:  Ears appear intact with no abnormalities noted.   Throat: No oral lesions, no thrush, oral mucosa moist.   Neck: Supple, trachea midline, no thyromegaly, no carotid bruit.   Back:   No kyphoscoliosis. No tenderness to palpation.   Lungs:   Chest shape is normal. Breath sounds heard bilaterally equally.  No wheezing.    Audible air exchange noted all lung fields.   Heart:  Normal S1 and S2, no murmur, no gallop, no rub. No JVD.   Abdomen:   Normal bowel sounds, no masses, no organomegaly. Soft, non-tender, non-distended, no guarding.  Mild truncal obesity.   Extremities: Moves all extremities; mild gravity dependent edema of bilateral lower extremities, without cyanosis or clubbing.    Poor core strength and stability.   Requires assistance with transfers at times.  Utilizes walker for assistance with ambulation.  Quadriceps mechanism intact.  Johnathan/Nguyen test negative.  Symmetric and appropriate dorsiflexion and plantar flexion of bilateral feet.  Pain on palpation of the lateral dorsal foot.   Pulses: Pulses palpable and equal bilaterally.   Skin: No bleeding or rash.  Generalized dry skin noted.  Age-related atrophy of skin.  No lesions or rashes.   Neurologic: [x] Normal speech []  Normal mental status    [x] Cranial nerves II through XII intact    [x]  No anosmia [x]  DTR 2+ [x]  Proprioception intact  [x]  No focal motor/sensory deficits      Psych/Mood:                    [x]  No acute changes []  Depressed  Urinary:                            [x]  Continent  []  Incontinent []  Retention  []  F/C      []  UTI w/treatment in progress           ASSESSMENT     Diagnoses and all orders for this visit:    1. Impaired mobility and ADLs (Primary)    2. Nonintractable generalized idiopathic epilepsy without status epilepticus    3. Primary osteoarthritis of both knees    4. Arthralgia of both knees    5. Vitamin D deficiency          PLAN  Supportive care to be continued, assist any ADLs of need or mobilization assistance.  No nutritional/hydration deficits reported, continue to follow clinically.    Pain has appeared clinically well-controlled.    Continue current antiepileptic treatment regimen.    Vital signs demonstrate hemodynamic stability.    Patient has inquired about the possibility of an occasional social alcoholic beverage while on outings with other members of the facility.  I have discussed with her the occasional appropriateness of this, as it is certainly her independent decision.  I have discussed with her the need to maintain moderation in alcohol intake.  Needed to follow clinically.    Surveillance labs as per routine/need.    [x]  Discussed Patient in detail with nursing/staff, addressed all needs today.     [x]  Plan of Care Reviewed   []  PT/OT Reviewed   []  Order Changes  []  Discharge Plans Reviewed   []  Code Status Changes     I spent 35 minutes caring for Viviane on this date of service. This time includes time spent by me in the following activities:preparing for the visit, performing a medically appropriate examination and/or evaluation , counseling and educating the patient/family/caregiver, ordering medications, tests, or procedures, documenting information in the medical record, and care coordination    I confirm accuracy of  unchanged data/findings which have been carried forward from previous visit, as well as I have updated appropriately those that have changed.        Luigi Urena DO        3/19/2025

## 2025-03-27 RX ORDER — PHENOBARBITAL 64.8 MG/1
64.8 TABLET ORAL 2 TIMES DAILY
Qty: 60 TABLET | Refills: 5 | Status: SHIPPED | OUTPATIENT
Start: 2025-03-27

## 2025-03-27 NOTE — TELEPHONE ENCOUNTER
[facility] REQUESTING MED REFILL FOR PHENOBARBITAL 64.8 MG.    DIRECTIONS: PHENOBARBITAL 64.8 MG, 1 TAB PO BID.

## 2025-04-10 RX ORDER — PHENOBARBITAL 64.8 MG/1
64.8 TABLET ORAL 2 TIMES DAILY
Qty: 60 TABLET | Refills: 5 | Status: SHIPPED | OUTPATIENT
Start: 2025-04-10

## 2025-05-28 ENCOUNTER — NURSING HOME (OUTPATIENT)
Age: 70
End: 2025-05-28
Payer: MEDICARE

## 2025-05-28 VITALS
RESPIRATION RATE: 18 BRPM | SYSTOLIC BLOOD PRESSURE: 120 MMHG | TEMPERATURE: 97.3 F | HEART RATE: 76 BPM | DIASTOLIC BLOOD PRESSURE: 60 MMHG | BODY MASS INDEX: 36.82 KG/M2 | OXYGEN SATURATION: 92 % | WEIGHT: 200.1 LBS | HEIGHT: 62 IN

## 2025-05-28 DIAGNOSIS — Z74.09 IMPAIRED MOBILITY AND ADLS: Primary | Chronic | ICD-10-CM

## 2025-05-28 DIAGNOSIS — M17.0 PRIMARY OSTEOARTHRITIS OF BOTH KNEES: ICD-10-CM

## 2025-05-28 DIAGNOSIS — Z78.9 IMPAIRED MOBILITY AND ADLS: Primary | Chronic | ICD-10-CM

## 2025-05-28 DIAGNOSIS — M25.562 ARTHRALGIA OF BOTH KNEES: ICD-10-CM

## 2025-05-28 DIAGNOSIS — M25.561 ARTHRALGIA OF BOTH KNEES: ICD-10-CM

## 2025-05-28 DIAGNOSIS — G40.309 NONINTRACTABLE GENERALIZED IDIOPATHIC EPILEPSY WITHOUT STATUS EPILEPTICUS: Chronic | ICD-10-CM

## 2025-06-02 RX ORDER — CLONAZEPAM 0.5 MG/1
0.5 TABLET ORAL 2 TIMES DAILY
Qty: 60 TABLET | Refills: 5 | Status: SHIPPED | OUTPATIENT
Start: 2025-06-02

## 2025-06-02 NOTE — TELEPHONE ENCOUNTER
[facility] REQUESTING MED REFILL FOR CLONAZEPAM 0.5 MG.    DIRECTIONS: CLONAZEPAM 0.5 MG 1 TAB PO BID.

## 2025-06-13 NOTE — PROGRESS NOTES
Nursing Home Progress Note        Luigi Urena DO [x]  LATONIA Tony []  [address] [place] [address]  Phone: [phone]  Fax: [phone] Marcell Christina MD []  Rm Tran DO []  [address] [place] [address]  Phone: [phone]  Fax: [phone]     PATIENT NAME: Viviane Peñaloza                                                                          YOB: 1955           DATE OF SERVICE: 5/28/2025  FACILITY: []  [place]  [] [place]  [x]  [facility]  [] [facility]  []  Other ______________________________________________________________________     CHIEF COMPLAINT:   Chronic medical management and long-term care/seizure activity      HISTORY OF PRESENT ILLNESS:   [x]  Follow Up visit for coordination of long term care issues and chronic medical management of Diagnoses and all orders for this visit:    1. Impaired mobility and ADLs (Primary)    2. Nonintractable generalized idiopathic epilepsy without status epilepticus    3. Primary osteoarthritis of both knees    4. Arthralgia of both knees    Nursing/staff reports patient has been receptive to supportive care.  Patient is interactive and pleasant during questioning/examination.    No seizure-like activity since last visit.    No new falls or injuries reported, pain has been well-controlled to bilateral knees.    Vital signs have been stable.       PAST MEDICAL & SURGICAL HISTORY:   Past Medical History:   Diagnosis Date    Anemia     Anxiety     Ascorbic acid deficiency     Depression     Epilepsy     Fracture of unspecified phalanx of left index finger, initial encounter for closed fracture 01/16/2019    Fracture, clavicle     left     Hyperlipidemia     Hypertension     Need for assistance with personal care     Obesity     Presence of artificial knee joint, left     Presence of right artificial knee joint     Repeated falls     Shoulder fracture, left     Vitamin D deficiency       Past Surgical History:    Procedure Laterality Date    OOPHORECTOMY           MEDICATIONS:  I have reviewed and reconciled the patients medication list in the patients chart at the skilled nursing facility today.      ALLERGIES:    Allergies   Allergen Reactions    Tramadol Other (See Comments)         SOCIAL HISTORY:    Social History     Socioeconomic History    Marital status: Single   Tobacco Use    Smoking status: Never    Smokeless tobacco: Never   Substance and Sexual Activity    Alcohol use: No    Drug use: No    Sexual activity: Defer       FAMILY HISTORY:    Family History   Problem Relation Age of Onset    Diabetes Mother     Heart disease Mother     Hypertension Mother     Hypertension Father     Diabetes Sister     Hypertension Sister        REVIEW OF SYSTEMS:    Review of Systems  Appetite: Fair []   Good [x]   Poor []   Weight Loss []  [x]  Weight Stable   Unavoidable Weight Loss []  Tolerating Tube Feeding []    Supplements Provided []   Constitutional: Negative for fever, chills, diaphoresis or fatigue and weight change.   HENT: No dysphagia; no changes to vision/hearing/smell/taste; no epistaxis  Eyes: Negative for redness and visual disturbance.   Respiratory: Negative for shortness of breath, chest pain, cough or chest tightness.   Cardiovascular: Negative for chest pain and palpitations.   Gastrointestinal: Negative for abdominal distention, abdominal pain and blood in stool.   Endocrine: Negative for cold intolerance and heat intolerance.   Genitourinary: Negative for difficulty urinating, dysuria and frequency.Negative for hematuria   Musculoskeletal: Chronic myalgias and arthralgias, worse to the left knee but improving.  Left foot as per above.  Integumentary: Postsurgical scarring noted to the left knee.  No drainage or streaking erythema.  Neurological: Negative for syncope, weakness and headaches.   Hematological: Negative for adenopathy. Does not bruise/bleed easily.   Immunological: Negative for reported  allergies or immunological disorders  Psychological: No acute behavioral changes    PHYSICAL EXAMINATION:   VITAL SIGNS:   Vitals:    05/28/25 0833   BP: 120/60   Pulse: 76   Resp: 18   Temp: 97.3 °F (36.3 °C)   SpO2: 92%       Physical Exam      General Appearance:  [x]  Alert   [x]  Oriented x person  [x]  No acute distress     []  Confused  []  Disoriented   []  Comatose   Head:  Atraumatic and normocephalic, without obvious abnormality.   Eyes:         PERRLA, conjunctivae and sclerae normal, no Icterus. No pallor. Extra-occular movements are within normal limits.   Ears:  Ears appear intact with no abnormalities noted.   Throat: No oral lesions, no thrush, oral mucosa moist.   Neck: Supple, trachea midline, no thyromegaly, no carotid bruit.   Back:   No kyphoscoliosis. No tenderness to palpation.   Lungs:   Chest shape is normal. Breath sounds heard bilaterally equally.  No wheezing.    Audible air exchange noted all lung fields.   Heart:  Normal S1 and S2, no murmur, no gallop, no rub. No JVD.   Abdomen:   Normal bowel sounds, no masses, no organomegaly. Soft, non-tender, non-distended, no guarding.  Mild truncal obesity.   Extremities: Moves all extremities; mild gravity dependent edema of bilateral lower extremities, without cyanosis or clubbing.    Poor core strength and stability.   Requires assistance with transfers at times.  Utilizes walker for assistance with ambulation.  Quadriceps mechanism intact.  Johnathan/Nguyen test negative.  Symmetric and appropriate dorsiflexion and plantar flexion of bilateral feet.  Pain on palpation of the lateral dorsal foot.   Pulses: Pulses palpable and equal bilaterally.   Skin: No bleeding or rash.  Generalized dry skin noted.  Age-related atrophy of skin.  No lesions or rashes.   Neurologic: [x] Normal speech []  Normal mental status    [x] Cranial nerves II through XII intact   [x]  No anosmia [x]  DTR 2+ [x]  Proprioception intact  [x]  No focal motor/sensory  deficits      Psych/Mood:                    [x]  No acute changes []  Depressed  Urinary:                            [x]  Continent  []  Incontinent []  Retention  []  F/C      []  UTI w/treatment in progress           ASSESSMENT     Diagnoses and all orders for this visit:    1. Impaired mobility and ADLs (Primary)    2. Nonintractable generalized idiopathic epilepsy without status epilepticus    3. Primary osteoarthritis of both knees    4. Arthralgia of both knees          PLAN  Continued use of supportive care for mobilization/transfers, fall precautions in place given impaired mobility.  Assist any ADLs of need.    Continue to follow nutritional/hydration status, no deficits reported at this time.    Pain appears clinically well-controlled.    Continue current antiepileptic treatment regimen.    Vital signs demonstrate hemodynamic stability.    Surveillance labs as per routine/need.    [x]  Discussed Patient in detail with nursing/staff, addressed all needs today.     [x]  Plan of Care Reviewed   []  PT/OT Reviewed   []  Order Changes  []  Discharge Plans Reviewed   []  Code Status Changes     I spent 35 minutes caring for Viviane on this date of service. This time includes time spent by me in the following activities:preparing for the visit, performing a medically appropriate examination and/or evaluation , counseling and educating the patient/family/caregiver, ordering medications, tests, or procedures, documenting information in the medical record, and care coordination    I confirm accuracy of unchanged data/findings which have been carried forward from previous visit, as well as I have updated appropriately those that have changed.      Luigi Urena DO        5/28/2025

## 2025-07-16 ENCOUNTER — NURSING HOME (OUTPATIENT)
Age: 70
End: 2025-07-16
Payer: MEDICARE

## 2025-07-16 VITALS
HEART RATE: 85 BPM | BODY MASS INDEX: 37.21 KG/M2 | TEMPERATURE: 98.3 F | WEIGHT: 202.2 LBS | HEIGHT: 62 IN | SYSTOLIC BLOOD PRESSURE: 120 MMHG | OXYGEN SATURATION: 97 % | RESPIRATION RATE: 18 BRPM | DIASTOLIC BLOOD PRESSURE: 68 MMHG

## 2025-07-16 DIAGNOSIS — M25.561 ARTHRALGIA OF BOTH KNEES: ICD-10-CM

## 2025-07-16 DIAGNOSIS — Z74.09 IMPAIRED MOBILITY AND ADLS: Primary | Chronic | ICD-10-CM

## 2025-07-16 DIAGNOSIS — G40.309 NONINTRACTABLE GENERALIZED IDIOPATHIC EPILEPSY WITHOUT STATUS EPILEPTICUS: Chronic | ICD-10-CM

## 2025-07-16 DIAGNOSIS — Z78.9 IMPAIRED MOBILITY AND ADLS: Primary | Chronic | ICD-10-CM

## 2025-07-16 DIAGNOSIS — M25.562 ARTHRALGIA OF BOTH KNEES: ICD-10-CM

## 2025-07-16 DIAGNOSIS — M17.0 PRIMARY OSTEOARTHRITIS OF BOTH KNEES: ICD-10-CM

## 2025-07-30 NOTE — PROGRESS NOTES
Nursing Home Progress Note        Luigi Urena DO [x]  LATONIA Tony []  [address] [place] [address]  Phone: [phone]  Fax: [phone] Marcell Christina MD []  Rm Tran DO []  [address] [place] [address]  Phone: [phone]  Fax: [phone]     PATIENT NAME: Viviane Peñaloza                                                                          YOB: 1955           DATE OF SERVICE: 7/16/2025  FACILITY: []  [facility]  [] [place]  [x]  [facility]  [] [facility]  []  Other ______________________________________________________________________     CHIEF COMPLAINT:   Chronic medical management and long-term care/seizure activity      HISTORY OF PRESENT ILLNESS:   [x]  Follow Up visit for coordination of long term care issues and chronic medical management of Diagnoses and all orders for this visit:    1. Impaired mobility and ADLs (Primary)    2. Arthralgia of both knees    3. Nonintractable generalized idiopathic epilepsy without status epilepticus    4. Primary osteoarthritis of both knees    Patient remains pleasant and interactive during questioning/examination.  She denies new falls or injuries.  Nursing/staff reports that she has been receptive to supportive care.    No reports of any nutritional/hydration deficits.    Vital signs been stable.    Pain has been well-controlled.    No reports of any seizure-like activity since last visit.       PAST MEDICAL & SURGICAL HISTORY:   Past Medical History:   Diagnosis Date    Anemia     Anxiety     Ascorbic acid deficiency     Depression     Epilepsy     Fracture of unspecified phalanx of left index finger, initial encounter for closed fracture 01/16/2019    Fracture, clavicle     left     Hyperlipidemia     Hypertension     Need for assistance with personal care     Obesity     Presence of artificial knee joint, left     Presence of right artificial knee joint     Repeated falls     Shoulder fracture, left      Vitamin D deficiency       Past Surgical History:   Procedure Laterality Date    OOPHORECTOMY           MEDICATIONS:  I have reviewed and reconciled the patients medication list in the patients chart at the skilled nursing facility today.      ALLERGIES:    Allergies   Allergen Reactions    Tramadol Other (See Comments)         SOCIAL HISTORY:    Social History     Socioeconomic History    Marital status: Single   Tobacco Use    Smoking status: Never    Smokeless tobacco: Never   Substance and Sexual Activity    Alcohol use: No    Drug use: No    Sexual activity: Defer       FAMILY HISTORY:    Family History   Problem Relation Age of Onset    Diabetes Mother     Heart disease Mother     Hypertension Mother     Hypertension Father     Diabetes Sister     Hypertension Sister        REVIEW OF SYSTEMS:    Review of Systems  Appetite: Fair []   Good [x]   Poor []   Weight Loss []  [x]  Weight Stable   Unavoidable Weight Loss []  Tolerating Tube Feeding []    Supplements Provided []   Constitutional: Negative for fever, chills, diaphoresis or fatigue and weight change.   HENT: No dysphagia; no changes to vision/hearing/smell/taste; no epistaxis  Eyes: Negative for redness and visual disturbance.   Respiratory: Negative for shortness of breath, chest pain, cough or chest tightness.   Cardiovascular: Negative for chest pain and palpitations.   Gastrointestinal: Negative for abdominal distention, abdominal pain and blood in stool.   Endocrine: Negative for cold intolerance and heat intolerance.   Genitourinary: Negative for difficulty urinating, dysuria and frequency.Negative for hematuria   Musculoskeletal: Chronic myalgias and arthralgias, worse to the left knee but improving.  Left foot as per above.  Integumentary: Postsurgical scarring noted to the left knee.  No drainage or streaking erythema.  Neurological: Negative for syncope, weakness and headaches.   Hematological: Negative for adenopathy. Does not bruise/bleed  easily.   Immunological: Negative for reported allergies or immunological disorders  Psychological: No acute behavioral changes    PHYSICAL EXAMINATION:   VITAL SIGNS:   Vitals:    07/16/25 0837   BP: 120/68   Pulse: 85   Resp: 18   Temp: 98.3 °F (36.8 °C)   SpO2: 97%       Physical Exam      General Appearance:  [x]  Alert   [x]  Oriented x person  [x]  No acute distress     []  Confused  []  Disoriented   []  Comatose   Head:  Atraumatic and normocephalic, without obvious abnormality.   Eyes:         PERRLA, conjunctivae and sclerae normal, no Icterus. No pallor. Extra-occular movements are within normal limits.   Ears:  Ears appear intact with no abnormalities noted.   Throat: No oral lesions, no thrush, oral mucosa moist.   Neck: Supple, trachea midline, no thyromegaly, no carotid bruit.   Back:   No kyphoscoliosis. No tenderness to palpation.   Lungs:   Chest shape is normal. Breath sounds heard bilaterally equally.  No wheezing.    Audible air exchange noted all lung fields.   Heart:  Normal S1 and S2, no murmur, no gallop, no rub. No JVD.   Abdomen:   Normal bowel sounds, no masses, no organomegaly. Soft, non-tender, non-distended, no guarding.  Mild truncal obesity.   Extremities: Moves all extremities; mild gravity dependent edema of bilateral lower extremities, without cyanosis or clubbing.    Poor core strength and stability.   Requires assistance with transfers at times.  Utilizes walker for assistance with ambulation.  Quadriceps mechanism intact.  Johnathan/Nguyen test negative.  Symmetric and appropriate dorsiflexion and plantar flexion of bilateral feet.  Pain on palpation of the lateral dorsal foot.   Pulses: Pulses palpable and equal bilaterally.   Skin: No bleeding or rash.  Generalized dry skin noted.  Age-related atrophy of skin.  No lesions or rashes.   Neurologic: [x] Normal speech []  Normal mental status    [x] Cranial nerves II through XII intact   [x]  No anosmia [x]  DTR 2+ [x]   Proprioception intact  [x]  No focal motor/sensory deficits      Psych/Mood:                    [x]  No acute changes []  Depressed  Urinary:                            [x]  Continent  []  Incontinent []  Retention  []  F/C      []  UTI w/treatment in progress           ASSESSMENT     Diagnoses and all orders for this visit:    1. Impaired mobility and ADLs (Primary)    2. Arthralgia of both knees    3. Nonintractable generalized idiopathic epilepsy without status epilepticus    4. Primary osteoarthritis of both knees          PLAN  Continue supportive care for any needed mobilization/transfer assistance, as well as any ADLs if needed.  Continue to follow nutritional/hydration status, no deficits reported at this time.    Continue current antiepileptic treatment regimen, no seizure-like activity since last visit.    Pain appears clinically well-controlled, known advanced osteoarthritic changes bilateral knees, status post left total knee arthroplasty.    Surveillance labs as per routine/need.    Vital signs demonstrate hemodynamic stability.    [x]  Discussed Patient in detail with nursing/staff, addressed all needs today.     [x]  Plan of Care Reviewed   []  PT/OT Reviewed   []  Order Changes  []  Discharge Plans Reviewed   []  Code Status Changes     I spent 35 minutes caring for Viviane on this date of service. This time includes time spent by me in the following activities:preparing for the visit, performing a medically appropriate examination and/or evaluation , counseling and educating the patient/family/caregiver, ordering medications, tests, or procedures, documenting information in the medical record, and care coordination    I confirm accuracy of unchanged data/findings which have been carried forward from previous visit, as well as I have updated appropriately those that have changed.    Luigi Urena DO        7/16/2025

## 2025-08-27 ENCOUNTER — NURSING HOME (OUTPATIENT)
Age: 70
End: 2025-08-27
Payer: MEDICARE

## 2025-08-27 VITALS
TEMPERATURE: 97.4 F | BODY MASS INDEX: 37.35 KG/M2 | DIASTOLIC BLOOD PRESSURE: 61 MMHG | OXYGEN SATURATION: 97 % | WEIGHT: 204.2 LBS | HEART RATE: 78 BPM | SYSTOLIC BLOOD PRESSURE: 158 MMHG | RESPIRATION RATE: 18 BRPM

## 2025-08-27 DIAGNOSIS — R53.81 OTHER MALAISE: ICD-10-CM

## 2025-08-27 DIAGNOSIS — R51.9 ACUTE INTRACTABLE HEADACHE, UNSPECIFIED HEADACHE TYPE: Primary | ICD-10-CM

## 2025-08-27 DIAGNOSIS — G40.309 NONINTRACTABLE GENERALIZED IDIOPATHIC EPILEPSY WITHOUT STATUS EPILEPTICUS: Chronic | ICD-10-CM

## 2025-08-28 PROBLEM — R51.9 ACUTE INTRACTABLE HEADACHE: Status: ACTIVE | Noted: 2025-08-28

## 2025-08-28 RX ORDER — PREGABALIN 100 MG/1
100 CAPSULE ORAL 2 TIMES DAILY
Qty: 60 CAPSULE | Refills: 5 | Status: SHIPPED | OUTPATIENT
Start: 2025-08-28